# Patient Record
Sex: FEMALE | Race: WHITE | NOT HISPANIC OR LATINO | Employment: FULL TIME | URBAN - METROPOLITAN AREA
[De-identification: names, ages, dates, MRNs, and addresses within clinical notes are randomized per-mention and may not be internally consistent; named-entity substitution may affect disease eponyms.]

---

## 2017-11-16 ENCOUNTER — TELEPHONE (OUTPATIENT)
Dept: ONCOLOGY | Facility: CLINIC | Age: 32
End: 2017-11-16

## 2017-11-16 NOTE — TELEPHONE ENCOUNTER
Patient called wanting to know about getting back on oral liquid iron. She previously has been on it but the medication was discontinued and would like to get back on a different oral liquid form of iron.    I spoke with pharmacy and they confirmed several different liquid options that are OTC but usually have to be ordered and it takes a day.    I called the patient and informed  Her of the changes. Laura Zavala

## 2017-11-20 ENCOUNTER — TELEPHONE (OUTPATIENT)
Dept: ONCOLOGY | Facility: CLINIC | Age: 32
End: 2017-11-20

## 2017-12-07 ENCOUNTER — HOSPITAL ENCOUNTER (OUTPATIENT)
Facility: CLINIC | Age: 32
Setting detail: SPECIMEN
Discharge: HOME | End: 2017-12-07
Attending: INTERNAL MEDICINE | Admitting: INTERNAL MEDICINE
Payer: COMMERCIAL

## 2017-12-07 ENCOUNTER — HOSPITAL ENCOUNTER (OUTPATIENT)
Facility: CLINIC | Age: 32
Setting detail: SPECIMEN
End: 2017-12-07
Attending: INTERNAL MEDICINE
Payer: COMMERCIAL

## 2017-12-07 ENCOUNTER — ONCOLOGY VISIT (OUTPATIENT)
Dept: ONCOLOGY | Facility: CLINIC | Age: 32
End: 2017-12-07
Attending: INTERNAL MEDICINE
Payer: COMMERCIAL

## 2017-12-07 VITALS
DIASTOLIC BLOOD PRESSURE: 78 MMHG | RESPIRATION RATE: 16 BRPM | HEART RATE: 89 BPM | WEIGHT: 150 LBS | TEMPERATURE: 98.3 F | SYSTOLIC BLOOD PRESSURE: 123 MMHG | OXYGEN SATURATION: 100 % | BODY MASS INDEX: 23.15 KG/M2

## 2017-12-07 DIAGNOSIS — D64.9 ANEMIA, UNSPECIFIED TYPE: Primary | ICD-10-CM

## 2017-12-07 DIAGNOSIS — R23.3 EASY BRUISING: ICD-10-CM

## 2017-12-07 DIAGNOSIS — R53.83 OTHER FATIGUE: ICD-10-CM

## 2017-12-07 DIAGNOSIS — N92.4 EXCESSIVE BLEEDING IN PREMENOPAUSAL PERIOD: ICD-10-CM

## 2017-12-07 DIAGNOSIS — M25.552 HIP PAIN, LEFT: ICD-10-CM

## 2017-12-07 LAB
ERYTHROCYTE [DISTWIDTH] IN BLOOD BY AUTOMATED COUNT: 16.8 % (ref 10–15)
FERRITIN SERPL-MCNC: 4 NG/ML (ref 12–150)
HCT VFR BLD AUTO: 34 % (ref 35–47)
HGB BLD-MCNC: 11 G/DL (ref 11.7–15.7)
IRON SATN MFR SERPL: 7 % (ref 15–46)
IRON SERPL-MCNC: 26 UG/DL (ref 35–180)
LDH SERPL L TO P-CCNC: 130 U/L (ref 81–234)
MCH RBC QN AUTO: 25.8 PG (ref 26.5–33)
MCHC RBC AUTO-ENTMCNC: 32.4 G/DL (ref 31.5–36.5)
MCV RBC AUTO: 80 FL (ref 78–100)
PLATELET # BLD AUTO: 277 10E9/L (ref 150–450)
RBC # BLD AUTO: 4.27 10E12/L (ref 3.8–5.2)
RETICS # AUTO: 31.6 10E9/L (ref 25–95)
RETICS/RBC NFR AUTO: 0.7 % (ref 0.5–2)
TIBC SERPL-MCNC: 389 UG/DL (ref 240–430)
TSH SERPL DL<=0.005 MIU/L-ACNC: 1.4 MU/L (ref 0.4–4)
WBC # BLD AUTO: 6.3 10E9/L (ref 4–11)

## 2017-12-07 PROCEDURE — 99211 OFF/OP EST MAY X REQ PHY/QHP: CPT

## 2017-12-07 PROCEDURE — 85245 CLOT FACTOR VIII VW RISTOCTN: CPT | Performed by: INTERNAL MEDICINE

## 2017-12-07 PROCEDURE — 83615 LACTATE (LD) (LDH) ENZYME: CPT | Performed by: INTERNAL MEDICINE

## 2017-12-07 PROCEDURE — 85246 CLOT FACTOR VIII VW ANTIGEN: CPT | Performed by: INTERNAL MEDICINE

## 2017-12-07 PROCEDURE — 85247 CLOT FACTOR VIII MULTIMETRIC: CPT | Performed by: INTERNAL MEDICINE

## 2017-12-07 PROCEDURE — 82728 ASSAY OF FERRITIN: CPT | Performed by: INTERNAL MEDICINE

## 2017-12-07 PROCEDURE — 99215 OFFICE O/P EST HI 40 MIN: CPT | Performed by: INTERNAL MEDICINE

## 2017-12-07 PROCEDURE — 00000401 ZZHCL STATISTIC THROMBIN TIME NC: Performed by: INTERNAL MEDICINE

## 2017-12-07 PROCEDURE — 00000167 ZZHCL STATISTIC INR NC: Performed by: INTERNAL MEDICINE

## 2017-12-07 PROCEDURE — 84443 ASSAY THYROID STIM HORMONE: CPT | Performed by: INTERNAL MEDICINE

## 2017-12-07 PROCEDURE — 36415 COLL VENOUS BLD VENIPUNCTURE: CPT

## 2017-12-07 PROCEDURE — 85045 AUTOMATED RETICULOCYTE COUNT: CPT | Performed by: INTERNAL MEDICINE

## 2017-12-07 PROCEDURE — 85240 CLOT FACTOR VIII AHG 1 STAGE: CPT | Performed by: INTERNAL MEDICINE

## 2017-12-07 PROCEDURE — 83550 IRON BINDING TEST: CPT | Performed by: INTERNAL MEDICINE

## 2017-12-07 PROCEDURE — 00000328 ZZHCL STATISTIC PTT NC: Performed by: INTERNAL MEDICINE

## 2017-12-07 PROCEDURE — 85027 COMPLETE CBC AUTOMATED: CPT | Performed by: INTERNAL MEDICINE

## 2017-12-07 PROCEDURE — 00000447 ZZHCL STATISTIC VON WILLEBRAND MULTIMERS: Performed by: INTERNAL MEDICINE

## 2017-12-07 PROCEDURE — 83540 ASSAY OF IRON: CPT | Performed by: INTERNAL MEDICINE

## 2017-12-07 RX ORDER — OMEGA-3-ACID ETHYL ESTERS 1 G/1
2 CAPSULE, LIQUID FILLED ORAL 2 TIMES DAILY
Status: ON HOLD | COMMUNITY
End: 2022-06-01

## 2017-12-07 ASSESSMENT — PAIN SCALES - GENERAL: PAINLEVEL: SEVERE PAIN (6)

## 2017-12-07 NOTE — LETTER
"    12/7/2017         RE: Judy Argueta  [address]  [facility] [id number]        Dear Colleague,    Thank you for referring your patient, Judy Argueta, to the [facility]. Please see a copy of my visit note below.    Oncology Rooming Note    December 7, 2017 2:49 PM   Judy Argueta is a 32 year old female who presents for:    Chief Complaint   Patient presents with     Oncology Clinic Visit     Enlarged lymph nodes     Initial Vitals: /78 (BP Location: Left arm, Patient Position: Sitting, Cuff Size: Adult Regular)  Pulse 89  Temp 98.3  F (36.8  C) (Oral)  Resp 16  Wt 68 kg (150 lb)  SpO2 100%  BMI 23.15 kg/m2 Estimated body mass index is 23.15 kg/(m^2) as calculated from the following:    Height as of 8/4/16: 1.715 m (5' 7.5\").    Weight as of this encounter: 68 kg (150 lb). Body surface area is 1.8 meters squared.  Severe Pain (6) Comment: left hip pain   No LMP recorded.  Allergies reviewed: Yes  Medications reviewed: Yes    Medications: Medication refills not needed today.  Pharmacy name entered into Pick a Student:    [facility] [place], [place] - [address]  [facility] [address] [place] [address]    Clinical concerns: None                 4 minutes for nursing intake (face to face time)     Ava Palmer MA    Medical Assistant Note:  Judy Argueta presents today for lab visit.    Patient seen by provider today: Yes: Dr. Joshi.   present during visit today: Not Applicable.    Concerns: No Concerns.    Procedure:  Lab draw site: RAC, Needle type: BF, Gauge: 21 g gauze and coban applied.    Post Assessment:  Labs drawn without difficulty: Yes.    Discharge Plan:  Departure Mode: Ambulatory.    Face to Face Time: 5.    Ava Palmer MA              HEMATOLOGY/ONCOLOGY HISTORY:  Judy Argueta is a female with lymphadenopathy.     1.   Patient has a history of lymphadenopathy for many years.  At age 16, she had a lump in " the right neck which was biopsied and was benign.  At the age of 18, she had a lump in the right axilla, which was biopsied and was found to be benign.  At the age of 19, she again had a lump in the right neck which was biopsied and was benign.  None of those records are available.   2.   Patient had a CT of the neck done at [facility] in [place], [place] on 11/28/2013.  It revealed cervical lymphadenopathy which had mildly progressed from 2005 baseline.  It revealed mildly prominent cervical lymph nodes  bilaterally.  One at the right level 3 measured 1.6 x 0.6 x 4.1 cm.  The left level 3 lymph node measures 0.9 x 0.6 cm.   3.  CT of the neck, chest, abdomen and pelvis was done on 07/02/2014.  It revealed right sided level 3-4 lymph node measuring 1.5 x 0.8 x 4.2 cm.   Left sided level 2 lymph node measured up to 2 cm.  There was no lymphadenopathy or mass in the chest, abdomen and pelvis.   4.  CT of the neck was done on last 10/06/2014.   Lymph nodes had decreased slightly in size.      SUBJECTIVE:    Ms. Judy Argueta is a 32-year-old female who was previously seen in the clinic for neck lymphadenopathy.  Last CT neck on 10/06/2014 revealed decrease in size of mildly prominent lymph nodes in both sides of the neck.       Patient presents to the clinic because she of few concerns.  For last few months, she has been feeling very weak and tired.  In the last 1 year, her appetite has been decreased and she has lost 18 pounds of weight.  She also has noticed increased bruising.  It is mainly on the lateral side of both thighs.  Patient also has been having more heavy menstrual bleeding.  She has bleeding for about 7-10 days.  They are heavy but regular.      She denies any headache.  She gets lightheaded.  No ear pain or sore throat.  No neck pain.  No chest pain or shortness of breath.  No abdominal pain, nausea or vomiting.  No urinary complaints.  No diarrhea.      Patient was recently seen in [facility] because of these symptoms.  She had multiple labs done on 10/31/2017.  CBC reveals low hemoglobin of 10.7 with low MCV of 79.  Normal WBC and platelets.  Sodium, potassium, calcium, creatinine, AST, ALT, and protein are all normal.  INR, PT and PTT are all normal.      Patient also has been having pain in the left hip for last few weeks.  It feels like that it is in the joint.  Pain goes down her thigh.  No pain in the right hip.  She denies any trauma or fall.  Pain gets worse whenever she has menstrual bleeding.      Patient has been taking oral iron.  Initially she was taking liquid oral iron which she was tolerating well.  She is not able to find them.  She has tried oral iron pills.  With that she gets abdominal discomfort and constipation.  Because of these symptoms, she has stopped them and does not want to take oral iron pills.      For menorrhagia, she has seen a gynecologist.  They recommended oral contraceptive pill.  She does not want to take them.      PHYSICAL EXAMINATION:   GENERAL:  She is alert and oriented x 3.   VITAL SIGNS:  Reviewed.   EYES:  No icterus.   THROAT:  No ulcer or thrush.   NECK:  Supple. No lymphadenopathy or thyromegaly.   AXILLAE:  No lymphadenopathy.   LUNGS:  Good air entry bilaterally.  No crackles or wheezing.   HEART:  Regular.  No murmur.   ABDOMEN:  Soft and nontender.  No mass.   EXTREMITIES:  No edema.  No calf swelling or tenderness.   SKIN:  No petechiae.  No rash.  Patient had pictures of her bruising on the phone.  There is ecchymosis on the thighs.      ASSESSMENT:   1.  A 32-year-old female with microcytic anemia.   2.  Menorrhagia.   3.  Fatigue.   4.  Left hip pain with radiation to left thigh.   5.  Easy bruising.   6.  Unintentional weight loss.      PLAN:   1.  I discussed with her regarding her symptoms.  She has fatigue.  I told her fatigue could be due to multiple factors including her anemia. We will also rule out hypothyroidism.  TSH will  be done.   2.  Discussed regarding anemia.  She has microcytic anemia.  Most likely this is due to her menstrual blood loss.  For workup for anemia, we will get labs including CBC, iron, ferritin and LDH.   3.  Discussed regarding left hip pain.  I am not sure what is causing her pain.  For further evaluation, we will get an MRI of the left hip and lumbar spine.   4.  Patient has fatigue and menorrhagia.  We will rule out von Willebrand's disease. von Willebrand panel will be ordered.   5. Patient was taking oral iron for microcytic anemia.  She could not tolerate it.  I told the patient that we will wait for labs from today.  We will plan on treating her with IV iron if she still has significant iron deficiency.   6.  She had multiple questions, which were all answered.  I will see her after the above investigations.      Total face-to-face time spent 40 minutes, more than 50% of the time spent in counseling and coordination of care.         PAULA BLUNT MD             D: 2017 15:42   T: 2017 05:07   MT: bibi      Name:     JUDY ARGUETA   MRN:      [id number]        Account:      [id number]   :      1985           Visit Date:   2017      Document: [id number]        HEMATOLOGY/ONCOLOGY HISTORY:  Judy Argueta is a female with lymphadenopathy.     1.   Patient has a history of lymphadenopathy for many years.  At age 16, she had a lump in the right neck which was biopsied and was benign.  At the age of 18, she had a lump in the right axilla, which was biopsied and was found to be benign.  At the age of 19, she had a lump in the right neck which was biopsied and was benign.  None of those records are available.   2.   Patient had a CT of the neck done at [facility] in [place], [place] on 2013.  It revealed cervical lymphadenopathy which had mildly progressed from 2005 baseline.  It revealed mildly prominent cervical lymph nodes  bilaterally.  One  lymph node at right level 3 measured  1.6 x 0.6 x 4.1 cm. Left level 3 lymph node measures 0.9 x 0.6 cm.   3.  CT neck, chest, abdomen and pelvis on 07/02/2014 revealed right sided level 3-4 lymph node measuring 1.5 x 0.8 x 4.2 cm.  The left sided level 2 lymph node measured up to 2 cm.  There was no lymphadenopathy or mass in the chest, abdomen and pelvis.   4.  CT neck on 10/06/2014 revealed decrease in size of lymph nodes.      SUBJECTIVE:  Ms. Judy Argueta is a 32-year-old female who was previously seen in the clinic for neck lymphadenopathy.  Last CT neck on 10/06/2014 revealed decrease in size of mildly prominent lymph nodes in both sides of the neck.  The patient says that she has infiltrating enlarged lymph node.      The patient presents to the clinic because she has a few concerns.  For the last few months she has been very weak and tired.  In the last 1 year her appetite has been decreased and she has lost 18 pounds of weight.  She also has noticed increased bruising.  It is mainly on the lateral side of both thighs.  The patient also has been having more heavy menstrual bleeding.  She has bleeding for about 7-10 days.  They are heavy, they are regular.      She denies any headache.  She does get lightheaded.  No ear pain, sore throat.  No neck pain.  No chest pain, shortness of breath.  No abdominal pain, nausea or vomiting.  No urinary complaints.  No diarrhea.      The patient was recently seen in [facility] because of these symptoms.  She had multiple labs done on 10/31/2017.  CBC reveals low hemoglobin of 10.7 with low MCV of 79.  Normal WBC and platelets.  Sodium, potassium, calcium, creatinine, AST, ALT, and protein are all normal.  INR, PT and PTT are all normal.      The patient also has been having pain in the left hip for the last few weeks.  The patient said that it is in the hip.  It feels like it is in the joint.  The pain goes down her thigh.  No pain in the right hip.  She denies any trauma or fall.  The  pain gets worse whenever she has menstrual bleeding.      The patient has been taking oral iron.  Initially she was taking liquid oral iron which she was tolerating well.  She is not able to find them.  She has tried oral iron pills.  With that she gets abdominal discomfort.  Also, patient says that she gets constipation.  Because of these symptoms, she has stopped them and does not want to take oral iron pills.      For menorrhagia, she has seen a gynecologist.  They recommended oral contraceptive pill.  She does not want to take them.      PHYSICAL EXAMINATION:   GENERAL:  She is alert and oriented x3.   VITAL SIGNS:  Reviewed.   EYES:  No icterus.   THROAT:  No ulcer or thrush.   NECK:  Supple, no lymphadenopathy or thyromegaly.   AXILLAE:  No lymphadenopathy.   LUNGS:  Good air entry bilaterally.  No crackles or wheezing.   HEART:  Regular.  No murmur.   ABDOMEN:  Soft and nontender.  No mass.   EXTREMITIES:  No edema.  No calf swelling or tenderness.   SKIN:  No petechiae.  No rash.  The patient had pictures of her bruising on the phone.  She showed it to me.  There is ecchymosis on the thighs.      ASSESSMENT:   1.  A 32-year-old female with microcytic anemia.   2.  Menorrhagia.   3.  Fatigue.   4.  Left hip pain with radiation to left thigh.   5.  Easy bruising.   6.  Unintentional weight loss.      PLAN:   1.  I discussed with her regarding her symptoms.  She has fatigue.  I told her fatigue could be due to multiple factors including her anemia.   2.  Discussed regarding anemia.  She has microcytic anemia.  Most likely this is due to her menstrual blood loss.  For workup for anemia, we will get labs including CBC, iron, ferritin and LDH.   3.  Discussed regarding fatigue.  I tole her fatigue could be due to multiple factors including anemia.  We will rule out hypothyroidism.  TSH will be done.   4.  Discussed regarding left hip pain.  I am not sure what is causing her pain.  For further evaluation, we will  get an MRI of the left hip and lumbar spine.   5.  The patient has fatigue and menorrhagia.  We will rule out von Willebrand's disease.  A von Willebrand panel will be ordered.   6.  The patient was taking oral iron for microcytic anemia.  She could not tolerate it.  I told the patient that we will wait for labs from today.  We will plan on treating her with IV iron if she still has significant iron deficiency.   7.  She had multiple questions, which were all answered.  I will see her after the above investigations.      Total face-to-face time spent 40 minutes, more than 50% of the time spent in counseling and coordination of care.         PAULA BLUNT MD             D: 2017 15:42   T: 2017 05:07   MT: bibi      Name:     DAYTON MULLEN   MRN:      [id number]        Account:      [id number]   :      1985           Visit Date:   2017      Document: [id number]          Again, thank you for allowing me to participate in the care of your patient.        Sincerely,        Paula Blunt MD

## 2017-12-07 NOTE — MR AVS SNAPSHOT
After Visit Summary   12/7/2017    Judy Argueta    MRN: [id number]           Patient Information     Date Of Birth          1985        Visit Information        Provider Department      12/7/2017 3:00 PM Layo Joshi MD [facility]        Today's Diagnoses     Anemia, unspecified type    -  1    Other fatigue        Easy bruising        Excessive bleeding in premenopausal period        Hip pain, left          Care Instructions    Lab.- Done LW  MRI left hip and lumbar spine.  Pelvic ultrasound.  Follow up after above.          Follow-ups after your visit        Your next 10 appointments already scheduled     Dec 13, 2017  8:30 AM CST   US PELVIC COMPLETE W TRANSVAGINAL with SHUS5   [facility] ([facility])    [address] [address] [phone]           Please bring a list of your medicines (including vitamins, minerals and over-the-counter drugs). Also, tell your doctor about any allergies you may have. Wear comfortable clothes and leave your valuables at home.  Adults: Drink six 8-ounce glasses of fluid one hour before your exam. Do NOT empty your bladder.  If you need to empty your bladder before your exam, try to release only a little bit of urine. Then, drink another 8oz glass of fluid.  Children: Children who are potty trained should drink at least 4 cups (32 oz) of liquid 45 minutes to one hour prior to the exam. The child s bladder must be full in order to achieve a diagnostic exam. If your child is very uncomfortable or has an urgent need to pee, please notify a technologist; they will try to find out how much longer the wait may be and provide instructions to help relieve the pressure. Occasionally it is medically necessary to insert a urinary catheter to fill the bladder.  Please call the Imaging Department at your exam site with any questions.            Dec 13, 2017 10:00 AM CST   (Arrive by 9:45 AM)     LUMBAR SPINE W/O & W CONTRAST with SHMRP1   [facility] ([facility])    [address] [address] [phone]           Take your medicines as usual, unless your doctor tells you not to. Bring a list of your current medicines to your exam (including vitamins, minerals and over-the-counter drugs).  You will be given intravenous contrast for this exam. To prepare:   The day before your exam, drink extra fluids at least six 8-ounce glasses (unless your doctor tells you to restrict your fluids).   Have a blood test (creatinine test) within 30 days of your exam. Go to your clinic or Diagnostic Imaging Department for this test.  The MRI machine uses a strong magnet. Please wear clothes without metal (snaps, zippers). A sweatsuit works well, or we may give you a hospital gown.  Please remove any body piercings and hair extensions before you arrive. You will also remove watches, jewelry, hairpins, wallets, dentures, partial dental plates and hearing aids. You may wear contact lenses, and you may be able to wear your rings. We have a safe place to keep your personal items, but it is safer to leave them at home.   **IMPORTANT** THE INSTRUCTIONS BELOW ARE ONLY FOR THOSE PATIENTS WHO HAVE BEEN TOLD THEY WILL RECEIVE SEDATION OR GENERAL ANESTHESIA DURING THEIR MRI PROCEDURE:  IF YOU WILL RECEIVE SEDATION (take medicine to help you relax during your exam):   You must get the medicine from your doctor before you arrive. Bring the medicine to the exam. Do not take it at home.   Arrive one hour early. Bring someone who can take you home after the test. Your medicine will make you sleepy. After the exam, you may not drive, take a bus or take a taxi by yourself.   No eating 8 hours before your exam. You may have clear liquids up until 4 hours before your exam. (Clear liquids include water, clear tea, black coffee and fruit juice without pulp.)  IF YOU WILL RECEIVE ANESTHESIA (be  asleep for your exam):   Arrive 1 1/2 hours early. Bring someone who can take you home after the test. You may not drive, take a bus or take a taxi by yourself.   No eating 8 hours before your exam. You may have clear liquids up until 4 hours before your exam. (Clear liquids include water, clear tea, black coffee and fruit juice without pulp.)  Please call the Imaging Department at your exam site with any questions.            Dec 13, 2017 10:45 AM CST   (Arrive by 10:30 AM)   MR HIP LEFT W CONTRAST with SHMRP1   [facility] ([facility])    [address] [place] [address] [phone]           Take your medicines as usual, unless your doctor tells you not to. Bring a list of your current medicines to your exam (including vitamins, minerals and over-the-counter drugs).  You will be given intravenous contrast for this exam. To prepare:   The day before your exam, drink extra fluids at least six 8-ounce glasses (unless your doctor tells you to restrict your fluids).   Have a blood test (creatinine test) within 30 days of your exam. Go to your clinic or Diagnostic Imaging Department for this test.  The MRI machine uses a strong magnet. Please wear clothes without metal (snaps, zippers). A sweatsuit works well, or we may give you a hospital gown.  Please remove any body piercings and hair extensions before you arrive. You will also remove watches, jewelry, hairpins, wallets, dentures, partial dental plates and hearing aids. You may wear contact lenses, and you may be able to wear your rings. We have a safe place to keep your personal items, but it is safer to leave them at home.   **IMPORTANT** THE INSTRUCTIONS BELOW ARE ONLY FOR THOSE PATIENTS WHO HAVE BEEN TOLD THEY WILL RECEIVE SEDATION OR GENERAL ANESTHESIA DURING THEIR MRI PROCEDURE:  IF YOU WILL RECEIVE SEDATION (take medicine to help you relax during your exam):   You must get the medicine from your doctor before you  arrive. Bring the medicine to the exam. Do not take it at home.   Arrive one hour early. Bring someone who can take you home after the test. Your medicine will make you sleepy. After the exam, you may not drive, take a bus or take a taxi by yourself.   No eating 8 hours before your exam. You may have clear liquids up until 4 hours before your exam. (Clear liquids include water, clear tea, black coffee and fruit juice without pulp.)  IF YOU WILL RECEIVE ANESTHESIA (be asleep for your exam):   Arrive 1 1/2 hours early. Bring someone who can take you home after the test. You may not drive, take a bus or take a taxi by yourself.   No eating 8 hours before your exam. You may have clear liquids up until 4 hours before your exam. (Clear liquids include water, clear tea, black coffee and fruit juice without pulp.)  Please call the Imaging Department at your exam site with any questions.            Dec 19, 2017  3:30 PM CST   Return Visit with Layo Joshi MD   [facility] ([facility])    [facility]  [address]  [facility] [address]   [phone]              Future tests that were ordered for you today     Open Future Orders        Priority Expected Expires Ordered    MR Lumbar Spine w/o & w Contrast Routine  12/7/2018 12/7/2017    MR Hip Left w Contrast Routine  12/7/2018 12/7/2017    US Pelvic Complete w Transvaginal Routine  12/7/2018 12/7/2017            Who to contact     If you have questions or need follow up information about today's clinic visit or your schedule please contact [facility] directly at [phone].  Normal or non-critical lab and imaging results will be communicated to you by MyChart, letter or phone within 4 business days after the clinic has received the results. If you do not hear from us within 7 days, please contact the clinic through MyChart or phone. If you have a critical or abnormal lab result, we will notify you  by phone as soon as possible.  Submit refill requests through [employer] or call your pharmacy and they will forward the refill request to us. Please allow 3 business days for your refill to be completed.          Additional Information About Your Visit        [employer] Information     [employer] gives you secure access to your electronic health record. If you see a primary care provider, you can also send messages to your care team and make appointments. If you have questions, please call your primary care clinic.  If you do not have a primary care provider, please call [phone] and they will assist you.        Care EveryWhere ID     This is your Care EveryWhere ID. This could be used by other organizations to access your [facility] medical records  [id number]        Your Vitals Were     Pulse Temperature Respirations Pulse Oximetry BMI (Body Mass Index)       89 98.3  F (36.8  C) (Oral) 16 100% 23.15 kg/m2        Blood Pressure from Last 3 Encounters:   12/07/17 123/78   08/04/16 122/85   07/01/16 120/80    Weight from Last 3 Encounters:   12/07/17 68 kg (150 lb)   08/04/16 65.5 kg (144 lb 4.8 oz)   07/01/16 65.8 kg (145 lb)              We Performed the Following     CBC with platelets     Factor 8 assay     Ferritin     Iron and iron binding capacity     Lactate Dehydrogenase     Reticulocyte count     TSH with free T4 reflex     Von Willebrand antigen     von Willebrand Interpretation     Von Willebrand Multimers     VWF Activity with reflex to Ristocetin Cofactor Activity          Today's Medication Changes          These changes are accurate as of: 12/7/17  3:43 PM.  If you have any questions, ask your nurse or doctor.               Stop taking these medicines if you haven't already. Please contact your care team if you have questions.     calcium-magnesium 500-250 MG Tabs per tablet   Commonly known as:  CALMAG           Ferrous Sulfate 5 MG/20ML Liqd   Commonly known as:  SPATONE PUR-ABSORB IRON                     Primary Care Provider Office Phone # Fax #    Willam Arevalo [phone]       XXX RESIGNED XXX  [facility] [address]        Equal Access to Services     TIM MARTINES : Tra gurdeep lopez aleksandra Sheldon, wabraydenda luqadaha, qanubiata kaalmada sherwin, nabila ribeiro laFantacrispin nicole. So [facility] [phone].    ATENCIÓN: Si habla español, tiene a casarez disposición servicios gratuitos de asistencia lingüística. Llame al [phone].    We comply with applicable federal civil rights laws and [place] laws. We do not discriminate on the basis of race, color, national origin, age, disability, sex, sexual orientation, or gender identity.            Thank you!     Thank you for choosing [facility]  for your care. Our goal is always to provide you with excellent care. Hearing back from our patients is one way we can continue to improve our services. Please take a few minutes to complete the written survey that you may receive in the mail after your visit with us. Thank you!             Your Updated Medication List - Protect others around you: Learn how to safely use, store and throw away your medicines at www.disposemymeds.org.          This list is accurate as of: 12/7/17  3:43 PM.  Always use your most recent med list.                   Brand Name Dispense Instructions for use Diagnosis    cholecalciferol 55767 UNITS capsule    VITAMIN D3    40 capsule    Take 1 capsule (50,000 Units) by mouth every 14 days SIG: TAKE ONE CAP EVERY OTHER WEEK, INDICATION: TO TREAT VITAMIN D DEFICIENCY (1ST AND 15TH OF EACH MONTH)    Vitamin D deficiency       Cyanocobalamin 5000 MCG/ML Liqd    B-12 SUPER STRENGTH    2 Bottle    Place 1 mL under the tongue daily FOR VITAMIN B12 SUPPLEMENTATION, PLEASE PLACE UNDER THE TONGUE    Fatigue, unspecified type       omega-3 acid ethyl esters 1 G capsule    Lovaza     Take 2 g by mouth 2 times daily        ZOLOFT PO      Take 100 mg by mouth daily

## 2017-12-07 NOTE — PROGRESS NOTES
"Oncology Rooming Note    December 7, 2017 2:49 PM   Judy Argueta is a 32 year old female who presents for:    Chief Complaint   Patient presents with     Oncology Clinic Visit     Enlarged lymph nodes     Initial Vitals: /78 (BP Location: Left arm, Patient Position: Sitting, Cuff Size: Adult Regular)  Pulse 89  Temp 98.3  F (36.8  C) (Oral)  Resp 16  Wt 68 kg (150 lb)  SpO2 100%  BMI 23.15 kg/m2 Estimated body mass index is 23.15 kg/(m^2) as calculated from the following:    Height as of 8/4/16: 1.715 m (5' 7.5\").    Weight as of this encounter: 68 kg (150 lb). Body surface area is 1.8 meters squared.  Severe Pain (6) Comment: left hip pain   No LMP recorded.  Allergies reviewed: Yes  Medications reviewed: Yes    Medications: Medication refills not needed today.  Pharmacy name entered into [employer]:    [facility] [address] [facility] [address] [place] [address]    Clinical concerns: None                 4 minutes for nursing intake (face to face time)     Ava Palmer MA    Medical Assistant Note:  Judy Argueta presents today for lab visit.    Patient seen by provider today: Yes: Dr. Joshi.   present during visit today: Not Applicable.    Concerns: No Concerns.    Procedure:  Lab draw site: RAC, Needle type: BF, Gauge: 21 g gauze and coban applied.    Post Assessment:  Labs drawn without difficulty: Yes.    Discharge Plan:  Departure Mode: Ambulatory.    Face to Face Time: 5.    Ava Palmer MA            "

## 2017-12-07 NOTE — PATIENT INSTRUCTIONS
Lab.- Done LW  MRI left hip and lumbar spine.  Scheduled/Isa  Pelvic ultrasound.  Scheduled/isa  Follow up after above.  Scheduled/isa    AVS printed & given to patient/isa

## 2017-12-08 LAB
FACT VIII ACT/NOR PPP: 62 % (ref 55–200)
VWF CBA/VWF AG PPP IA-RTO: 52 % (ref 50–200)
VWF:AC ACT/NOR PPP IA: 49 % (ref 50–180)

## 2017-12-08 NOTE — PROGRESS NOTES
HEMATOLOGY/ONCOLOGY HISTORY:  Judy Argueta is a female with lymphadenopathy.     1.  Patient has a history of lymphadenopathy for many years.  At age 16, she had a lump in the right neck which was biopsied and was benign.  At the age of 18, she had a lump in the right axilla, which was biopsied and was found to be benign.  At the age of 19, she again had a lump in the right neck which was biopsied and was benign.  None of those records are available.   2.  Patient had a CT of the neck done at [facility] in [place], [place] on 11/28/2013.  It revealed cervical lymphadenopathy which had mildly progressed from 2005 baseline.  It revealed mildly prominent cervical lymph nodes  bilaterally.  One at the right level 3 measured 1.6 x 0.6 x 4.1 cm.  The left level 3 lymph node measures 0.9 x 0.6 cm.   3.  CT of the neck, chest, abdomen and pelvis was done on 07/02/2014.  It revealed right sided level 3-4 lymph node measuring 1.5 x 0.8 x 4.2 cm.  Left sided level 2 lymph node measured up to 2 cm.  There was no lymphadenopathy or mass in the chest, abdomen and pelvis.   4.  CT of the neck was done on last 10/06/2014.  Lymph nodes had decreased slightly in size.      SUBJECTIVE:    Ms. Judy Argueta is a 32-year-old female who was previously seen in the clinic for neck lymphadenopathy.  Last CT neck on 10/06/2014 revealed decrease in size of mildly prominent lymph nodes in both sides of the neck.       Patient presents to the clinic because she of few concerns.  For last few months, she has been feeling very weak and tired.  In the last 1 year, her appetite has been decreased and she has lost 18 pounds of weight.  She also has noticed increased bruising.  It is mainly on the lateral side of both thighs.  Patient also has been having more heavy menstrual bleeding.  She has bleeding for about 7-10 days.  They are heavy but regular.      She denies any headache.  She gets lightheaded.  No ear pain or sore throat.  No neck pain.  No  chest pain or shortness of breath.  No abdominal pain, nausea or vomiting.  No urinary complaints.  No diarrhea.      Patient was recently seen in [facility] because of these symptoms.  She had multiple labs done on 10/31/2017.  CBC reveals low hemoglobin of 10.7 with low MCV of 79.  Normal WBC and platelets.  Sodium, potassium, calcium, creatinine, AST, ALT, and protein are all normal.  INR, PT and PTT are all normal.      Patient also has been having pain in the left hip for last few weeks.  It feels like that it is in the joint.  Pain goes down her thigh.  No pain in the right hip.  She denies any trauma or fall.  Pain gets worse whenever she has menstrual bleeding.      Patient has been taking oral iron.  Initially she was taking liquid oral iron which she was tolerating well.  She is not able to find them.  She has tried oral iron pills.  With that she gets abdominal discomfort and constipation.  Because of these symptoms, she has stopped them and does not want to take oral iron pills.      For menorrhagia, she has seen a gynecologist.  They recommended oral contraceptive pill.  She does not want to take them.      PHYSICAL EXAMINATION:   GENERAL:  She is alert and oriented x 3.   VITAL SIGNS:  Reviewed.   EYES:  No icterus.   THROAT:  No ulcer or thrush.   NECK:  Supple. No lymphadenopathy or thyromegaly.   AXILLAE:  No lymphadenopathy.   LUNGS:  Good air entry bilaterally.  No crackles or wheezing.   HEART:  Regular.  No murmur.   ABDOMEN:  Soft and nontender.  No mass.   EXTREMITIES:  No edema.  No calf swelling or tenderness.   SKIN:  No petechiae.  No rash.  Patient had pictures of her bruising on the phone.  There is ecchymosis on the thighs.      ASSESSMENT:   1.  A 32-year-old female with microcytic anemia.   2.  Menorrhagia.   3.  Fatigue.   4.  Left hip pain with radiation to left thigh.   5.  Easy bruising.   6.  Unintentional weight loss.      PLAN:   1.  I discussed with her  regarding her symptoms.  She has fatigue.  I told her fatigue could be due to multiple factors including her anemia. We will also rule out hypothyroidism.  TSH will be done.   2.  Discussed regarding anemia.  She has microcytic anemia.  Most likely this is due to her menstrual blood loss.  For workup for anemia, we will get labs including CBC, iron, ferritin and LDH.   3.  Discussed regarding left hip pain.  I am not sure what is causing her pain.  For further evaluation, we will get an MRI of the left hip and lumbar spine.   4.  Patient has fatigue and menorrhagia.  We will rule out von Willebrand's disease. von Willebrand panel will be ordered.   5. Patient was taking oral iron for microcytic anemia.  She could not tolerate it.  I told the patient that we will wait for labs from today.  We will plan on treating her with IV iron if she still has significant iron deficiency.   6.  She had multiple questions, which were all answered.  I will see her after the above investigations.      Total face-to-face time spent 40 minutes, more than 50% of the time spent in counseling and coordination of care.         PAULA BLUNT MD             D: 2017 15:42   T: 2017 05:07   MT: bibi      Name:     DAYTON MULLEN   MRN:      [id number]        Account:      [id number]   :      1985           Visit Date:   2017      Document: [id number]

## 2017-12-10 NOTE — PROGRESS NOTES
HEMATOLOGY/ONCOLOGY HISTORY:  Judy Argueta is a female with lymphadenopathy.     1.  Patient has a history of lymphadenopathy for many years.  At age 16, she had a lump in the right neck which was biopsied and was benign.  At the age of 18, she had a lump in the right axilla, which was biopsied and was found to be benign.  At the age of 19, she had a lump in the right neck which was biopsied and was benign.  None of those records are available.   2.  Patient had a CT of the neck done at [facility] in [place], [place] on 11/28/2013.  It revealed cervical lymphadenopathy which had mildly progressed from 2005 baseline.  It revealed mildly prominent cervical lymph nodes  bilaterally.  One lymph node at right level 3 measured 1.6 x 0.6 x 4.1 cm. Left level 3 lymph node measures 0.9 x 0.6 cm.   3.  CT neck, chest, abdomen and pelvis on 07/02/2014 revealed right sided level 3-4 lymph node measuring 1.5 x 0.8 x 4.2 cm.  The left sided level 2 lymph node measured up to 2 cm.  There was no lymphadenopathy or mass in the chest, abdomen and pelvis.   4.  CT neck on 10/06/2014 revealed decrease in size of lymph nodes.      SUBJECTIVE:  Ms. Judy Argueta is a 32-year-old female who was previously seen in the clinic for neck lymphadenopathy.  Last CT neck on 10/06/2014 revealed decrease in size of mildly prominent lymph nodes in both sides of the neck.  The patient says that she has infiltrating enlarged lymph node.      The patient presents to the clinic because she has a few concerns.  For the last few months she has been very weak and tired.  In the last 1 year her appetite has been decreased and she has lost 18 pounds of weight.  She also has noticed increased bruising.  It is mainly on the lateral side of both thighs.  The patient also has been having more heavy menstrual bleeding.  She has bleeding for about 7-10 days.  They are heavy, they are regular.      She denies any headache.  She does get lightheaded.  No ear pain,  sore throat.  No neck pain.  No chest pain, shortness of breath.  No abdominal pain, nausea or vomiting.  No urinary complaints.  No diarrhea.      The patient was recently seen in [facility] because of these symptoms.  She had multiple labs done on 10/31/2017.  CBC reveals low hemoglobin of 10.7 with low MCV of 79.  Normal WBC and platelets.  Sodium, potassium, calcium, creatinine, AST, ALT, and protein are all normal.  INR, PT and PTT are all normal.      The patient also has been having pain in the left hip for the last few weeks.  The patient said that it is in the hip.  It feels like it is in the joint.  The pain goes down her thigh.  No pain in the right hip.  She denies any trauma or fall.  The pain gets worse whenever she has menstrual bleeding.      The patient has been taking oral iron.  Initially she was taking liquid oral iron which she was tolerating well.  She is not able to find them.  She has tried oral iron pills.  With that she gets abdominal discomfort.  Also, patient says that she gets constipation.  Because of these symptoms, she has stopped them and does not want to take oral iron pills.      For menorrhagia, she has seen a gynecologist.  They recommended oral contraceptive pill.  She does not want to take them.      PHYSICAL EXAMINATION:   GENERAL:  She is alert and oriented x3.   VITAL SIGNS:  Reviewed.   EYES:  No icterus.   THROAT:  No ulcer or thrush.   NECK:  Supple, no lymphadenopathy or thyromegaly.   AXILLAE:  No lymphadenopathy.   LUNGS:  Good air entry bilaterally.  No crackles or wheezing.   HEART:  Regular.  No murmur.   ABDOMEN:  Soft and nontender.  No mass.   EXTREMITIES:  No edema.  No calf swelling or tenderness.   SKIN:  No petechiae.  No rash.  The patient had pictures of her bruising on the phone.  She showed it to me.  There is ecchymosis on the thighs.      ASSESSMENT:   1.  A 32-year-old female with microcytic anemia.   2.  Menorrhagia.   3.  Fatigue.    4.  Left hip pain with radiation to left thigh.   5.  Easy bruising.   6.  Unintentional weight loss.      PLAN:   1.  I discussed with her regarding her symptoms.  She has fatigue.  I told her fatigue could be due to multiple factors including her anemia.   2.  Discussed regarding anemia.  She has microcytic anemia.  Most likely this is due to her menstrual blood loss.  For workup for anemia, we will get labs including CBC, iron, ferritin and LDH.   3.  Discussed regarding fatigue.  I tole her fatigue could be due to multiple factors including anemia.  We will rule out hypothyroidism.  TSH will be done.   4.  Discussed regarding left hip pain.  I am not sure what is causing her pain.  For further evaluation, we will get an MRI of the left hip and lumbar spine.   5.  The patient has fatigue and menorrhagia.  We will rule out von Willebrand's disease.  A von Willebrand panel will be ordered.   6.  The patient was taking oral iron for microcytic anemia.  She could not tolerate it.  I told the patient that we will wait for labs from today.  We will plan on treating her with IV iron if she still has significant iron deficiency.   7.  She had multiple questions, which were all answered.  I will see her after the above investigations.      Total face-to-face time spent 40 minutes, more than 50% of the time spent in counseling and coordination of care.         PAULA BLUNT MD             D: 2017 15:42   T: 2017 05:07   MT: bibi      Name:     DAYTON MULLEN   MRN:      [id number]        Account:      [id number]   :      1985           Visit Date:   2017      Document: [id number]

## 2017-12-11 LAB — VWF MULTIMERS PPP QL: NORMAL

## 2017-12-13 ENCOUNTER — HOSPITAL ENCOUNTER (OUTPATIENT)
Dept: MRI IMAGING | Facility: CLINIC | Age: 32
End: 2017-12-13
Attending: INTERNAL MEDICINE
Payer: COMMERCIAL

## 2017-12-13 ENCOUNTER — HOSPITAL ENCOUNTER (OUTPATIENT)
Dept: ULTRASOUND IMAGING | Facility: CLINIC | Age: 32
Discharge: HOME | End: 2017-12-13
Attending: INTERNAL MEDICINE | Admitting: INTERNAL MEDICINE
Payer: COMMERCIAL

## 2017-12-13 DIAGNOSIS — N92.4 EXCESSIVE BLEEDING IN PREMENOPAUSAL PERIOD: ICD-10-CM

## 2017-12-13 DIAGNOSIS — M25.552 HIP PAIN, LEFT: ICD-10-CM

## 2017-12-13 LAB — VWF:RCO ACT/NOR PPP PL AGG: 62 % (ref 51–215)

## 2017-12-13 PROCEDURE — 72148 MRI LUMBAR SPINE W/O DYE: CPT

## 2017-12-13 PROCEDURE — 73721 MRI JNT OF LWR EXTRE W/O DYE: CPT | Mod: LT

## 2017-12-13 PROCEDURE — 76830 TRANSVAGINAL US NON-OB: CPT

## 2017-12-14 NOTE — PROGRESS NOTES
Debbie Mrs. Harp,    MRI of left hip does not reveal any bone fracture.  There is mild tendinitis.  That could be causing the pain.  Please follow-up in the clinic as scheduled.    Layo Joshi

## 2017-12-15 LAB — VWF MULTIMERS PPP QL: NORMAL

## 2017-12-17 ENCOUNTER — HEALTH MAINTENANCE LETTER (OUTPATIENT)
Age: 32
End: 2017-12-17

## 2017-12-18 LAB — VON WILLEBRAND INTERPRETATION: NORMAL

## 2017-12-19 ENCOUNTER — ONCOLOGY VISIT (OUTPATIENT)
Dept: ONCOLOGY | Facility: CLINIC | Age: 32
End: 2017-12-19
Attending: INTERNAL MEDICINE
Payer: COMMERCIAL

## 2017-12-19 VITALS
HEART RATE: 70 BPM | OXYGEN SATURATION: 99 % | BODY MASS INDEX: 23.83 KG/M2 | RESPIRATION RATE: 18 BRPM | SYSTOLIC BLOOD PRESSURE: 114 MMHG | TEMPERATURE: 98.6 F | WEIGHT: 154.4 LBS | DIASTOLIC BLOOD PRESSURE: 71 MMHG

## 2017-12-19 DIAGNOSIS — D50.9 IRON DEFICIENCY ANEMIA, UNSPECIFIED IRON DEFICIENCY ANEMIA TYPE: Primary | ICD-10-CM

## 2017-12-19 DIAGNOSIS — K90.9 MALABSORPTION OF IRON: ICD-10-CM

## 2017-12-19 DIAGNOSIS — R53.83 FATIGUE, UNSPECIFIED TYPE: ICD-10-CM

## 2017-12-19 PROCEDURE — 99213 OFFICE O/P EST LOW 20 MIN: CPT | Performed by: INTERNAL MEDICINE

## 2017-12-19 PROCEDURE — 99211 OFF/OP EST MAY X REQ PHY/QHP: CPT

## 2017-12-19 ASSESSMENT — PAIN SCALES - GENERAL: PAINLEVEL: SEVERE PAIN (6)

## 2017-12-19 NOTE — PROGRESS NOTES
"Oncology Rooming Note    December 19, 2017 3:26 PM   Judy Argueta is a 32 year old female who presents for:    Chief Complaint   Patient presents with     Oncology Clinic Visit     High risk HPV infection     Initial Vitals: /71 (BP Location: Left arm, Patient Position: Sitting, Cuff Size: Adult Regular)  Pulse 70  Temp 98.6  F (37  C) (Oral)  Resp 18  Wt 70 kg (154 lb 6.4 oz)  SpO2 99%  BMI 23.83 kg/m2 Estimated body mass index is 23.83 kg/(m^2) as calculated from the following:    Height as of 8/4/16: 1.715 m (5' 7.5\").    Weight as of this encounter: 70 kg (154 lb 6.4 oz). Body surface area is 1.83 meters squared.  Severe Pain (6) Comment: Data Unavailable   No LMP recorded.  Allergies reviewed: Yes  Medications reviewed: Yes    Medications: Medication refills not needed today.  Pharmacy name entered into [facility]:    [facility] [place], [place] - [address] [facility] [address] [place] [address]    Clinical concerns: no     5 minutes for nursing intake (face to face time)         Adilia Dougherty MA              "

## 2017-12-19 NOTE — PATIENT INSTRUCTIONS
IV injectafer x 2 doses. Scheduled/Aliza  See gynecologist. Patient to make an appointment  Follow up 3-4 weeks after IV injectafer with labs. Scheduled/Aliza    AVS printed and given to patient/aliza

## 2017-12-19 NOTE — MR AVS SNAPSHOT
After Visit Summary   12/19/2017    Judy Argueta    MRN: [id number]           Patient Information     Date Of Birth          1985        Visit Information        Provider Department      12/19/2017 3:30 PM Layo Joshi MD [facility]        Today's Diagnoses     Iron deficiency anemia, unspecified iron deficiency anemia type    -  1    Fatigue, unspecified type        Malabsorption of iron          Care Instructions    IV injectafer x 2 doses.  See gynecologist.  Follow up 3-4 weeks after IV injectafer with labs.          Follow-ups after your visit        Your next 10 appointments already scheduled     Dec 27, 2017  2:00 PM CST   Level 1 with  INFUSION CHAIR 13   [facility] ([facility])    [facility]  [address]  [place] [address]   [phone]            Jan 03, 2018  2:00 PM CST   Level 1 with  INFUSION CHAIR 18   [facility] ([facility])    [facility]  [address]  [place] [address]   [phone]            Feb 05, 2018  3:30 PM CST   Return Visit with  Oncology Nurse   [facility] ([facility])    [facility]  [address]  [place] [address]   [phone]            Feb 07, 2018  3:30 PM CST   Return Visit with Layo Joshi MD   [facility] ([facility])    [facility]  [address]  [place] [address]   [phone]              Future tests that were ordered for you today     Open Future Orders        Priority Expected Expires Ordered    Iron and iron binding capacity Routine 2/5/2018 4/19/2018 12/19/2017    Ferritin Routine 2/5/2018 4/19/2018 12/19/2017    Reticulocyte count Routine 2/5/2018 4/19/2018 12/19/2017    CBC with platelets differential Routine 2/5/2018  4/19/2018 12/19/2017            Who to contact     If you have questions or need follow up information about today's clinic visit or your schedule please contact [facility] directly at [phone].  Normal or non-critical lab and imaging results will be communicated to you by MyChart, letter or phone within 4 business days after the clinic has received the results. If you do not hear from us within 7 days, please contact the clinic through MyChart or phone. If you have a critical or abnormal lab result, we will notify you by phone as soon as possible.  Submit refill requests through [employer] [facility] or call your pharmacy and they will forward the refill request to us. Please allow 3 business days for your refill to be completed.          Additional Information About Your Visit        [employer] Information     [employer] [facility] [employer] [facility] [employer] gives you secure access to your electronic health record. If you see a primary care provider, you can also send messages to your care team and make appointments. If you have questions, please call your primary care clinic.  If you do not have a primary care provider, please call [phone] and they will assist you.        Care EveryWhere ID     This is your Care EveryWhere ID. This could be used by other organizations to access your [facility] medical records  [id number]        Your Vitals Were     Pulse Temperature Respirations Pulse Oximetry BMI (Body Mass Index)       70 98.6  F (37  C) (Oral) 18 99% 23.83 kg/m2        Blood Pressure from Last 3 Encounters:   12/19/17 114/71   12/07/17 123/78   08/04/16 122/85    Weight from Last 3 Encounters:   12/19/17 70 kg (154 lb 6.4 oz)   12/07/17 68 kg (150 lb)   08/04/16 65.5 kg (144 lb 4.8 oz)               Primary Care Provider Office Phone # Fax #    Layo Joshi [phone]       [facility] [address] [address]        Equal Access to Services     TIM MARTINES AH: Tra Sheldon,  ernstluke avalosmaggi, bruno kagrant courtney, nabila marieaachai ah. So [facility] [phone].    ATENCIÓN: Si catalino alonso, tiene a casarez disposición servicios gratuitos de asistencia lingüística. Pam al [phone].    We comply with applicable federal civil rights laws and [place] laws. We do not discriminate on the basis of race, color, national origin, age, disability, sex, sexual orientation, or gender identity.            Thank you!     Thank you for choosing [facility]  for your care. Our goal is always to provide you with excellent care. Hearing back from our patients is one way we can continue to improve our services. Please take a few minutes to complete the written survey that you may receive in the mail after your visit with us. Thank you!             Your Updated Medication List - Protect others around you: Learn how to safely use, store and throw away your medicines at www.disposemymeds.org.          This list is accurate as of: 12/19/17  4:15 PM.  Always use your most recent med list.                   Brand Name Dispense Instructions for use Diagnosis    cholecalciferol 71749 UNITS capsule    VITAMIN D3    40 capsule    Take 1 capsule (50,000 Units) by mouth every 14 days SIG: TAKE ONE CAP EVERY OTHER WEEK, INDICATION: TO TREAT VITAMIN D DEFICIENCY (1ST AND 15TH OF EACH MONTH)    Vitamin D deficiency       Cyanocobalamin 5000 MCG/ML Liqd    B-12 SUPER STRENGTH    2 Bottle    Place 1 mL under the tongue daily FOR VITAMIN B12 SUPPLEMENTATION, PLEASE PLACE UNDER THE TONGUE    Fatigue, unspecified type       omega-3 acid ethyl esters 1 G capsule    Lovaza     Take 2 g by mouth 2 times daily        ZOLOFT PO      Take 150 mg by mouth daily

## 2017-12-19 NOTE — LETTER
"    12/19/2017         RE: Judy Argueta  [address]  [place] [address]        Dear Colleague,    Thank you for referring your patient, Judy Argueta, to the [facility]. Please see a copy of my visit note below.    Oncology Rooming Note    December 19, 2017 3:26 PM   Judy Argueta is a 32 year old female who presents for:    Chief Complaint   Patient presents with     Oncology Clinic Visit     High risk HPV infection     Initial Vitals: /71 (BP Location: Left arm, Patient Position: Sitting, Cuff Size: Adult Regular)  Pulse 70  Temp 98.6  F (37  C) (Oral)  Resp 18  Wt 70 kg (154 lb 6.4 oz)  SpO2 99%  BMI 23.83 kg/m2 Estimated body mass index is 23.83 kg/(m^2) as calculated from the following:    Height as of 8/4/16: 1.715 m (5' 7.5\").    Weight as of this encounter: 70 kg (154 lb 6.4 oz). Body surface area is 1.83 meters squared.  Severe Pain (6) Comment: Data Unavailable   No LMP recorded.  Allergies reviewed: Yes  Medications reviewed: Yes    Medications: Medication refills not needed today.  Pharmacy name entered into [facility]:    [facility] [address]  [facility] [address] [place] [address]    Clinical concerns: no     5 minutes for nursing intake (face to face time)         Adilia Dougherty MA                SUBJECTIVE:  Ms. Judy Argueta is a 32-year-old female who was recently evaluated for multiple problems including microcytic anemia, fatigue, easy bruising and left hip pain.  Multiple investigations were ordered.  She is here for followup on the result.      Multiple labs were done on 12/07/2017:   -- Hemoglobin of 11 with an MCV of 80.  Retic of 0.7%.  Normal WBC and platelets.   -- Iron of 26 with saturation of 7% and ferritin of 4.   -- LDH of 130.   -- TSH of 1.40.   -- Normal factor VIII, von Willebrand antigen, Ristocetin Cofactor and multimer analysis.  The von Willebrand factor activity slightly " low at 49.      Pelvic ultrasound was done for menorrhagia.  It reveals 3 indeterminate right ovarian lesions measuring up to 2.4 cm.  The endometrial stripe is mildly heterogeneous.  There could be an underlying polyp.      For pain, she had MRI of the lumbar spine and hip done.  Lumbar spine MRI reveals a small central annular fissure with disk bulge at L4-L5 without stenosis.  MRI of the left hip reveals very mild edema surrounding the insertion of the left gluteus minimus tendon on anterior greater trochanter, could be due to mild tendinitis.  No fracture.  No avascular necrosis.      The patient continues to feel very weak.  She is currently having her period.  The flow is heavy.  She continues to have some easy bruising.      PHYSICAL EXAMINATION:   GENERAL:  She is alert and oriented x3.   VITAL SIGNS:  Reviewed.   Rest of the systems not examined.      ASSESSMENT:   1.  Iron deficiency anemia.   2.  Iron deficiency secondary to menorrhagia.   3.  Menorrhagia.   4.  Fatigue secondary to iron deficiency anemia.   5.  Left hip pain secondary to tendinitis.   6.  Easy bruising.   7.  Unintentional weight loss.   8.  Right ovarian indeterminate lesions and abnormal endometrial stripe.      PLAN:   1.  The labs and scans were all reviewed with the patient.      Discussed regarding her anemia and iron deficiency.  Iron deficiency anemia is secondary to her menorrhagia.      The patient has been on oral iron.  She has not been able to tolerate it.  Also, oral iron has not helped.  She continues to iron deficiency anemia.      Discussed with her regarding intravenous iron as she has mild absorption of iron.  Side effects including anaphylactic reactions were discussed.  She is agreeable for intravenous iron.  We will give her 2 doses of intravenous Injectafer.  This would help with iron deficiency anemia and fatigue.      We will recheck her labs including CBC and iron studies in 3-4 weeks after the intravenous iron.       2.  Discussed regarding left hip pain.  It is secondary to tendinitis.  She will take over-the-counter non-steroidal anti-inflammatory drugs.      3.  Discussed regarding her menorrhagia.  I told her she should see a gynecologist as ultrasound reveals few abnormalities including ovarian lesions and abnormal endometrial stripe.  She is going to make an appointment.      4.  Discussed regarding easy bruising.  She does not have definitive von Willebrand disease.  A von Willebrand factor activity is just mildly low.  This will be monitored as clinically indicated.      5.  She had a few questions, which were all answered.  I will see her after the IV iron.  She will call with any questions or concerns.         PAULA BLUNT MD             D: 2017 15:57   T: 2017 05:52   MT: bibi      Name:     DAYTON MULLEN   MRN:      [id number]        Account:      [id number]   :      1985           Visit Date:   2017      Document: [id number]       Again, thank you for allowing me to participate in the care of your patient.        Sincerely,        Paula Blunt MD

## 2017-12-20 NOTE — PROGRESS NOTES
HEMATOLOGY/ONCOLOGY HISTORY:  Judy Argueta is a female with lymphadenopathy.     1.  Patient has a history of lymphadenopathy for many years.  At age 16, she had a lump in the right neck which was biopsied and was benign.  At the age of 18, she had a lump in the right axilla, which was biopsied and was found to be benign.  At the age of 19, she again had a lump in the right neck which was biopsied and was benign.  None of those records are available.   2.  Patient had a CT of the neck done at [facility] in [place], [place] on 11/28/2013.  It revealed cervical lymphadenopathy which had mildly progressed from 2005 baseline.  It revealed mildly prominent cervical lymph nodes  bilaterally.  One at the right level 3 measured 1.6 x 0.6 x 4.1 cm.  The left level 3 lymph node measures 0.9 x 0.6 cm.   3.  CT of the neck, chest, abdomen and pelvis was done on 07/02/2014.  It revealed right sided level 3-4 lymph node measuring 1.5 x 0.8 x 4.2 cm.  Left sided level 2 lymph node measured up to 2 cm.  There was no lymphadenopathy or mass in the chest, abdomen and pelvis.   4.  CT of the neck was done on last 10/06/2014.  Lymph nodes had decreased slightly in size.  5. Multiple labs were done on 12/07/2017:   -Hemoglobin of 11 with an MCV of 80.  Retic of 0.7%.  Normal WBC and platelets.   -Iron of 26 with saturation of 7% and ferritin of 4.   -LDH of 130.   -TSH of 1.40.     SUBJECTIVE:  Ms. Judy Argueta is a 32-year-old female who was recently evaluated for multiple problems including microcytic anemia, fatigue, easy bruising and left hip pain.  Multiple investigations were ordered.  She is here for followup on the result.      Multiple labs were done on 12/07/2017:   -- Hemoglobin of 11 with an MCV of 80.  Retic of 0.7%.  Normal WBC and platelets.   -- Iron of 26 with saturation of 7% and ferritin of 4.   -- LDH of 130.   -- TSH of 1.40.   -- Normal factor VIII, von Willebrand antigen, Ristocetin Cofactor and multimer analysis.   von Willebrand factor activity slightly low at 49.      Pelvic ultrasound was done for menorrhagia.  It reveals 3 indeterminate right ovarian lesions measuring up to 2.4 cm.  The endometrial stripe is mildly heterogeneous.  There could be an underlying polyp.      For pain, she had MRI of the lumbar spine and hip done.  Lumbar spine MRI reveals a small central annular fissure with disk bulge at L4-L5 without stenosis.  MRI of the left hip reveals very mild edema surrounding the insertion of the left gluteus minimus tendon on anterior greater trochanter, could be due to mild tendinitis.  No fracture.  No avascular necrosis.      The patient continues to feel very weak.  She is currently having her period.  The flow is heavy.  She continues to have some easy bruising.      PHYSICAL EXAMINATION:   GENERAL:  She is alert and oriented x3.   VITAL SIGNS:  Reviewed.   Rest of the systems not examined.      ASSESSMENT:   1.  Iron deficiency anemia.   2.  Iron deficiency secondary to menorrhagia.   3.  Menorrhagia.   4.  Fatigue secondary to iron deficiency anemia.   5.  Left hip pain secondary to tendinitis.   6.  Easy bruising.   7.  Unintentional weight loss.   8.  Right ovarian indeterminate lesions and abnormal endometrial stripe.      PLAN:   1.  Labs and scans were all reviewed with the patient.      Discussed regarding her anemia and iron deficiency.  Iron deficiency anemia is secondary to her menorrhagia.      Patient has been on oral iron.  She has not been able to tolerate it.  Also, oral iron has not helped.  She continues to iron deficiency anemia.      Discussed with her regarding intravenous iron as she has malabsorption of iron.  Side effects including anaphylactic reactions were discussed.  She is agreeable for intravenous iron.  We will give her 2 doses of intravenous Injectafer.  This would help with iron deficiency anemia and fatigue.      We will recheck her labs including CBC and iron studies in 3-4  weeks after the intravenous iron.      2.  Discussed regarding left hip pain.  It is secondary to tendinitis.  She will take over-the-counter non-steroidal anti-inflammatory drugs.      3.  Discussed regarding her menorrhagia.  I told her she should see a gynecologist as ultrasound reveals few abnormalities including ovarian lesions and abnormal endometrial stripe.  She is going to make an appointment.      4.  Discussed regarding easy bruising.  She does not have definitive von Willebrand disease.  A von Willebrand factor activity is just mildly low.  This will be monitored as clinically indicated.      5.  She had a few questions, which were all answered.  I will see her after the IV iron.  She will call with any questions or concerns.         PAULA BLUNT MD             D: 2017 15:57   T: 2017 05:52   MT: bibi      Name:     DAYTON MULLEN   MRN:      [id number]        Account:      [id number]   :      1985           Visit Date:   2017      Document: [id number]

## 2017-12-27 ENCOUNTER — INFUSION THERAPY VISIT (OUTPATIENT)
Dept: INFUSION THERAPY | Facility: CLINIC | Age: 32
End: 2017-12-27
Attending: INTERNAL MEDICINE
Payer: COMMERCIAL

## 2017-12-27 VITALS
RESPIRATION RATE: 16 BRPM | DIASTOLIC BLOOD PRESSURE: 66 MMHG | SYSTOLIC BLOOD PRESSURE: 117 MMHG | TEMPERATURE: 98.5 F | HEART RATE: 67 BPM

## 2017-12-27 DIAGNOSIS — K90.9 MALABSORPTION OF IRON: ICD-10-CM

## 2017-12-27 DIAGNOSIS — D50.9 IRON DEFICIENCY ANEMIA, UNSPECIFIED IRON DEFICIENCY ANEMIA TYPE: Primary | ICD-10-CM

## 2017-12-27 PROCEDURE — 25000128 H RX IP 250 OP 636: Performed by: INTERNAL MEDICINE

## 2017-12-27 PROCEDURE — 96374 THER/PROPH/DIAG INJ IV PUSH: CPT

## 2017-12-27 RX ADMIN — FERRIC CARBOXYMALTOSE INJECTION 750 MG: 50 INJECTION, SOLUTION INTRAVENOUS at 14:36

## 2017-12-27 ASSESSMENT — PAIN SCALES - GENERAL: PAINLEVEL: NO PAIN (0)

## 2017-12-27 NOTE — MR AVS SNAPSHOT
After Visit Summary   12/27/2017    Judy Argueta    MRN: [id number]           Patient Information     Date Of Birth          1985        Visit Information        Provider Department      12/27/2017 2:00 PM  INFUSION CHAIR 13 [facility]        Today's Diagnoses     Iron deficiency anemia, unspecified iron deficiency anemia type    -  1    Malabsorption of iron           Follow-ups after your visit        Your next 10 appointments already scheduled     Jan 03, 2018  2:00 PM CST   Level 1 with  INFUSION CHAIR 18   [facility] ([facility] [place])    [facility]  [address]  [place] [address]   [phone]            Feb 05, 2018  3:30 PM CST   Return Visit with  Oncology Nurse   [facility] ([facility] [place])    [facility]  [address]  [place] [address]   [phone]            Feb 07, 2018  3:30 PM CST   Return Visit with Layo Joshi MD   [facility] ([facility] [place])    [facility]  [address]  [place] [address]   [phone]              Who to contact     If you have questions or need follow up information about today's clinic visit or your schedule please contact [facility] directly at [phone].  Normal or non-critical lab and imaging results will be communicated to you by MyChart, letter or phone within 4 business days after the clinic has received the results. If you do not hear from us within 7 days, please contact the clinic through MyChart or phone. If you have a critical or abnormal lab result, we will notify you by phone as soon as possible.  Submit refill requests through [employer] or call your pharmacy and they will forward the refill request to us. Please allow 3 business days for your refill to be completed.           Additional Information About Your Visit        MyChart Information     [employer] gives you secure access to your electronic health record. If you see a primary care provider, you can also send messages to your care team and make appointments. If you have questions, please call your primary care clinic.  If you do not have a primary care provider, please call [phone] and they will assist you.        Care EveryWhere ID     This is your Care EveryWhere ID. This could be used by other organizations to access your [place] medical records  [id number]        Your Vitals Were     Pulse Temperature Respirations             68 98.9  F (37.2  C) (Oral) 16          Blood Pressure from Last 3 Encounters:   12/27/17 121/70   12/19/17 114/71   12/07/17 123/78    Weight from Last 3 Encounters:   12/19/17 70 kg (154 lb 6.4 oz)   12/07/17 68 kg (150 lb)   08/04/16 65.5 kg (144 lb 4.8 oz)              Today, you had the following     No orders found for display       Primary Care Provider Office Phone # Fax #    Layo Joshi [phone]       [facility] [address] [address]        Equal Access to Services     TIM MARTINES : Hadii gurdeep paezo Sodada, waaxda baileyadaha, qaybta kaalmada adegersonyada, nabila nicole. So [facility] [phone].    ATENCIÓN: Si habla español, tiene a casarez disposición servicios gratuitos de asistencia lingüística. Llame al [phone].    We comply with applicable federal civil rights laws and [place] laws. We do not discriminate on the basis of race, color, national origin, age, disability, sex, sexual orientation, or gender identity.            Thank you!     Thank you for choosing [facility] AND [facility]  for your care. Our goal is always to provide you with excellent care. Hearing back from our patients is one way we can continue to improve our services. Please take a few minutes to complete  the written survey that you may receive in the mail after your visit with us. Thank you!             Your Updated Medication List - Protect others around you: Learn how to safely use, store and throw away your medicines at www.disposemymeds.org.          This list is accurate as of: 12/27/17  3:21 PM.  Always use your most recent med list.                   Brand Name Dispense Instructions for use Diagnosis    cholecalciferol 29228 UNITS capsule    VITAMIN D3    40 capsule    Take 1 capsule (50,000 Units) by mouth every 14 days SIG: TAKE ONE CAP EVERY OTHER WEEK, INDICATION: TO TREAT VITAMIN D DEFICIENCY (1ST AND 15TH OF EACH MONTH)    Vitamin D deficiency       Cyanocobalamin 5000 MCG/ML Liqd    B-12 SUPER STRENGTH    2 Bottle    Place 1 mL under the tongue daily FOR VITAMIN B12 SUPPLEMENTATION, PLEASE PLACE UNDER THE TONGUE    Fatigue, unspecified type       omega-3 acid ethyl esters 1 G capsule    Lovaza     Take 2 g by mouth 2 times daily        ZOLOFT PO      Take 150 mg by mouth daily

## 2018-01-03 ENCOUNTER — INFUSION THERAPY VISIT (OUTPATIENT)
Dept: INFUSION THERAPY | Facility: CLINIC | Age: 33
End: 2018-01-03
Attending: INTERNAL MEDICINE
Payer: COMMERCIAL

## 2018-01-03 VITALS
TEMPERATURE: 98.3 F | HEART RATE: 67 BPM | RESPIRATION RATE: 16 BRPM | SYSTOLIC BLOOD PRESSURE: 110 MMHG | DIASTOLIC BLOOD PRESSURE: 73 MMHG

## 2018-01-03 DIAGNOSIS — D50.9 IRON DEFICIENCY ANEMIA, UNSPECIFIED IRON DEFICIENCY ANEMIA TYPE: Primary | ICD-10-CM

## 2018-01-03 DIAGNOSIS — K90.9 MALABSORPTION OF IRON: ICD-10-CM

## 2018-01-03 PROCEDURE — 25000128 H RX IP 250 OP 636: Performed by: INTERNAL MEDICINE

## 2018-01-03 PROCEDURE — 96374 THER/PROPH/DIAG INJ IV PUSH: CPT

## 2018-01-03 RX ADMIN — FERRIC CARBOXYMALTOSE INJECTION 750 MG: 50 INJECTION, SOLUTION INTRAVENOUS at 14:19

## 2018-01-03 NOTE — PROGRESS NOTES
Infusion Nursing Note:  Judy Argueta presents today for injectafer.    Patient seen by provider today: No   present during visit today: Not Applicable.    Note: N/A.    Intravenous Access:  Peripheral IV placed.    Treatment Conditions:  Not Applicable.      Post Infusion Assessment:  Patient tolerated infusion without incident.  Patient observed for 30 minutes post injectafer per protocol.  Site patent and intact, free from redness, edema or discomfort.  No evidence of extravasations.  Access discontinued per protocol.    Discharge Plan:   AVS to patient via MYCHART.  Patient will return as pre henrik for next appointment.   Patient discharged in stable condition accompanied by: self.  Departure Mode: Ambulatory.    Karl Corea RN

## 2018-01-03 NOTE — MR AVS SNAPSHOT
After Visit Summary   1/3/2018    Judy Argueta    MRN: [id number]           Patient Information     Date Of Birth          1985        Visit Information        Provider Department      1/3/2018 2:00 PM  INFUSION CHAIR 18 [facility]        Today's Diagnoses     Iron deficiency anemia, unspecified iron deficiency anemia type    -  1    Malabsorption of iron           Follow-ups after your visit        Your next 10 appointments already scheduled     Feb 05, 2018  3:30 PM CST   Return Visit with  Oncology Nurse   [facility] ([facility])    [facility]  [address]  [place] [address]   [phone]            Feb 07, 2018  3:30 PM CST   Return Visit with Layo Joshi MD   [facility] ([facility])    [facility]  [address]  [place] [address] [phone]              Who to contact     If you have questions or need follow up information about today's clinic visit or your schedule please contact [facility] directly at [phone].  Normal or non-critical lab and imaging results will be communicated to you by Cuehart, letter or phone within 4 business days after the clinic has received the results. If you do not hear from us within 7 days, please contact the clinic through Cuehart or phone. If you have a critical or abnormal lab result, we will notify you by phone as soon as possible.  Submit refill requests through [employer] or call your pharmacy and they will forward the refill request to us. Please allow 3 business days for your refill to be completed.          Additional Information About Your Visit        MyChart Information     [employer] gives you secure access to your electronic health record. If you see a primary care provider, you can also send messages to your care team and make appointments.  If you have questions, please call your primary care clinic.  If you do not have a primary care provider, please call [phone] and they will assist you.        Care EveryWhere ID     This is your Care EveryWhere ID. This could be used by other organizations to access your [facility] medical records  [id number]        Your Vitals Were     Pulse Temperature Respirations             67 98.3  F (36.8  C) (Oral) 16          Blood Pressure from Last 3 Encounters:   01/03/18 110/73   12/27/17 117/66   12/19/17 114/71    Weight from Last 3 Encounters:   12/19/17 70 kg (154 lb 6.4 oz)   12/07/17 68 kg (150 lb)   08/04/16 65.5 kg (144 lb 4.8 oz)              Today, you had the following     No orders found for display       Primary Care Provider Office Phone # Fax #    Layo Joshi [phone]       [facility] [address] [address]        Equal Access to Services     WENDY MARTINES : Hadii aad ku hadasho Soomaali, waaxda luqadaha, qaybta kaalmada adeegyada, nabila valencia . So [facility] [phone].    ATENCIÓN: Si habla español, tiene a casarez disposición servicios gratuitos de asistencia lingüística. Llame al [phone].    We comply with applicable federal civil rights laws and [place] laws. We do not discriminate on the basis of race, color, national origin, age, disability, sex, sexual orientation, or gender identity.            Thank you!     Thank you for choosing [facility]  for your care. Our goal is always to provide you with excellent care. Hearing back from our patients is one way we can continue to improve our services. Please take a few minutes to complete the written survey that you may receive in the mail after your visit with us. Thank you!             Your Updated Medication List - Protect others around you: Learn how to safely use, store and throw away your medicines at www.disposemymeds.org.           This list is accurate as of: 1/3/18  3:00 PM.  Always use your most recent med list.                   Brand Name Dispense Instructions for use Diagnosis    cholecalciferol 68997 UNITS capsule    VITAMIN D3    40 capsule    Take 1 capsule (50,000 Units) by mouth every 14 days SIG: TAKE ONE CAP EVERY OTHER WEEK, INDICATION: TO TREAT VITAMIN D DEFICIENCY (1ST AND 15TH OF EACH MONTH)    Vitamin D deficiency       Cyanocobalamin 5000 MCG/ML Liqd    B-12 SUPER STRENGTH    2 Bottle    Place 1 mL under the tongue daily FOR VITAMIN B12 SUPPLEMENTATION, PLEASE PLACE UNDER THE TONGUE    Fatigue, unspecified type       omega-3 acid ethyl esters 1 G capsule    Lovaza     Take 2 g by mouth 2 times daily        ZOLOFT PO      Take 150 mg by mouth daily

## 2018-01-30 ENCOUNTER — OFFICE VISIT (OUTPATIENT)
Dept: OBGYN | Facility: CLINIC | Age: 33
End: 2018-01-30
Payer: COMMERCIAL

## 2018-01-30 VITALS
HEIGHT: 68 IN | HEART RATE: 76 BPM | BODY MASS INDEX: 22.4 KG/M2 | WEIGHT: 147.8 LBS | DIASTOLIC BLOOD PRESSURE: 87 MMHG | TEMPERATURE: 98.3 F | SYSTOLIC BLOOD PRESSURE: 131 MMHG

## 2018-01-30 DIAGNOSIS — N92.0 MENORRHAGIA WITH REGULAR CYCLE: Primary | ICD-10-CM

## 2018-01-30 DIAGNOSIS — Z12.4 SCREENING FOR MALIGNANT NEOPLASM OF CERVIX: ICD-10-CM

## 2018-01-30 DIAGNOSIS — M89.9 DISORDER OF PELVIC GIRDLE: ICD-10-CM

## 2018-01-30 DIAGNOSIS — N83.201 CYST OF RIGHT OVARY: ICD-10-CM

## 2018-01-30 PROCEDURE — 87624 HPV HI-RISK TYP POOLED RSLT: CPT | Performed by: OBSTETRICS & GYNECOLOGY

## 2018-01-30 PROCEDURE — G0145 SCR C/V CYTO,THINLAYER,RESCR: HCPCS | Performed by: OBSTETRICS & GYNECOLOGY

## 2018-01-30 PROCEDURE — 99214 OFFICE O/P EST MOD 30 MIN: CPT | Performed by: OBSTETRICS & GYNECOLOGY

## 2018-01-30 NOTE — NURSING NOTE
"Chief Complaint   Patient presents with     Consult     consult ovarian cysts, uterine polyps, anemia.      Health Maintenance     pap smear        Initial /87  Pulse 76  Temp 98.3  F (36.8  C) (Oral)  Ht 5' 7.5\" (1.715 m)  Wt 147 lb 12.8 oz (67 kg)  LMP 2018  BMI 22.81 kg/m2 Estimated body mass index is 22.81 kg/(m^2) as calculated from the following:    Height as of this encounter: 5' 7.5\" (1.715 m).    Weight as of this encounter: 147 lb 12.8 oz (67 kg).  BP completed using cuff size: regular        The following HM Due: pap smear      The following patient reported/Care Every where data was sent to:  P ABSTRACT QUALITY INITIATIVES [96408]        patient has appointment for today    Ursula Larson CMA                "

## 2018-01-30 NOTE — Clinical Note
Surgeon: Maria Dolores Kincaid Assistant: no  Procedure: hysteroscopic myomectomy Diagnosis: intrauterine mass Length of surgery: 1 hour Morning admit: No Day/Time Preference: block time, prefer not NB day, ok for day off or call Anesthesia: IV sedation Pre-op: Here Latex Allergy: No Want pre op labs done: No

## 2018-01-30 NOTE — MR AVS SNAPSHOT
After Visit Summary   1/30/2018    Judy Argueta    MRN: [id number]           Patient Information     Date Of Birth          1985        Visit Information        Provider Department      1/30/2018 9:00 AM Maria Dolores Kincaid MD [facility]        Today's Diagnoses     Menorrhagia with regular cycle    -  1    Screening for malignant neoplasm of cervix        Disorder of pelvic girdle        Cyst of right ovary           Follow-ups after your visit        Additional Services     [facility] PT, HAND, AND CHIROPRACTIC REFERRAL       **This order will print in the [facility] Scheduling Office**    Physical Therapy, Hand Therapy and Chiropractic Care are available through:    *[facility]  *[facility]  *[facility]    Call one number to schedule at any of the above locations: [phone].    Your provider has referred you to: Physical Therapy at [facility] or [facility]    Indication/Reason for Referral: Women's Health (Please Complete Special Programs SmartList)  Onset of Illness: 3 years  Therapy Orders: Evaluate and Treat  Special Programs: None and Women's Health: Pelvic Dysfunction: Dyspareunia: levator ani and obturator internus, left greater than right and  Biofeedback  Special Request: None    Michael Rodriguez      Additional Comments for the Therapist or Chiropractor: same sex partner    Please be aware that coverage of these services is subject to the terms and limitations of your health insurance plan.  Call member services at your health plan with any benefit or coverage questions.      Please bring the following to your appointment:    *Your personal calendar for scheduling future appointments  *Comfortable clothing                  Your next 10 appointments already scheduled     Feb 05, 2018  3:30 PM CST   Return Visit with  Oncology Nurse   [facility] ([facility])    [facility] [place]  [address]  [place] [address]   [phone]            Feb 07, 2018  3:30 PM CST   Return Visit with Layo Joshi MD   [facility])    [facility]  [address]  [place] [address]   [phone]              Future tests that were ordered for you today     Open Future Orders        Priority Expected Expires Ordered    US Pelvic Complete with Transvaginal Routine 4/30/2018 7/29/2018 1/30/2018            Who to contact     If you have questions or need follow up information about today's clinic visit or your schedule please contact [facility] directly at [phone].  Normal or non-critical lab and imaging results will be communicated to you by MyChart, letter or phone within 4 business days after the clinic has received the results. If you do not hear from us within 7 days, please contact the clinic through MyChart or phone. If you have a critical or abnormal lab result, we will notify you by phone as soon as possible.  Submit refill requests through [employer] or call your pharmacy and they will forward the refill request to us. Please allow 3 business days for your refill to be completed.          Additional Information About Your Visit        [employer]ar[employer] Flow Information     [employer] gives you secure access to your electronic health record. If you see a primary care provider, you can also send messages to your care team and make appointments. If you have questions, please call your primary care clinic.  If you do not have a primary care provider, please call [phone] and they will assist you.        Care EveryWhere ID     This is your Care EveryWhere ID. This could be used by other organizations to access your [place] medical records  [id number]        Your Vitals Were     Pulse Temperature Height Last Period BMI (Body Mass Index)       76 98.3  F (36.8  C) (Oral) 5' 7.5\" (1.715 m) 01/13/2018 22.81 kg/m2        " Blood Pressure from Last 3 Encounters:   01/30/18 131/87   01/03/18 110/73   12/27/17 117/66    Weight from Last 3 Encounters:   01/30/18 147 lb 12.8 oz (67 kg)   12/19/17 154 lb 6.4 oz (70 kg)   12/07/17 150 lb (68 kg)              We Performed the Following     HPV High Risk Types DNA Cervical     [facility] PT, HAND, AND CHIROPRACTIC REFERRAL     Pap imaged thin layer screen with HPV - recommended age 30 - 65 years (select HPV order below)        Primary Care Provider Office Phone # Fax #    Layo Joshi [phone]       [facility] [address] [address]        Equal Access to Services     TIM MARTINES : Hadii gurdeep paezo Sodada, waaxda luqadaha, qaybta kaalmada adegersonyaluke, nabial nicole. So [facility] [phone].    ATENCIÓN: Si habla español, tiene a casarez disposición servicios gratuitos de asistencia lingüística. Llame al [phone].    We comply with applicable federal civil rights laws and [place] laws. We do not discriminate on the basis of race, color, national origin, age, disability, sex, sexual orientation, or gender identity.            Thank you!     Thank you for choosing [facility]  for your care. Our goal is always to provide you with excellent care. Hearing back from our patients is one way we can continue to improve our services. Please take a few minutes to complete the written survey that you may receive in the mail after your visit with us. Thank you!             Your Updated Medication List - Protect others around you: Learn how to safely use, store and throw away your medicines at www.disposemymeds.org.          This list is accurate as of 1/30/18 10:01 PM.  Always use your most recent med list.                   Brand Name Dispense Instructions for use Diagnosis    cholecalciferol 54673 UNITS capsule    VITAMIN D3    40 capsule    Take 1 capsule (50,000 Units) by mouth every 14 days SIG: TAKE ONE CAP  EVERY OTHER WEEK, INDICATION: TO TREAT VITAMIN D DEFICIENCY (1ST AND 15TH OF EACH MONTH)    Vitamin D deficiency       Cyanocobalamin 5000 MCG/ML Liqd    B-12 SUPER STRENGTH    2 Bottle    Place 1 mL under the tongue daily FOR VITAMIN B12 SUPPLEMENTATION, PLEASE PLACE UNDER THE TONGUE    Fatigue, unspecified type       omega-3 acid ethyl esters 1 G capsule    Lovaza     Take 2 g by mouth 2 times daily        ZOLOFT PO      Take 150 mg by mouth daily

## 2018-01-30 NOTE — PROGRESS NOTES
Pre-op H&P  CC/HPI:  32 year old  presents for discussion of heavy periods.   Present x 3-4 years. Onset insidious.  Causing significant pelvic pain, wendy left hip pain.   Not able to have penetrative intercourse with her partner due to pain.   Does HIIT.   Getting fatigued.   Nothing alleviates pain.   Penetrative sex exacerbates.   Doesn't radiate.     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     Social History Main Topics     Smoking status: Never Smoker     Smokeless tobacco: Never Used     Alcohol use Yes      Comment: x1 glass of wine every couple of weeks     Drug use: No     Sexual activity: Yes     Partners: Female     Other Topics Concern     Not on file     Social History Narrative         Current Outpatient Prescriptions:      omega-3 acid ethyl esters (LOVAZA) 1 G capsule, Take 2 g by mouth 2 times daily, Disp: , Rfl:      cholecalciferol (VITAMIN D3) 08887 UNITS capsule, Take 1 capsule (50,000 Units) by mouth every 14 days SIG: TAKE ONE CAP EVERY OTHER WEEK, INDICATION: TO TREAT VITAMIN D DEFICIENCY ( AND 15TH OF EACH MONTH), Disp: 40 capsule, Rfl: 0     Cyanocobalamin (B-12 SUPER STRENGTH) 5000  "MCG/ML LIQD, Place 1 mL under the tongue daily FOR VITAMIN B12 SUPPLEMENTATION, PLEASE PLACE UNDER THE TONGUE, Disp: 2 Bottle, Rfl: PRN     Sertraline HCl (ZOLOFT PO), Take 150 mg by mouth daily , Disp: , Rfl:     Allergies   Allergen Reactions     Codeine      Darvocet [Propoxyphene N-Apap]      Dilaudid [Hydromorphone]      Morphine      Oxycontin [Oxycodone]      Percocet [Oxycodone-Acetaminophen]      Vicodin [Hydrocodone-Acetaminophen]        Exam:  Vitals:    01/30/18 0905   BP: 131/87   Pulse: 76   Temp: 98.3  F (36.8  C)   TempSrc: Oral   Weight: 147 lb 12.8 oz (67 kg)   Height: 5' 7.5\" (1.715 m)     Body mass index is 22.81 kg/(m^2).     Exam:  Constitutional: healthy, alert and no distress  Head: Normocephalic. No masses, lesions, tenderness or abnormalities  Neck: Neck supple. No adenopathy. Thyroid symmetric, normal size,  ENT: ENT exam normal, no neck nodes or sinus tenderness  Cardiovascular: negative. RRR. No murmurs, clicks gallops or rub, No edema or JVD.  Respiratory: negative Good diaphragmatic excursion. Lungs clear  Gastrointestinal: Abdomen soft, non-tender. BS normal. No masses, organomegaly  : Normal external genitalia without lesions and uterus normal in size and contour. Adnexa normal.   Musculoskeletal: extremities normal- no gross deformities noted, gait normal and normal muscle tone. Difficult to do speculum exam due to muscle tension. Levator ani bilaterally tense and tender, left greater than right. Obturator internus bilaterally tense and tender, left greater than right.   Skin: no suspicious lesions or rashes  Neurologic: Gait normal. Sensation grossly WNL.  Psychiatric: mentation appears normal and affect normal/bright  Hematologic/Lymphatic/Immunologic: Normal cervical lymph nodes    ULTRASOUND PELVIS WITH TRANSVAGINAL IMAGING   12/13/2017 9:22 AM      HISTORY: Menorrhagia.     COMPARISON: 7/2/2014 - CT chest, abdomen and pelvis.     TECHNIQUE: Endovaginal imaging was also " performed to better evaluate  the uterus.     FINDINGS: The uterus is anteflexed, measuring 7.4 x 4.5 x 5.9 cm in  long axis, short axis and transverse dimensions respectively. No  myometrial masses. The endometrial stripe is mildly heterogeneous,  measuring 1.7 cm in thickness. Blood flow is visualized within a  portion of the endometrial stripe. The right and left ovaries measure  3.7 x 3.6 x 2.4 cm and 3.2 x 3.0 x 1.5 cm respectively. Three lesions  are present in the right ovary as follows: A 2.4 x 1.7 x 1.5 cm cystic  structure containing internal echogenicities, a 2.0 x 1.8 x 1.5 cm  isoechoic lesion, and a 1.4 x 1.4 x 1.2 cm hypoechoic lesion. No blood  flow is visualized within these lesions The left ovary is  unremarkable. No adnexal masses. No free fluid in the pelvis.         IMPRESSION:   1. The endometrial stripe is mildly heterogeneous, measuring 1.7 cm in  thickness. Additionally, there is a focus of blood flow visualized  within the endometrial stripe. An underlying polyp is possible. If  clinically relevant, a hysterosonogram could be considered for further  evaluation.  2. Three indeterminate right ovarian lesions measuring up to 2.4 cm in  diameter. These are nonspecific, but could be hemorrhagic cysts. A  follow-up ultrasound would be useful in 3 months for reevaluation.     MANISHA BAJWA MD      A/P:  32 year old  with   1. Endometrial thickening  - Appears to be a fibroid or polyp  - Reviewed ultrasound images and report.   - Recommend hysteroscopy polypectomy vs myomectomy. Ultrasound to further characterize prior to surgery  - Discussed procedure, risks, alternatives. Questions were answered. Discussed day of surgery, recovery.   - Discussed that surgery may lessen but not resolve menorrhagia. Consider Mirena or Kyleena.    2. Ovarian cyst  - discussed functional versus benign neoplasm  - repeat ultrasound prior to surgery for further characterization, surgical planning.     3.  Pelvic floor myalgia.   - Discussed exam findings. Difficult to insert regular joshua, used pediatric joshua.  - Discussed dysmenorrhea may be primary source of left hip pain. Discussed pelvic floor muscle reaction to pain.   - Discussed pelvic floor PT, process, outcomes.

## 2018-02-01 LAB
COPATH REPORT: NORMAL
PAP: NORMAL

## 2018-02-05 ENCOUNTER — ONCOLOGY VISIT (OUTPATIENT)
Dept: ONCOLOGY | Facility: CLINIC | Age: 33
End: 2018-02-05
Attending: INTERNAL MEDICINE
Payer: COMMERCIAL

## 2018-02-05 ENCOUNTER — HOSPITAL ENCOUNTER (OUTPATIENT)
Facility: CLINIC | Age: 33
Setting detail: SPECIMEN
Discharge: HOME | End: 2018-02-05
Attending: INTERNAL MEDICINE | Admitting: INTERNAL MEDICINE
Payer: COMMERCIAL

## 2018-02-05 DIAGNOSIS — D50.9 IRON DEFICIENCY ANEMIA, UNSPECIFIED IRON DEFICIENCY ANEMIA TYPE: ICD-10-CM

## 2018-02-05 LAB
BASOPHILS # BLD AUTO: 0 10E9/L (ref 0–0.2)
BASOPHILS NFR BLD AUTO: 0.4 %
DIFFERENTIAL METHOD BLD: ABNORMAL
EOSINOPHIL # BLD AUTO: 0.1 10E9/L (ref 0–0.7)
EOSINOPHIL NFR BLD AUTO: 1.9 %
ERYTHROCYTE [DISTWIDTH] IN BLOOD BY AUTOMATED COUNT: 18.8 % (ref 10–15)
FERRITIN SERPL-MCNC: 207 NG/ML (ref 12–150)
FINAL DIAGNOSIS: NORMAL
HCT VFR BLD AUTO: 40.4 % (ref 35–47)
HGB BLD-MCNC: 13.9 G/DL (ref 11.7–15.7)
HPV HR 12 DNA CVX QL NAA+PROBE: NEGATIVE
HPV16 DNA SPEC QL NAA+PROBE: NEGATIVE
HPV18 DNA SPEC QL NAA+PROBE: NEGATIVE
IMM GRANULOCYTES # BLD: 0 10E9/L (ref 0–0.4)
IMM GRANULOCYTES NFR BLD: 0.1 %
IRON SATN MFR SERPL: 37 % (ref 15–46)
IRON SERPL-MCNC: 83 UG/DL (ref 35–180)
LYMPHOCYTES # BLD AUTO: 2 10E9/L (ref 0.8–5.3)
LYMPHOCYTES NFR BLD AUTO: 27.1 %
MCH RBC QN AUTO: 30.1 PG (ref 26.5–33)
MCHC RBC AUTO-ENTMCNC: 34.4 G/DL (ref 31.5–36.5)
MCV RBC AUTO: 87 FL (ref 78–100)
MONOCYTES # BLD AUTO: 0.3 10E9/L (ref 0–1.3)
MONOCYTES NFR BLD AUTO: 4.7 %
NEUTROPHILS # BLD AUTO: 4.8 10E9/L (ref 1.6–8.3)
NEUTROPHILS NFR BLD AUTO: 65.8 %
NRBC # BLD AUTO: 0 10*3/UL
NRBC BLD AUTO-RTO: 0 /100
PLATELET # BLD AUTO: 219 10E9/L (ref 150–450)
RBC # BLD AUTO: 4.62 10E12/L (ref 3.8–5.2)
RETICS # AUTO: 41.6 10E9/L (ref 25–95)
RETICS/RBC NFR AUTO: 0.9 % (ref 0.5–2)
SPECIMEN DESCRIPTION: NORMAL
SPECIMEN SOURCE CVX/VAG CYTO: NORMAL
TIBC SERPL-MCNC: 223 UG/DL (ref 240–430)
WBC # BLD AUTO: 7.3 10E9/L (ref 4–11)

## 2018-02-05 PROCEDURE — 83540 ASSAY OF IRON: CPT | Performed by: INTERNAL MEDICINE

## 2018-02-05 PROCEDURE — 85025 COMPLETE CBC W/AUTO DIFF WBC: CPT | Performed by: INTERNAL MEDICINE

## 2018-02-05 PROCEDURE — 85045 AUTOMATED RETICULOCYTE COUNT: CPT | Performed by: INTERNAL MEDICINE

## 2018-02-05 PROCEDURE — 36415 COLL VENOUS BLD VENIPUNCTURE: CPT

## 2018-02-05 PROCEDURE — 82728 ASSAY OF FERRITIN: CPT | Performed by: INTERNAL MEDICINE

## 2018-02-05 PROCEDURE — 83550 IRON BINDING TEST: CPT | Performed by: INTERNAL MEDICINE

## 2018-02-05 NOTE — LETTER
2/5/2018         RE: Judy Argueta  [address]  [facility] [address]        Dear Colleague,    Thank you for referring your patient, Judy Argueta, to the [facility]. Please see a copy of my visit note below.    Medical Assistant Note:  Judy Argueta presents today for lab only.    Patient seen by provider today: No.   present during visit today: Not Applicable.    Concerns: No Concerns.    Procedure:  Lab draw site: RAC, Needle type: BF, Gauge: 21.    Post Assessment:  Labs drawn without difficulty: Yes.    Discharge Plan:  Departure Mode: Ambulatory.    Face to Face Time: 5 minutes.    Ondina Melchor MA              Again, thank you for allowing me to participate in the care of your patient.        Sincerely,        Oncology Nurse

## 2018-02-05 NOTE — MR AVS SNAPSHOT
After Visit Summary   2/5/2018    Judy Argueta    MRN: [id number]           Patient Information     Date Of Birth          1985        Visit Information        Provider Department      2/5/2018 3:30 PM Nurse,  Oncology [facility]        Today's Diagnoses     Iron deficiency anemia, unspecified iron deficiency anemia type           Follow-ups after your visit        Your next 10 appointments already scheduled     Feb 05, 2018  3:30 PM CST   Return Visit with  Oncology Nurse   [facility]    [facility]  [address]  [facility] [address]   [phone]            Feb 07, 2018  3:30 PM CST   Return Visit with Layo Joshi MD   [facility]    [facility]  [address]  [facility] [address]   [phone]            Feb 12, 2018  8:40 AM CST   US PELVIC COMPLETE W TRANSVAGINAL with RDUS1   [facility] ([facility]    [address] [place] [id number] [phone]           Please bring a list of your medicines (including vitamins, minerals and over-the-counter drugs). Also, tell your doctor about any allergies you may have. Wear comfortable clothes and leave your valuables at home.  Adults: Drink six 8-ounce glasses of fluid one hour before your exam. Do NOT empty your bladder.  If you need to empty your bladder before your exam, try to release only a little bit of urine. Then, drink another 8oz glass of fluid.  Children: Children who are potty trained should drink at least 4 cups (32 oz) of liquid 45 minutes to one hour prior to the exam. The child s bladder must be full in order to achieve a diagnostic exam. If your child is very uncomfortable or has an urgent need to pee, please notify a technologist; they will try to find out how much longer the wait may be and provide  instructions to help relieve the pressure. Occasionally it is medically necessary to insert a urinary catheter to fill the bladder.  Please call the Imaging Department at your exam site with any questions.            Feb 12, 2018  9:30 AM CST   SHORT with Maria Dolores Kincaid MD   [facility]    [address] [address] [phone]            Feb 21, 2018   Procedure with Maria Dolores Kincaid MD   [facility], Same Day Surgery (--)    [facility]  [facility] [place] [address] [phone]              Who to contact     If you have questions or need follow up information about today's clinic visit or your schedule please contact [facility] directly at [phone].  Normal or non-critical lab and imaging results will be communicated to you by MyChart, letter or phone within 4 business days after the clinic has received the results. If you do not hear from us within 7 days, please contact the clinic through [employer] or phone. If you have a critical or abnormal lab result, we will notify you by phone as soon as possible.  Submit refill requests through myZamana or call your pharmacy and they will forward the refill request to us. Please allow 3 business days for your refill to be completed.          Additional Information About Your Visit        MyChart Information     myZamana gives you secure access to your electronic health record. If you see a primary care provider, you can also send messages to your care team and make appointments. If you have questions, please call your primary care clinic.  If you do not have a primary care provider, please call [phone] and they will assist you.        Care EveryWhere ID     This is your Care EveryWhere ID. This could be used by other organizations to access your [facility] medical records  [id number]        Your Vitals Were     Last Period                   01/13/2018             Blood Pressure from Last 3 Encounters:   01/30/18 131/87   01/03/18 110/73   12/27/17 117/66    Weight from Last 3 Encounters:   01/30/18 67 kg (147 lb 12.8 oz)   12/19/17 70 kg (154 lb 6.4 oz)   12/07/17 68 kg (150 lb)              We Performed the Following     CBC with platelets differential     Ferritin     Iron and iron binding capacity     Reticulocyte count        Primary Care Provider Office Phone # Fax #    Layo Joshi [phone]       [facility] [address]  [facility] [address]        Equal Access to Services     WENDY MARTINES : Hadii gurdeep Sheldon, evan mario, bruno shannonmaluke courtney, nabila nicole. So [facility] [phone].    ATENCIÓN: Si habla español, tiene a casarez disposición servicios gratuitos de asistencia lingüística. [facility] [phone].    We comply with applicable federal civil rights laws and [place] laws. We do not discriminate on the basis of race, color, national origin, age, disability, sex, sexual orientation, or gender identity.            Thank you!     Thank you for choosing [facility]  for your care. Our goal is always to provide you with excellent care. Hearing back from our patients is one way we can continue to improve our services. Please take a few minutes to complete the written survey that you may receive in the mail after your visit with us. Thank you!             Your Updated Medication List - Protect others around you: Learn how to safely use, store and throw away your medicines at www.disposemymeds.org.          This list is accurate as of 2/5/18  3:26 PM.  Always use your most recent med list.                   Brand Name Dispense Instructions for use Diagnosis    cholecalciferol 51004 UNITS capsule    VITAMIN D3    40 capsule    Take 1 capsule (50,000 Units) by mouth every 14 days SIG: TAKE ONE CAP EVERY OTHER WEEK, INDICATION: TO TREAT VITAMIN D DEFICIENCY (1ST AND  15TH OF EACH MONTH)    Vitamin D deficiency       Cyanocobalamin 5000 MCG/ML Liqd    B-12 SUPER STRENGTH    2 Bottle    Place 1 mL under the tongue daily FOR VITAMIN B12 SUPPLEMENTATION, PLEASE PLACE UNDER THE TONGUE    Fatigue, unspecified type       omega-3 acid ethyl esters 1 G capsule    Lovaza     Take 2 g by mouth 2 times daily        ZOLOFT PO      Take 150 mg by mouth daily

## 2018-02-05 NOTE — PROGRESS NOTES
Medical Assistant Note:  Judy Argueta presents today for lab only.    Patient seen by provider today: No.   present during visit today: Not Applicable.    Concerns: No Concerns.    Procedure:  Lab draw site: RAC, Needle type: BF, Gauge: 21.    Post Assessment:  Labs drawn without difficulty: Yes.    Discharge Plan:  Departure Mode: Ambulatory.    Face to Face Time: 5 minutes.    Ondina Melchor MA

## 2018-02-07 ENCOUNTER — ONCOLOGY VISIT (OUTPATIENT)
Dept: ONCOLOGY | Facility: CLINIC | Age: 33
End: 2018-02-07
Attending: INTERNAL MEDICINE
Payer: COMMERCIAL

## 2018-02-07 VITALS
RESPIRATION RATE: 16 BRPM | BODY MASS INDEX: 22.78 KG/M2 | DIASTOLIC BLOOD PRESSURE: 80 MMHG | TEMPERATURE: 98.6 F | HEART RATE: 64 BPM | WEIGHT: 147.6 LBS | OXYGEN SATURATION: 97 % | SYSTOLIC BLOOD PRESSURE: 118 MMHG

## 2018-02-07 DIAGNOSIS — N92.4 EXCESSIVE BLEEDING IN PREMENOPAUSAL PERIOD: ICD-10-CM

## 2018-02-07 DIAGNOSIS — D50.9 IRON DEFICIENCY ANEMIA, UNSPECIFIED IRON DEFICIENCY ANEMIA TYPE: Primary | ICD-10-CM

## 2018-02-07 PROCEDURE — G0463 HOSPITAL OUTPT CLINIC VISIT: HCPCS

## 2018-02-07 PROCEDURE — 99214 OFFICE O/P EST MOD 30 MIN: CPT | Performed by: INTERNAL MEDICINE

## 2018-02-07 ASSESSMENT — PAIN SCALES - GENERAL: PAINLEVEL: NO PAIN (0)

## 2018-02-07 NOTE — PROGRESS NOTES
"Oncology Rooming Note    February 7, 2018 3:27 PM   Judy Argueta is a 32 year old female who presents for:    Chief Complaint   Patient presents with     Oncology Clinic Visit     Initial Vitals: /80 (BP Location: Left arm, Patient Position: Sitting, Cuff Size: Adult Regular)  Pulse 64  Temp 98.6  F (37  C) (Oral)  Resp 16  Wt 67 kg (147 lb 9.6 oz)  LMP 01/13/2018  SpO2 97%  BMI 22.78 kg/m2 Estimated body mass index is 22.78 kg/(m^2) as calculated from the following:    Height as of 1/30/18: 1.715 m (5' 7.5\").    Weight as of this encounter: 67 kg (147 lb 9.6 oz). Body surface area is 1.79 meters squared.  No Pain (0) Comment: Data Unavailable   Patient's last menstrual period was 01/13/2018.  Allergies reviewed: Yes  Medications reviewed: Yes    Medications: Medication refills not needed today.  Pharmacy name entered into [facility]:    [facility] [address]  [facility] [address] [facility] [address] [facility] [address] [facility] [address] [facility] [address] [facility] [address] [facility]    Clinical concerns: no     5 minutes for nursing intake (face to face time)          Adilia Dougherty MA              "

## 2018-02-07 NOTE — PROGRESS NOTES
Visit Date:   02/07/2018     HEMATOLOGY/ONCOLOGY HISTORY:  Judy Argueta is a female with lymphadenopathy.     1.  Patient has a history of lymphadenopathy for many years.  At age 16, she had a lump in the right neck which was biopsied and was benign.  At the age of 18, she had a lump in the right axilla, which was biopsied and was found to be benign.  At the age of 19, she again had a lump in the right neck which was biopsied and was benign.  None of those records are available.   2.  Patient had a CT of the neck done at [facility] in [place], [place] on 11/28/2013.  It revealed cervical lymphadenopathy which had mildly progressed from 2005 baseline.  It revealed mildly prominent cervical lymph nodes  bilaterally.  One at the right level 3 measured 1.6 x 0.6 x 4.1 cm.  The left level 3 lymph node measures 0.9 x 0.6 cm.   3.  CT of the neck, chest, abdomen and pelvis was done on 07/02/2014.  It revealed right sided level 3-4 lymph node measuring 1.5 x 0.8 x 4.2 cm.  Left sided level 2 lymph node measured up to 2 cm.  There was no lymphadenopathy or mass in the chest, abdomen and pelvis.   4.  CT of the neck was done on last 10/06/2014.  Lymph nodes had decreased slightly in size.  5. Multiple labs were done on 12/07/2017:   -Hemoglobin of 11 with an MCV of 80.  Retic of 0.7%.  Normal WBC and platelets.   -Iron of 26 with saturation of 7% and ferritin of 4.   -LDH of 130.   -TSH of 1.40.      SUBJECTIVE:    Ms. Argueta is a 32-year-old female with microcytic anemia secondary to iron deficiency. Her iron deficiency is secondary to menorrhagia.  She has been seen by a gynecologist.  She is going to have a hysteroscopy done on February 21st.       For severe iron deficiency which is not responding to oral iron, the patient was given 2 doses of IV Injectafer.  The patient said that symptomatically she felt better.  Her fatigue has improved.  Dizziness has improved.  She is more energetic.  Muscle cramp all improved.       Patient recently had her menstrual bleed. She has started to feel weak after that.      REVIEW OF SYSTEMS:  No headache.  No dizziness.  No chest pain or difficulty breathing.  No abdominal pain, nausea or vomiting.  No urinary complaints.      PHYSICAL EXAMINATION:   GENERAL:  She is alert, oriented x 3.   VITAL SIGNS:  Reviewed.   Rest of the system not examined.      LABORATORY DATA:  Reviewed.      ASSESSMENT:   1.  A 32-year-old female with iron deficiency anemia secondary to menorrhagia.   2.  Menorrhagia.   3.  Fatigue and lightheadedness, which improved with IV iron.      PLAN:   1.  Labs were reviewed with the patient.  She was happy to know that her hemoglobin, iron and ferritin are all normal.  She is not iron deficient anymore.   2.  The patient has menorrhagia.  Her gynecologist has recommended IUD.  I encouraged her to consider IUD.  If her menstrual bleeding stops, anemia and iron deficiency should not happen.  She is going to consider IUD.   3.  I told the patient that she can become iron deficient again in the future.  I advised her to follow up in 6 months' time with labs including CBC and iron studies.     4.  Patient is having pain in her left hip and lower back. As per the patient, this is pelvic pain.  Her gynecologist examined her and told her this is pelvic pain. So we are getting some physical therapy.     5.  Patient had a few questions, which were all answered.  I will see her in 6 months.  The patient advised to call our office if she has worsening weakness, dizziness, shortness of breath, muscle cramps or any other concerns.         PAULA BLUNT MD             D: 2018   T: 2018   MT: SOLIS      Name:     DAYTON MULLEN   MRN:      [id number]        Account:      [id number]   :      1985           Visit Date:   2018      Document: [id number]

## 2018-02-07 NOTE — MR AVS SNAPSHOT
After Visit Summary   2/7/2018    Judy Argueta    MRN: [id number]           Patient Information     Date Of Birth          1985        Visit Information        Provider Department      2/7/2018 3:30 PM Layo Joshi MD [facility]        Today's Diagnoses     Iron deficiency anemia, unspecified iron deficiency anemia type    -  1    Excessive bleeding in premenopausal period          Care Instructions    Follow up in 6 months with labs.          Follow-ups after your visit        Your next 10 appointments already scheduled     Feb 12, 2018  8:40 AM CST   US PELVIC COMPLETE W TRANSVAGINAL with RDUS1   [facility] ([facility])    [address] [place] [address]  [address] [place] [address] [phone]           Please bring a list of your medicines (including vitamins, minerals and over-the-counter drugs). Also, tell your doctor about any allergies you may have. Wear comfortable clothes and leave your valuables at home.  Adults: Drink six 8-ounce glasses of fluid one hour before your exam. Do NOT empty your bladder.  If you need to empty your bladder before your exam, try to release only a little bit of urine. Then, drink another 8oz glass of fluid.  Children: Children who are potty trained should drink at least 4 cups (32 oz) of liquid 45 minutes to one hour prior to the exam. The child s bladder must be full in order to achieve a diagnostic exam. If your child is very uncomfortable or has an urgent need to pee, please notify a technologist; they will try to find out how much longer the wait may be and provide instructions to help relieve the pressure. Occasionally it is medically necessary to insert a urinary catheter to fill the bladder.  Please call the Imaging Department at your exam site with any questions.            Feb 12, 2018  9:30 AM CST   SHORT with Maria Dolores Kincaid MD   [facility] ([facility])    [address] [address]  [facility] [address]   [phone]            Feb 21, 2018   Procedure with Maria Dolores Kincaid MD   [facility] [place] (--)    [address]  [place] [address]   [phone]            Aug 06, 2018  4:00 PM CDT   Return Visit with  Oncology Nurse   [facility] ([facility])    [facility]  [address]  [place] [address]   [phone]            Aug 08, 2018  3:30 PM CDT   Return Visit with Layo Joshi MD   [facility] ([facility])    [facility]  [address]  [place] [address]   [phone]              Future tests that were ordered for you today     Open Future Orders        Priority Expected Expires Ordered    CBC with platelets Routine 8/7/2018 11/7/2018 2/7/2018    Iron and iron binding capacity Routine 8/7/2018 11/7/2018 2/7/2018    Ferritin Routine 8/7/2018 11/7/2018 2/7/2018            Who to contact     If you have questions or need follow up information about today's clinic visit or your schedule please contact [facility] directly at [phone].  Normal or non-critical lab and imaging results will be communicated to you by [employer], letter or phone within 4 business days after the clinic has received the results. If you do not hear from us within 7 days, please contact the clinic through [employer] or phone. If you have a critical or abnormal lab result, we will notify you by phone as soon as possible.  Submit refill requests through [employer] or call your pharmacy and they will forward the refill request to us. Please allow 3 business days for your refill to be completed.          Additional Information About Your Visit        [employer] Information     [employer] gives you secure access to your electronic health record. If you see a primary care provider, you can also send messages to your care team and make  appointments. If you have questions, please call your primary care clinic.  If you do not have a primary care provider, please call [phone] and they will assist you.        Care EveryWhere ID     This is your Care EveryWhere ID. This could be used by other organizations to access your [facility] medical records  [id number]        Your Vitals Were     Pulse Temperature Respirations Last Period Pulse Oximetry BMI (Body Mass Index)    64 98.6  F (37  C) (Oral) 16 01/13/2018 97% 22.78 kg/m2       Blood Pressure from Last 3 Encounters:   02/07/18 118/80   01/30/18 131/87   01/03/18 110/73    Weight from Last 3 Encounters:   02/07/18 67 kg (147 lb 9.6 oz)   01/30/18 67 kg (147 lb 12.8 oz)   12/19/17 70 kg (154 lb 6.4 oz)               Primary Care Provider Office Phone # Fax #    Layo Joshi [phone]       [facility] [address] [address]        Equal Access to Services     [facility] : Hadii gurdeep lopez hadtorrey Sodada, waaxda lujerman, qaybta kaalmaluke courtney, nabila valencia . So [facility] [phone].    ATENCIÓN: Si habla español, tiene a casarez disposición servicios gratuitos de asistencia lingüística. Pam al [phone].    We comply with applicable federal civil rights laws and [place] laws. We do not discriminate on the basis of race, color, national origin, age, disability, sex, sexual orientation, or gender identity.            Thank you!     Thank you for choosing [facility]  for your care. Our goal is always to provide you with excellent care. Hearing back from our patients is one way we can continue to improve our services. Please take a few minutes to complete the written survey that you may receive in the mail after your visit with us. Thank you!             Your Updated Medication List - Protect others around you: Learn how to safely use, store and throw away your medicines at www.disposemymeds.org.           This list is accurate as of 2/7/18  3:43 PM.  Always use your most recent med list.                   Brand Name Dispense Instructions for use Diagnosis    cholecalciferol 01800 UNITS capsule    VITAMIN D3    40 capsule    Take 1 capsule (50,000 Units) by mouth every 14 days SIG: TAKE ONE CAP EVERY OTHER WEEK, INDICATION: TO TREAT VITAMIN D DEFICIENCY (1ST AND 15TH OF EACH MONTH)    Vitamin D deficiency       Cyanocobalamin 5000 MCG/ML Liqd    B-12 SUPER STRENGTH    2 Bottle    Place 1 mL under the tongue daily FOR VITAMIN B12 SUPPLEMENTATION, PLEASE PLACE UNDER THE TONGUE    Fatigue, unspecified type       omega-3 acid ethyl esters 1 G capsule    Lovaza     Take 2 g by mouth 2 times daily        ZOLOFT PO      Take 150 mg by mouth daily

## 2018-02-07 NOTE — LETTER
"    2/7/2018         RE: Judy Argueta  [address]  [facility] [address]        Dear Colleague,    Thank you for referring your patient, Judy Argueta, to the [facility]. Please see a copy of my visit note below.    Oncology Rooming Note    February 7, 2018 3:27 PM   Judy Argueta is a 32 year old female who presents for:    Chief Complaint   Patient presents with     Oncology Clinic Visit     Initial Vitals: /80 (BP Location: Left arm, Patient Position: Sitting, Cuff Size: Adult Regular)  Pulse 64  Temp 98.6  F (37  C) (Oral)  Resp 16  Wt 67 kg (147 lb 9.6 oz)  LMP 01/13/2018  SpO2 97%  BMI 22.78 kg/m2 Estimated body mass index is 22.78 kg/(m^2) as calculated from the following:    Height as of 1/30/18: 1.715 m (5' 7.5\").    Weight as of this encounter: 67 kg (147 lb 9.6 oz). Body surface area is 1.79 meters squared.  No Pain (0) Comment: Data Unavailable   Patient's last menstrual period was 01/13/2018.  Allergies reviewed: Yes  Medications reviewed: Yes    Medications: Medication refills not needed today.  Pharmacy name entered into [facility]:    [facility] [address]  [facility] [address] [facility] [address] [facility] AT [facility] [id number] [place] [address] [address] AT [facility] [address]    Clinical concerns: no     5 minutes for nursing intake (face to face time)          Adilia Dougherty MA                Visit Date:   02/07/2018      SUBJECTIVE:  Ms. Argueta is a 32-year-old female with microcytic anemia secondary to iron deficiency. Her iron deficiency is secondary to menorrhagia.  She has been seen by a gynecologist.  She is going to have a hysteroscopy done on February 21st.       For severe iron deficiency which is not responding to oral iron, the patient was given 2 doses of IV Injectafer.  The patient said that symptomatically she felt better.  Her fatigue improved.  Dizziness, improved.  " She was more energetic.  Muscle cramp all improved.      The patient recently had her menstrual bleed.  The patient said that again she has started to feel weak after that.      REVIEW OF SYSTEMS:  No headache.  No dizziness.  No chest pain, difficulty breathing.  No abdominal pain, nausea or vomiting.  No urinary complaints.      PHYSICAL EXAMINATION:   GENERAL:  She is alert, oriented x 3.   VITAL SIGNS:  Reviewed.   Rest of the system not examined.      LABORATORY DATA:  Reviewed.      ASSESSMENT:   1.  A 32-year-old female with iron deficiency anemia secondary to menorrhagia.   2.  Menorrhagia.   3.  Fatigue and lightheadedness, which improved with IV iron.      PLAN:   1.  Labs were reviewed with the patient.  She was happy to know that her hemoglobin, iron and ferritin are all normal.  She is not iron deficient anymore.   2.  The patient has menorrhagia.  Her gynecologist has recommended IUD.  I encouraged her to consider IUD.  If her menstrual bleeding stops, anemia and iron deficiency should not happen.  She is going to consider IUD.   3.  I told the patient that she can become iron deficient again in the future.  I advised her to follow up in 6 months' time with labs including CBC and iron studies.     4.  The patient was having pain in her left hip and lower back. As per the patient, this is pelvic pain.  Her gynecologist examined her and told her this is pelvic pain. So we are getting some physical therapy.     5.  The patient had a few questions, which were all answered.  I will see her in 6 months.  The patient advised to call our office if she has worsening weakness, dizziness, shortness of breath, muscle cramps or any other concerns.         PAULA BLUNT MD             D: 2018   T: 2018   MT: SOLIS      Name:     DAYTON MULLEN   MRN:      [id number]        Account:      [id number]   :      1985           Visit Date:   2018      Document: [id number]       Again, thank you  for allowing me to participate in the care of your patient.        Sincerely,        Layo Joshi MD

## 2018-02-23 ENCOUNTER — OFFICE VISIT (OUTPATIENT)
Dept: FAMILY MEDICINE | Facility: CLINIC | Age: 33
End: 2018-02-23
Payer: COMMERCIAL

## 2018-02-23 ENCOUNTER — RADIANT APPOINTMENT (OUTPATIENT)
Dept: ULTRASOUND IMAGING | Facility: CLINIC | Age: 33
End: 2018-02-23
Attending: OBSTETRICS & GYNECOLOGY
Payer: COMMERCIAL

## 2018-02-23 ENCOUNTER — OFFICE VISIT (OUTPATIENT)
Dept: OBGYN | Facility: CLINIC | Age: 33
End: 2018-02-23
Attending: OBSTETRICS & GYNECOLOGY
Payer: COMMERCIAL

## 2018-02-23 VITALS
DIASTOLIC BLOOD PRESSURE: 60 MMHG | WEIGHT: 145.3 LBS | SYSTOLIC BLOOD PRESSURE: 118 MMHG | BODY MASS INDEX: 22.42 KG/M2 | OXYGEN SATURATION: 98 % | HEART RATE: 82 BPM

## 2018-02-23 DIAGNOSIS — N83.201 RIGHT OVARIAN CYST: ICD-10-CM

## 2018-02-23 DIAGNOSIS — N92.0 MENORRHAGIA WITH REGULAR CYCLE: ICD-10-CM

## 2018-02-23 DIAGNOSIS — N84.0 ENDOMETRIAL POLYP: ICD-10-CM

## 2018-02-23 DIAGNOSIS — N60.12 FIBROCYSTIC BREAST CHANGES OF BOTH BREASTS: Primary | ICD-10-CM

## 2018-02-23 DIAGNOSIS — Z01.818 PREOP GENERAL PHYSICAL EXAM: Primary | ICD-10-CM

## 2018-02-23 DIAGNOSIS — N92.4 EXCESSIVE BLEEDING IN PREMENOPAUSAL PERIOD: ICD-10-CM

## 2018-02-23 DIAGNOSIS — N83.201 CYST OF RIGHT OVARY: ICD-10-CM

## 2018-02-23 DIAGNOSIS — N60.11 FIBROCYSTIC BREAST CHANGES OF BOTH BREASTS: Primary | ICD-10-CM

## 2018-02-23 PROCEDURE — 99214 OFFICE O/P EST MOD 30 MIN: CPT | Performed by: NURSE PRACTITIONER

## 2018-02-23 PROCEDURE — 76830 TRANSVAGINAL US NON-OB: CPT | Performed by: OBSTETRICS & GYNECOLOGY

## 2018-02-23 PROCEDURE — 99213 OFFICE O/P EST LOW 20 MIN: CPT | Performed by: OBSTETRICS & GYNECOLOGY

## 2018-02-23 NOTE — MR AVS SNAPSHOT
After Visit Summary   2/23/2018    Judy Argueta    MRN: [id number]           Patient Information     Date Of Birth          1985        Visit Information        Provider Department      2/23/2018 2:30 PM Maria Dolores Kincaid MD [facility]        Today's Diagnoses     Fibrocystic breast changes of both breasts    -  1    Endometrial polyp           Follow-ups after your visit        Your next 10 appointments already scheduled     Aug 06, 2018  4:00 PM CDT   Return Visit with  Oncology Nurse   [facility] ([facility])    [facility]  [address]  [place] [address]   [phone]            Aug 08, 2018  3:30 PM CDT   Return Visit with Layo Joshi MD   [facility] ([facility])    [facility]  [address]  [place] [address]   [phone]              Who to contact     If you have questions or need follow up information about today's clinic visit or your schedule please contact [facility] directly at [phone].  Normal or non-critical lab and imaging results will be communicated to you by MyChart, letter or phone within 4 business days after the clinic has received the results. If you do not hear from us within 7 days, please contact the clinic through [employer] or phone. If you have a critical or abnormal lab result, we will notify you by phone as soon as possible.  Submit refill requests through [employer] or call your pharmacy and they will forward the refill request to us. Please allow 3 business days for your refill to be completed.          Additional Information About Your Visit        MyChart Information     SHAPE gives you secure access to your electronic health record. If you see a primary care provider, you can also send messages to your care team and make appointments. If you have questions, please call your primary  care clinic.  If you do not have a primary care provider, please call [phone] and they will assist you.        Care EveryWhere ID     This is your Care EveryWhere ID. This could be used by other organizations to access your [facility] medical records  [id number]        Your Vitals Were     Last Period                   02/11/2018            Blood Pressure from Last 3 Encounters:   02/23/18 (P) 103/64   02/23/18 118/60   02/07/18 118/80    Weight from Last 3 Encounters:   02/23/18 (P) 144 lb 9.6 oz (65.6 kg)   02/23/18 145 lb 4.8 oz (65.9 kg)   02/07/18 147 lb 9.6 oz (67 kg)              Today, you had the following     No orders found for display       Primary Care Provider Office Phone # Fax #    Layo Joshi [phone]       [facility] [address]  [facility] [address]        Equal Access to Services     [facility] : Hadii aad ku hadasho Soomaali, waaxda luqadaha, qaybta kaalmada adeegyada, waxay idiin hayaan radhaeg quintin valencia . So [facility] [phone].    ATENCIÓN: Si habla español, tiene a casarez disposición servicios gratuitos de asistencia lingüística. Llame al [phone].    We comply with applicable federal civil rights laws and [place] laws. We do not discriminate on the basis of race, color, national origin, age, disability, sex, sexual orientation, or gender identity.            Thank you!     Thank you for choosing [facility]  for your care. Our goal is always to provide you with excellent care. Hearing back from our patients is one way we can continue to improve our services. Please take a few minutes to complete the written survey that you may receive in the mail after your visit with us. Thank you!             Your Updated Medication List - Protect others around you: Learn how to safely use, store and throw away your medicines at www.disposemymeds.org.          This list is accurate as of 2/23/18 11:59 PM.  Always use your most recent  med list.                   Brand Name Dispense Instructions for use Diagnosis    cholecalciferol 57963 UNITS capsule    VITAMIN D3    40 capsule    Take 1 capsule (50,000 Units) by mouth every 14 days SIG: TAKE ONE CAP EVERY OTHER WEEK, INDICATION: TO TREAT VITAMIN D DEFICIENCY (1ST AND 15TH OF EACH MONTH)    Vitamin D deficiency       Cyanocobalamin 5000 MCG/ML Liqd    B-12 SUPER STRENGTH    2 Bottle    Place 1 mL under the tongue daily FOR VITAMIN B12 SUPPLEMENTATION, PLEASE PLACE UNDER THE TONGUE    Fatigue, unspecified type       omega-3 acid ethyl esters 1 G capsule    Lovaza     Take 2 g by mouth 2 times daily        ZOLOFT PO      Take 150 mg by mouth daily

## 2018-02-23 NOTE — PROGRESS NOTES
[address] [address]  [phone]  Dept: [phone]    PRE-OP EVALUATION:  Today's date: 2018    Judy Argueta (: 1985) presents for pre-operative evaluation assessment as requested by Dr. Maria Dolores Kincaid.  She requires evaluation and anesthesia risk assessment prior to undergoing OPERATIVE HYSTEROSCOPY WITH MORCELLATOR with polypectomy vs myomectomy surgery/procedure for treatment of endometrial thickening and ovarian cyst and heavy periods  .    Proposed Surgery/ Procedure: Hysteroscopic Myomectomy  Date of Surgery/ Procedure: 3/9/18  Time of Surgery/ Procedure: 12pm  Hospital/Surgical Facility: [facility]  Fax number for surgical facility:   Primary Physician: Layo Joshi  Type of Anesthesia Anticipated: Monitor    Patient has a Health Care Directive or Living Will:  NO    1. NO - Do you have a history of heart attack, stroke, stent, bypass or surgery on an artery in the head, neck, heart or legs?  2. NO - Do you ever have any pain or discomfort in your chest?  3. NO - Do you have a history of  Heart Failure?  4. NO - Are you troubled by shortness of breath when: walking on the level, up a slight hill or at night?  5. NO - Do you currently have a cold, bronchitis or other respiratory infection?  6. NO - Do you have a cough, shortness of breath or wheezing?  7. NO - Do you sometimes get pains in the calves of your legs when you walk?  8. YES - DO YOU OR ANYONE IN YOUR FAMILY HAVE PREVIOUS HISTORY OF BLOOD CLOTS? SELF from a surgery when 19  9. NO - Do you or does anyone in your family have a serious bleeding problem such as prolonged bleeding following surgeries or cuts?  10. YES - HAVE YOU EVER HAD PROBLEMS WITH ANEMIA OR BEEN TOLD TO TAKE IRON PILLS? HAD IRON INFUSIONS IN , IRON IS STABLE RIGHT NOW  11. NO - Have you had any abnormal blood loss such as black, tarry or bloody stools, or abnormal vaginal bleeding?  12. NO -  Have you ever had a blood transfusion?  13. NO - Have you or any of your relatives ever had problems with anesthesia?  14. NO - Do you have sleep apnea, excessive snoring or daytime drowsiness?  15. NO - Do you have any prosthetic heart valves?  16. NO - Do you have prosthetic joints?  17. NO - Is there any chance that you may be pregnant?    anemai she reports as controlled, tired all the time but this is nothing new for her  Checked labs after her treatments and no checking for 6 months       HPI:     HPI related to upcoming procedure:  endometrial thickening and ovarian cyst and heavy periods  .      ANEMIA - Patient has a recent history of moderate severity anemia, which has not been symptomatic. Work up to date has revealed stable recently. Treatment has been last Iron infusion in January.                                                                                                                   .    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  1/30/18 NIL pap, neg HR HPV. Plan routine screening         Vitamin B12 deficiency without anemia 10/02/2014     Priority: Medium     Diagnosis updated by automated process. Provider to review and confirm.       Enlarged lymph nodes 08/07/2014     Priority: Medium     CERVICAL MOSTLY, SINCE AGE 16 YEARS  Problem list name updated by automated process. Provider to review       Fatigue 08/07/2014     Priority: Medium     Vitamin D deficiency 08/07/2014     Priority: Medium      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    HPV #16      Menorrhagia      Past Surgical History:   Procedure Laterality Date     DISSECT LYMPH NODE AXILLA       Current Outpatient Prescriptions   Medication Sig Dispense Refill     omega-3 acid ethyl esters (LOVAZA) 1 G capsule Take 2 g by mouth 2 times daily       cholecalciferol (VITAMIN D3) 59005 UNITS capsule Take 1 capsule (50,000 Units) by mouth every 14 days SIG: TAKE ONE CAP EVERY OTHER WEEK, INDICATION: TO TREAT VITAMIN D DEFICIENCY (1ST AND 15TH OF EACH MONTH) 40 capsule 0     Cyanocobalamin (B-12 SUPER STRENGTH) 5000 MCG/ML LIQD Place 1 mL under the tongue daily FOR VITAMIN B12 SUPPLEMENTATION, PLEASE PLACE UNDER THE TONGUE 2 Bottle PRN     Sertraline HCl (ZOLOFT PO) Take 150 mg by mouth daily        OTC products: Blood builder and womens vitamin, omega 3, b12    Allergies   Allergen Reactions     Codeine      Darvocet [Propoxyphene N-Apap]      Dilaudid [Hydromorphone]      Morphine      Oxycontin [Oxycodone]      Percocet [Oxycodone-Acetaminophen]      Vicodin [Hydrocodone-Acetaminophen]       Latex Allergy: NO    Social History   Substance Use Topics     Smoking status: Never Smoker     Smokeless tobacco: Never Used     Alcohol use Yes      Comment: x1 glass of wine every couple of weeks     History   Drug Use No       REVIEW OF SYSTEMS:   Constitutional, neuro, ENT, endocrine, pulmonary, cardiac, gastrointestinal, genitourinary, musculoskeletal, integument and psychiatric systems are negative, except as otherwise noted.    EXAM:   /60  Pulse 82  Wt 145 lb 4.8 oz (65.9 kg)  SpO2 98%  BMI 22.42 kg/m2    GENERAL APPEARANCE: healthy, alert and no distress     EYES: EOMI, PERRL     HENT: ear canals and TM's normal and nose and mouth without ulcers or lesions     NECK: no adenopathy, no asymmetry, masses, or scars and thyroid normal to palpation     RESP: lungs clear to auscultation - no rales, rhonchi or wheezes     CV: regular rates and rhythm, normal S1 S2, no S3 or S4 and no murmur, click or rub      ABDOMEN:  soft, nontender, no HSM or masses and bowel sounds normal     MS: extremities normal- no gross deformities noted, no evidence of inflammation in joints, FROM in all extremities.     SKIN: no suspicious lesions or rashes     NEURO: Normal strength and tone, sensory exam grossly normal, mentation intact and speech normal     PSYCH: mentation appears normal. and affect normal/bright     LYMPHATICS: No cervical adenopathy    DIAGNOSTICS:   Deferred to ObGyn as today is too early for lab work    Recent Labs   Lab Test  02/05/18   1520  12/07/17   1524   08/07/14   1054  07/02/14   0910   HGB  13.9  11.0*   < >  13.0  12.9   PLT  219  277   < >  282  313   NA   --    --    --   138  145*   POTASSIUM   --    --    --   3.9  3.7   CR   --    --    --   0.74  0.75    < > = values in this interval not displayed.        IMPRESSION:   Reason for surgery/procedure: OPERATIVE HYSTEROSCOPY WITH MORCELLATOR with polypectomy vs myomectomy surgery/procedure for treatment of endometrial thickening and ovarian cyst and heavy periods  .  Diagnosis/reason for consult: pre-op exam     The proposed surgical procedure is considered INTERMEDIATE risk.    REVISED CARDIAC RISK INDEX  The patient has the following serious cardiovascular risks for perioperative complications such as (MI, PE, VFib and 3  AV Block):  No serious cardiac risks  INTERPRETATION: 0 risks: Class I (very low risk - 0.4% complication rate)    The patient has the following additional risks for perioperative complications:  Significant bleeding history and anemia       ICD-10-CM    1. Preop general physical exam Z01.818    2. Excessive bleeding in premenopausal period N92.4    3. Right ovarian cyst N83.201        RECOMMENDATIONS:       Anemia  Anemia and requires lab work prior to surgery       --Patient is to take all scheduled medications on the day of surgery EXCEPT for modifications listed below.    APPROVAL GIVEN to proceed with proposed procedure, will  contact ObGyn to find out what lab work will be needed and timing of prior to surgery       Signed Electronically by: LLOYD Padilla CNP    Copy of this evaluation report is provided to requesting physician.    [facility] Preop Guidelines

## 2018-02-23 NOTE — MR AVS SNAPSHOT
After Visit Summary   2/23/2018    Judy Argueta    MRN: [id number]           Patient Information     Date Of Birth          1985        Visit Information        Provider Department      2/23/2018 1:00 PM Pepper Sullivan APRN [facility]        Today's Diagnoses     Preop general physical exam    -  1    Excessive bleeding in premenopausal period        Right ovarian cyst          Care Instructions      Before Your Surgery      Call your surgeon if there is any change in your health. This includes signs of a cold or flu (such as a sore throat, runny nose, cough, rash or fever).    Do not smoke, drink alcohol or take over the counter medicine (unless your surgeon or primary care doctor tells you to) for the 24 hours before and after surgery.    If you take prescribed drugs: Follow your doctor s orders about which medicines to take and which to stop until after surgery.    Eating and drinking prior to surgery: follow the instructions from your surgeon    Take a shower or bath the night before surgery. Use the soap your surgeon gave you to gently clean your skin. If you do not have soap from your surgeon, use your regular soap. Do not shave or scrub the surgery site.  Wear clean pajamas and have clean sheets on your bed.           Follow-ups after your visit        Your next 10 appointments already scheduled     Mar 09, 2018   Procedure with Maria Dolores Kincaid MD   [facility] [place], Same Day Surgery (--)    [address]  [facility] [address]   [phone]            Aug 06, 2018  4:00 PM CDT   Return Visit with  Oncology Nurse   [facility]    [facility]  [address]  [facility] [address]   [phone]            Aug 08, 2018  3:30 PM CDT   Return Visit with Layo Joshi MD   [facility]    [facility]  [address]  [place] [address] [phone]              Who to contact     If you have questions or need follow up information about today's clinic visit or your schedule please contact [facility] directly at [phone].  Normal or non-critical lab and imaging results will be communicated to you by MyChart, letter or phone within 4 business days after the clinic has received the results. If you do not hear from us within 7 days, please contact the clinic through [facility] or phone. If you have a critical or abnormal lab result, we will notify you by phone as soon as possible.  Submit refill requests through [facility] or call your pharmacy and they will forward the refill request to us. Please allow 3 business days for your refill to be completed.          Additional Information About Your Visit        [facility] Information     [facility] gives you secure access to your electronic health record. If you see a primary care provider, you can also send messages to your care team and make appointments. If you have questions, please call your primary care clinic.  If you do not have a primary care provider, please call [phone] and they will assist you.        Care EveryWhere ID     This is your Care EveryWhere ID. This could be used by other organizations to access your [facility] medical records  [id number]        Your Vitals Were     Pulse Last Period Pulse Oximetry BMI (Body Mass Index)          82 02/11/2018 98% 22.42 kg/m2         Blood Pressure from Last 3 Encounters:   02/23/18 (P) 103/64   02/23/18 118/60   02/07/18 118/80    Weight from Last 3 Encounters:   02/23/18 (P) 144 lb 9.6 oz (65.6 kg)   02/23/18 145 lb 4.8 oz (65.9 kg)   02/07/18 147 lb 9.6 oz (67 kg)              Today, you had the following     No orders found for display       Primary Care Provider Office Phone # Fax #    Layo Joshi [phone]       [facility] [address]   [place] [address]         Equal Access to Services     [facility] : Hadii aad ku hadalexysuzanne Sheldon, wabraydenda baileyadaha, qanubiadiane birdjoannanabila aguirre. So [facility] [phone].    ATENCIÓN: Si habla español, tiene a casarez disposición servicios gratuitos de asistencia lingüística. Natanaelame al [phone].    We comply with applicable federal civil rights laws and [place] laws. We do not discriminate on the basis of race, color, national origin, age, disability, sex, sexual orientation, or gender identity.            Thank you!     Thank you for choosing [facility]  for your care. Our goal is always to provide you with excellent care. Hearing back from our patients is one way we can continue to improve our services. Please take a few minutes to complete the written survey that you may receive in the mail after your visit with us. Thank you!             Your Updated Medication List - Protect others around you: Learn how to safely use, store and throw away your medicines at www.disposemymeds.org.          This list is accurate as of 2/23/18 11:59 PM.  Always use your most recent med list.                   Brand Name Dispense Instructions for use Diagnosis    cholecalciferol 95132 UNITS capsule    VITAMIN D3    40 capsule    Take 1 capsule (50,000 Units) by mouth every 14 days SIG: TAKE ONE CAP EVERY OTHER WEEK, INDICATION: TO TREAT VITAMIN D DEFICIENCY (1ST AND 15TH OF EACH MONTH)    Vitamin D deficiency       Cyanocobalamin 5000 MCG/ML Liqd    B-12 SUPER STRENGTH    2 Bottle    Place 1 mL under the tongue daily FOR VITAMIN B12 SUPPLEMENTATION, PLEASE PLACE UNDER THE TONGUE    Fatigue, unspecified type       omega-3 acid ethyl esters 1 G capsule    Lovaza     Take 2 g by mouth 2 times daily        ZOLOFT PO      Take 150 mg by mouth daily

## 2018-02-23 NOTE — NURSING NOTE
"Chief Complaint   Patient presents with     Ultrasound     review.      Breast Problem     felt a couple small lumps in breast the other day. feel like cysts or lymph nodes possibly. not painful.        Initial There were no vitals taken for this visit. Estimated body mass index is 22.42 kg/(m^2) as calculated from the following:    Height as of 18: 5' 7.5\" (1.715 m).    Weight as of an earlier encounter on 18: 145 lb 4.8 oz (65.9 kg).  BP completed using cuff size: regular        The following HM Due: NONE      The following patient reported/Care Every where data was sent to:  P ABSTRACT QUALITY INITIATIVES [[id number]]        patient has appointment for today    Ursula Larson CMA                "

## 2018-03-22 NOTE — PROGRESS NOTES
"S:  Judy presents for  1. Ultrasound review.  - Prior ultrasound showed possible polyp and ovarian cyst    2. Breast masses.   - Feel like she noticed a couple of small lumps in her breasts a few days ago. Hard to find them now. Not painful.   - Patient's last menstrual period was 02/11/2018.   - Does drink caffine    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    HPV #16     Menorrhagia      Past Surgical History:   Procedure Laterality Date     DISSECT LYMPH NODE AXILLA       O:  Vitals:    02/23/18 1419   BP: (P) 103/64   Pulse: (P) 72   Temp: (P) 98.2  F (36.8  C)   TempSrc: (P) Oral   Weight: (P) 144 lb 9.6 oz (65.6 kg)   Height: (P) 5' 7.5\" (1.715 m)     Gen: well appearing  Breasts: normal without suspicious masses, skin changes or axillary nodes, symmetric fibrous changes in both upper outer quadrants.  Lymph: no cervical or supraclavicular lymphadnopathy.      Nancy Vance on 2/23/2018  2:14 PM      Gynecological Ultrasound Report  Pelvic U/S - Transvaginal  [facility]  Referring Provider: Maria Dolores Kincaid MD  Sonographer:  Nancy Vance RDMS  Indication: polyp in cavity and right ovarian cysts on previous ultrasound elsewhere  LMP: 02/13/2018  History: irregular menses  Gynecological Ultrasonography:   Uterus: anteverted  Size: 9.0 x 4.5 x 3.9 cm  Findings: thick endometrium with appearance of polyp: 2.0 x 1.0 x 1.5 cm with blood flow  Endometrium: Thickness total 15.1 mm      Right Ovary: 4.0 x 2.8 x 2.6 cm with simple cyst:1.8 x 1.3 x 1.8 cm  Left Ovary: 1.9 x 2.3 x 1.8 cm  Cul de Sac/Pouch of Hermann: no free fluid      Impression:  The uterus is anteflexed, normal size.  Within the endometrial cavity (fundal)  is a solid appearing polyp like mass ~1.5 cm in diameter.  (could be endometrial or possibly a fibroid polyp)   The ovaries are normal in size and appearance.  A dominant follicle is present in the right ovary.     Trish Mauricio MD "                 A/P:  32 year old  with   1. Fibrous breasts  - Discussed fibrous breasts on exam  - Discussed reducing caffeine, changes throughout the cycle, benign nature    2. Endometrial polyp  - Plan hysteroscopic polypectomy  - Does not want IUD at this time, considering pregnancy in the near future.

## 2018-03-27 RX ORDER — PHENAZOPYRIDINE HYDROCHLORIDE 200 MG/1
200 TABLET, FILM COATED ORAL ONCE
Status: CANCELLED | OUTPATIENT
Start: 2018-04-25

## 2018-04-16 ENCOUNTER — OFFICE VISIT (OUTPATIENT)
Dept: OBGYN | Facility: CLINIC | Age: 33
End: 2018-04-16
Payer: COMMERCIAL

## 2018-04-16 VITALS
BODY MASS INDEX: 23.15 KG/M2 | DIASTOLIC BLOOD PRESSURE: 73 MMHG | SYSTOLIC BLOOD PRESSURE: 110 MMHG | WEIGHT: 150 LBS | HEART RATE: 70 BPM

## 2018-04-16 DIAGNOSIS — N84.0 ENDOMETRIAL POLYP: ICD-10-CM

## 2018-04-16 DIAGNOSIS — Z01.818 PREOP GENERAL PHYSICAL EXAM: Primary | ICD-10-CM

## 2018-04-16 PROCEDURE — 99213 OFFICE O/P EST LOW 20 MIN: CPT | Performed by: OBSTETRICS & GYNECOLOGY

## 2018-04-16 NOTE — MR AVS SNAPSHOT
After Visit Summary   4/16/2018    Jduy Argueta    MRN: [id number]           Patient Information     Date Of Birth          1985        Visit Information        Provider Department      4/16/2018 3:45 PM Maria Dolores Kincaid MD [facility]        Today's Diagnoses     Preop general physical exam    -  1      Care Instructions      Before Your Surgery      Call your surgeon if there is any change in your health. This includes signs of a cold or flu (such as a sore throat, runny nose, cough, rash or fever).    Do not smoke, drink alcohol or take over the counter medicine (unless your surgeon or primary care doctor tells you to) for the 24 hours before and after surgery.    If you take prescribed drugs: Follow your doctor s orders about which medicines to take and which to stop until after surgery.    Eating and drinking prior to surgery: follow the instructions from your surgeon    Take a shower or bath the night before surgery. Use the soap your surgeon gave you to gently clean your skin. If you do not have soap from your surgeon, use your regular soap. Do not shave or scrub the surgery site.  Wear clean pajamas and have clean sheets on your bed.           Follow-ups after your visit        Your next 10 appointments already scheduled     Apr 25, 2018   Procedure with Maria Dolores Kincaid MD   [facility] [place], Same Day Surgery (--)    [address]  [facility] [address]   [phone]            Aug 06, 2018  4:00 PM CDT   Return Visit with  Oncology Nurse   [facility])    [facility]  [address]  [facility] [address] [phone]            Aug 08, 2018  3:20 PM CDT   Return Visit with Layo Joshi MD   [facility])    [facility]  [address]  [facility] [address]   [phone]              Who to contact     If you  have questions or need follow up information about today's clinic visit or your schedule please contact [facility] directly at [phone].  Normal or non-critical lab and imaging results will be communicated to you by MyChart, letter or phone within 4 business days after the clinic has received the results. If you do not hear from us within 7 days, please contact the clinic through [facility] or phone. If you have a critical or abnormal lab result, we will notify you by phone as soon as possible.  Submit refill requests through [facility] or call your pharmacy and they will forward the refill request to us. Please allow 3 business days for your refill to be completed.          Additional Information About Your Visit        [facility] Information     [facility] gives you secure access to your electronic health record. If you see a primary care provider, you can also send messages to your care team and make appointments. If you have questions, please call your primary care clinic.  If you do not have a primary care provider, please call [phone] and they will assist you.        Care EveryWhere ID     This is your Care EveryWhere ID. This could be used by other organizations to access your [facility] medical records  [id number]        Your Vitals Were     Pulse Last Period BMI (Body Mass Index)             70 04/04/2018 23.15 kg/m2          Blood Pressure from Last 3 Encounters:   04/16/18 110/73   02/23/18 (P) 103/64   02/23/18 118/60    Weight from Last 3 Encounters:   04/16/18 150 lb (68 kg)   02/23/18 (P) 144 lb 9.6 oz (65.6 kg)   02/23/18 145 lb 4.8 oz (65.9 kg)              Today, you had the following     No orders found for display       Primary Care Provider Office Phone # Fax #    Layo Joshi [phone]       [facility] [address] [address]        Equal Access to Services     TIM MARTINES AH: evan Philip, bruno  nabila العراقيgerson valencia ah. Jenna [facility] [phone].    ATENCIÓN: Si catalino alonso, tiene a casarez disposición servicios gratuitos de asistencia lingüística. Pam al [phone].    We comply with applicable federal civil rights laws and [place] laws. We do not discriminate on the basis of race, color, national origin, age, disability, sex, sexual orientation, or gender identity.            Thank you!     Thank you for choosing [facility]  for your care. Our goal is always to provide you with excellent care. Hearing back from our patients is one way we can continue to improve our services. Please take a few minutes to complete the written survey that you may receive in the mail after your visit with us. Thank you!             Your Updated Medication List - Protect others around you: Learn how to safely use, store and throw away your medicines at www.disposemymeds.org.          This list is accurate as of 4/16/18  3:50 PM.  Always use your most recent med list.                   Brand Name Dispense Instructions for use Diagnosis    cholecalciferol 52311 units capsule    VITAMIN D3    40 capsule    Take 1 capsule (50,000 Units) by mouth every 14 days SIG: TAKE ONE CAP EVERY OTHER WEEK, INDICATION: TO TREAT VITAMIN D DEFICIENCY (1ST AND 15TH OF EACH MONTH)    Vitamin D deficiency       Cyanocobalamin 5000 MCG/ML Liqd    B-12 SUPER STRENGTH    2 Bottle    Place 1 mL under the tongue daily FOR VITAMIN B12 SUPPLEMENTATION, PLEASE PLACE UNDER THE TONGUE    Fatigue, unspecified type       omega-3 acid ethyl esters 1 g capsule    Lovaza     Take 2 g by mouth 2 times daily        ZOLOFT PO      Take 150 mg by mouth daily

## 2018-04-16 NOTE — NURSING NOTE
"Chief Complaint   Patient presents with     Pre-Op Exam       Initial /73  Pulse 70  Wt 150 lb (68 kg)  LMP 2018  BMI (P) 23.15 kg/m2 Estimated body mass index is 23.15 kg/(m^2) (pended) as calculated from the following:    Height as of 18: (P) 5' 7.5\" (1.715 m).    Weight as of this encounter: 150 lb (68 kg).  BP completed using cuff size: regular        The following HM Due: NONE      The following patient reported/Care Every where data was sent to:  P ABSTRACT QUALITY INITIATIVES [[id number]]          N/a      Nasima Clarke MA              "

## 2018-04-16 NOTE — PROGRESS NOTES
[address] [address]  [phone]  Dept: [phone]    PRE-OP EVALUATION:  Today's date: 2018    Judy Argueta (: 1985) presents for pre-operative evaluation assessment as requested by myself.  She requires evaluation and anesthesia risk assessment prior to undergoing surgery/procedure for treatment of endometrial polyp .    Proposed Surgery/ Procedure: hysteroscopic polypectomy  Date of Surgery/ Procedure: 18  Time of Surgery/ Procedure: 730  Hospital/Surgical Facility: [facility]  Primary Physician: Layo Joshi  Type of Anesthesia Anticipated: Local with MAC    Patient has a Health Care Directive or Living Will:  NO    1. NO - Do you have a history of heart attack, stroke, stent, bypass or surgery on an artery in the head, neck, heart or legs?  2. NO - Do you ever have any pain or discomfort in your chest?  3. NO - Do you have a history of  Heart Failure?  4. NO - Are you troubled by shortness of breath when: walking on the level, up a slight hill or at night?  5. NO - Do you currently have a cold, bronchitis or other respiratory infection?  6. NO - Do you have a cough, shortness of breath or wheezing?  7. NO - Do you sometimes get pains in the calves of your legs when you walk?  8. NO - Do you or anyone in your family have previous history of blood clots?  9. YES - DO YOU OR DOES ANYONE IN YOUR FAMILY HAVE A SERIOUS BLEEDING PROBLEM SUCH AS PROLONGED BLEEDING FOLLOWING SURGERIES OR CUTS? Sister had bleeding after wisdom  10. YES - HAVE YOU EVER HAD PROBLEMS WITH ANEMIA OR BEEN TOLD TO TAKE IRON PILLS? Yes, has heavy periods  11. YES - HAVE YOU HAD ANY ABNORMAL BLOOD LOSS SUCH AS BLACK, TARRY OR BLOODY STOOLS, OR ABNORMAL VAGINAL BLEEDING? Abnormal vaginal bleeding from polyp, reason for surgery  12. NO - Have you ever had a blood transfusion?  13. NO - Have you or any of your relatives ever had problems with  anesthesia?  14. NO - Do you have sleep apnea, excessive snoring or daytime drowsiness?  15. NO - Do you have any prosthetic heart valves?  16. NO - Do you have prosthetic joints?  17. NO - Is there any chance that you may be pregnant?      HPI:     HPI related to upcoming procedure: heavy periods      ANEMIA - Patient has a recent history of moderate-severe anemia, which has not been symptomatic. Work up to date has revealed iron deficiency. Treatment has been IV iron.                                                                                                                                            .    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  1/30/18 NIL pap, neg HR HPV. Plan routine screening         Vitamin B12 deficiency without anemia 10/02/2014     Priority: Medium     Diagnosis updated by automated process. Provider to review and confirm.       Enlarged lymph nodes 08/07/2014     Priority: Medium     CERVICAL MOSTLY, SINCE AGE 16 YEARS  Problem list name updated by automated process. Provider to review       Fatigue 08/07/2014     Priority: Medium     Vitamin D deficiency 08/07/2014     Priority: Medium      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    HPV #16     Menorrhagia      Past Surgical History:   Procedure Laterality Date     DISSECT LYMPH NODE AXILLA       Current Outpatient Prescriptions   Medication Sig Dispense Refill     omega-3  acid ethyl esters (LOVAZA) 1 G capsule Take 2 g by mouth 2 times daily       cholecalciferol (VITAMIN D3) 24225 UNITS capsule Take 1 capsule (50,000 Units) by mouth every 14 days SIG: TAKE ONE CAP EVERY OTHER WEEK, INDICATION: TO TREAT VITAMIN D DEFICIENCY (1ST AND 15TH OF EACH MONTH) 40 capsule 0     Cyanocobalamin (B-12 SUPER STRENGTH) 5000 MCG/ML LIQD Place 1 mL under the tongue daily FOR VITAMIN B12 SUPPLEMENTATION, PLEASE PLACE UNDER THE TONGUE 2 Bottle PRN     Sertraline HCl (ZOLOFT PO) Take 150 mg by mouth daily        OTC products: None, except as noted above    Allergies   Allergen Reactions     Codeine      Darvocet [Propoxyphene N-Apap]      Dilaudid [Hydromorphone]      Morphine      Oxycontin [Oxycodone]      Percocet [Oxycodone-Acetaminophen]      Vicodin [Hydrocodone-Acetaminophen]       Latex Allergy: NO    Social History   Substance Use Topics     Smoking status: Never Smoker     Smokeless tobacco: Never Used     Alcohol use Yes      Comment: x1 glass of wine every couple of weeks     History   Drug Use No       REVIEW OF SYSTEMS:   Constitutional, neuro, ENT, endocrine, pulmonary, cardiac, gastrointestinal, genitourinary, musculoskeletal, integument and psychiatric systems are negative, except as otherwise noted.    EXAM:   /73  Pulse 70  Wt 150 lb (68 kg)  LMP 04/04/2018  BMI (P) 23.15 kg/m2    GENERAL APPEARANCE: healthy, alert and no distress     EYES: EOMI, PERRL     HENT: ear canals and TM's normal and nose and mouth without ulcers or lesions     NECK: no adenopathy, no asymmetry, masses, or scars and thyroid normal to palpation     RESP: lungs clear to auscultation - no rales, rhonchi or wheezes     CV: regular rates and rhythm, normal S1 S2, no S3 or S4 and no murmur, click or rub     ABDOMEN:  soft, nontender, no HSM or masses and bowel sounds normal     MS: extremities normal- no gross deformities noted, no evidence of inflammation in joints, FROM in all extremities.     SKIN: no  suspicious lesions or rashes     NEURO: Normal strength and tone, sensory exam grossly normal, mentation intact and speech normal     PSYCH: mentation appears normal. and affect normal/bright     LYMPHATICS: No cervical adenopathy    DIAGNOSTICS:   EKG: Not indicated due to non-vascular surgery and low risk of event (age <65 and without cardiac risk factors)    Recent Labs   Lab Test  02/05/18   1520  12/07/17   1524   08/07/14   1054  07/02/14   0910   HGB  13.9  11.0*   < >  13.0  12.9   PLT  219  277   < >  282  313   NA   --    --    --   138  145*   POTASSIUM   --    --    --   3.9  3.7   CR   --    --    --   0.74  0.75    < > = values in this interval not displayed.        IMPRESSION:   Reason for surgery/procedure: endometrial polyp  Diagnosis/reason for consult: abnormal uterine bleeding.    The proposed surgical procedure is considered LOW risk.    REVISED CARDIAC RISK INDEX  The patient has the following serious cardiovascular risks for perioperative complications such as (MI, PE, VFib and 3  AV Block):  No serious cardiac risks  INTERPRETATION: 0 risks: Class I (very low risk - 0.4% complication rate)    The patient has the following additional risks for perioperative complications:  No identified additional risks      ICD-10-CM    1. Preop general physical exam Z01.818        RECOMMENDATIONS:       Anemia  History of anemia and does not require treatment prior to surgery.  Monitor Hemoglobin postoperatively.      --Patient is to take all scheduled medications on the day of surgery EXCEPT for modifications listed below.    APPROVAL GIVEN to proceed with proposed procedure, without further diagnostic evaluation       Signed Electronically by: Maria Dolores Kincaid MD    Copy of this evaluation report is provided to requesting physician.    [facility] Preop Guidelines    Revised Cardiac Risk Index

## 2018-04-23 ENCOUNTER — TELEPHONE (OUTPATIENT)
Dept: OBGYN | Facility: CLINIC | Age: 33
End: 2018-04-23

## 2018-04-23 NOTE — TELEPHONE ENCOUNTER
TC to patient. Pt has results and will scan in to [facility]. Pt states that [facility] is sending urine off for a culture. Right now has a little bit of a cold and sore throat - also wanted you to know this. Once the [facility] message comes through, will send your way.   Yashira Aviles RN-BSN

## 2018-04-23 NOTE — TELEPHONE ENCOUNTER
Yes, still ok for surgery.  Does she have results from [facility]? I can look at them and see if she really needs abx and if she needs a urine culture.

## 2018-04-23 NOTE — TELEPHONE ENCOUNTER
Patient was diagnosed with UTI and was given Cipro at [facility] at [facility]. Pt is scheduled on Wed 4/25 for surgery. Wants to make sure she will still be able to have the surgery. Has not taken the first dose of medication yet. Please advise. Thanks.   Yashira Aviles, RN-BSN

## 2018-04-25 ENCOUNTER — HOSPITAL ENCOUNTER (OUTPATIENT)
Facility: CLINIC | Age: 33
Discharge: HOME | End: 2018-04-25
Attending: OBSTETRICS & GYNECOLOGY | Admitting: OBSTETRICS & GYNECOLOGY
Payer: COMMERCIAL

## 2018-04-25 ENCOUNTER — SURGERY (OUTPATIENT)
Age: 33
End: 2018-04-25

## 2018-04-25 ENCOUNTER — ANESTHESIA (OUTPATIENT)
Dept: SURGERY | Facility: CLINIC | Age: 33
End: 2018-04-25
Payer: COMMERCIAL

## 2018-04-25 ENCOUNTER — ANESTHESIA EVENT (OUTPATIENT)
Dept: SURGERY | Facility: CLINIC | Age: 33
End: 2018-04-25
Payer: COMMERCIAL

## 2018-04-25 VITALS
HEART RATE: 74 BPM | WEIGHT: 151.9 LBS | DIASTOLIC BLOOD PRESSURE: 73 MMHG | SYSTOLIC BLOOD PRESSURE: 110 MMHG | TEMPERATURE: 97.9 F | HEIGHT: 68 IN | RESPIRATION RATE: 15 BRPM | OXYGEN SATURATION: 100 % | BODY MASS INDEX: 23.02 KG/M2

## 2018-04-25 LAB
ABO + RH BLD: NORMAL
ABO + RH BLD: NORMAL
BLD GP AB SCN SERPL QL: NORMAL
BLOOD BANK CMNT PATIENT-IMP: NORMAL
GLUCOSE SERPL-MCNC: 88 MG/DL (ref 70–99)
HCG UR QL: NEGATIVE
SPECIMEN EXP DATE BLD: NORMAL

## 2018-04-25 PROCEDURE — 25000125 ZZHC RX 250: Performed by: OBSTETRICS & GYNECOLOGY

## 2018-04-25 PROCEDURE — 36000059 ZZH SURGERY LEVEL 3 EA 15 ADDTL MIN UMMC: Performed by: OBSTETRICS & GYNECOLOGY

## 2018-04-25 PROCEDURE — 58558 HYSTEROSCOPY BIOPSY: CPT | Mod: GC | Performed by: OBSTETRICS & GYNECOLOGY

## 2018-04-25 PROCEDURE — 71000027 ZZH RECOVERY PHASE 2 EACH 15 MINS: Performed by: OBSTETRICS & GYNECOLOGY

## 2018-04-25 PROCEDURE — 81025 URINE PREGNANCY TEST: CPT | Performed by: OBSTETRICS & GYNECOLOGY

## 2018-04-25 PROCEDURE — 86901 BLOOD TYPING SEROLOGIC RH(D): CPT | Performed by: OBSTETRICS & GYNECOLOGY

## 2018-04-25 PROCEDURE — 25000128 H RX IP 250 OP 636: Performed by: ANESTHESIOLOGY

## 2018-04-25 PROCEDURE — 37000009 ZZH ANESTHESIA TECHNICAL FEE, EACH ADDTL 15 MIN: Performed by: OBSTETRICS & GYNECOLOGY

## 2018-04-25 PROCEDURE — 36000057 ZZH SURGERY LEVEL 3 1ST 30 MIN - UMMC: Performed by: OBSTETRICS & GYNECOLOGY

## 2018-04-25 PROCEDURE — 40000170 ZZH STATISTIC PRE-PROCEDURE ASSESSMENT II: Performed by: OBSTETRICS & GYNECOLOGY

## 2018-04-25 PROCEDURE — 27210794 ZZH OR GENERAL SUPPLY STERILE: Performed by: OBSTETRICS & GYNECOLOGY

## 2018-04-25 PROCEDURE — 25000128 H RX IP 250 OP 636: Performed by: NURSE ANESTHETIST, CERTIFIED REGISTERED

## 2018-04-25 PROCEDURE — 86900 BLOOD TYPING SEROLOGIC ABO: CPT | Performed by: OBSTETRICS & GYNECOLOGY

## 2018-04-25 PROCEDURE — 86850 RBC ANTIBODY SCREEN: CPT | Performed by: OBSTETRICS & GYNECOLOGY

## 2018-04-25 PROCEDURE — 88305 TISSUE EXAM BY PATHOLOGIST: CPT | Performed by: OBSTETRICS & GYNECOLOGY

## 2018-04-25 PROCEDURE — 82947 ASSAY GLUCOSE BLOOD QUANT: CPT | Performed by: ANESTHESIOLOGY

## 2018-04-25 PROCEDURE — 37000008 ZZH ANESTHESIA TECHNICAL FEE, 1ST 30 MIN: Performed by: OBSTETRICS & GYNECOLOGY

## 2018-04-25 PROCEDURE — 88305 TISSUE EXAM BY PATHOLOGIST: CPT | Mod: 26 | Performed by: OBSTETRICS & GYNECOLOGY

## 2018-04-25 PROCEDURE — 25000125 ZZHC RX 250: Performed by: NURSE ANESTHETIST, CERTIFIED REGISTERED

## 2018-04-25 RX ORDER — SODIUM CHLORIDE, SODIUM LACTATE, POTASSIUM CHLORIDE, CALCIUM CHLORIDE 600; 310; 30; 20 MG/100ML; MG/100ML; MG/100ML; MG/100ML
INJECTION, SOLUTION INTRAVENOUS CONTINUOUS
Status: DISCONTINUED | OUTPATIENT
Start: 2018-04-25 | End: 2018-04-25 | Stop reason: HOSPADM

## 2018-04-25 RX ORDER — FENTANYL CITRATE 50 UG/ML
25-50 INJECTION, SOLUTION INTRAMUSCULAR; INTRAVENOUS EVERY 5 MIN PRN
Status: DISCONTINUED | OUTPATIENT
Start: 2018-04-25 | End: 2018-04-25 | Stop reason: HOSPADM

## 2018-04-25 RX ORDER — LIDOCAINE 40 MG/G
CREAM TOPICAL
Status: DISCONTINUED | OUTPATIENT
Start: 2018-04-25 | End: 2018-04-25 | Stop reason: HOSPADM

## 2018-04-25 RX ORDER — LIDOCAINE HYDROCHLORIDE 20 MG/ML
INJECTION, SOLUTION INFILTRATION; PERINEURAL PRN
Status: DISCONTINUED | OUTPATIENT
Start: 2018-04-25 | End: 2018-04-25

## 2018-04-25 RX ORDER — HYDRALAZINE HYDROCHLORIDE 20 MG/ML
2.5-5 INJECTION INTRAMUSCULAR; INTRAVENOUS EVERY 10 MIN PRN
Status: DISCONTINUED | OUTPATIENT
Start: 2018-04-25 | End: 2018-04-25 | Stop reason: HOSPADM

## 2018-04-25 RX ORDER — NALOXONE HYDROCHLORIDE 0.4 MG/ML
.1-.4 INJECTION, SOLUTION INTRAMUSCULAR; INTRAVENOUS; SUBCUTANEOUS
Status: DISCONTINUED | OUTPATIENT
Start: 2018-04-25 | End: 2018-04-25 | Stop reason: HOSPADM

## 2018-04-25 RX ORDER — FENTANYL CITRATE 50 UG/ML
INJECTION, SOLUTION INTRAMUSCULAR; INTRAVENOUS PRN
Status: DISCONTINUED | OUTPATIENT
Start: 2018-04-25 | End: 2018-04-25

## 2018-04-25 RX ORDER — KETOROLAC TROMETHAMINE 30 MG/ML
INJECTION, SOLUTION INTRAMUSCULAR; INTRAVENOUS PRN
Status: DISCONTINUED | OUTPATIENT
Start: 2018-04-25 | End: 2018-04-25

## 2018-04-25 RX ORDER — ONDANSETRON 2 MG/ML
4 INJECTION INTRAMUSCULAR; INTRAVENOUS EVERY 30 MIN PRN
Status: DISCONTINUED | OUTPATIENT
Start: 2018-04-25 | End: 2018-04-25 | Stop reason: HOSPADM

## 2018-04-25 RX ORDER — DIMENHYDRINATE 50 MG/ML
12.5 INJECTION, SOLUTION INTRAMUSCULAR; INTRAVENOUS EVERY 10 MIN PRN
Status: DISCONTINUED | OUTPATIENT
Start: 2018-04-25 | End: 2018-04-25 | Stop reason: CLARIF

## 2018-04-25 RX ORDER — SODIUM CHLORIDE, SODIUM LACTATE, POTASSIUM CHLORIDE, CALCIUM CHLORIDE 600; 310; 30; 20 MG/100ML; MG/100ML; MG/100ML; MG/100ML
INJECTION, SOLUTION INTRAVENOUS CONTINUOUS PRN
Status: DISCONTINUED | OUTPATIENT
Start: 2018-04-25 | End: 2018-04-25

## 2018-04-25 RX ORDER — PROPOFOL 10 MG/ML
INJECTION, EMULSION INTRAVENOUS CONTINUOUS PRN
Status: DISCONTINUED | OUTPATIENT
Start: 2018-04-25 | End: 2018-04-25

## 2018-04-25 RX ORDER — FENTANYL CITRATE 50 UG/ML
25-50 INJECTION, SOLUTION INTRAMUSCULAR; INTRAVENOUS
Status: DISCONTINUED | OUTPATIENT
Start: 2018-04-25 | End: 2018-04-25 | Stop reason: HOSPADM

## 2018-04-25 RX ORDER — ONDANSETRON 4 MG/1
4 TABLET, ORALLY DISINTEGRATING ORAL EVERY 30 MIN PRN
Status: DISCONTINUED | OUTPATIENT
Start: 2018-04-25 | End: 2018-04-25 | Stop reason: HOSPADM

## 2018-04-25 RX ORDER — ONDANSETRON 2 MG/ML
INJECTION INTRAMUSCULAR; INTRAVENOUS PRN
Status: DISCONTINUED | OUTPATIENT
Start: 2018-04-25 | End: 2018-04-25

## 2018-04-25 RX ORDER — ONDANSETRON 4 MG/1
4 TABLET, ORALLY DISINTEGRATING ORAL
Status: DISCONTINUED | OUTPATIENT
Start: 2018-04-25 | End: 2018-04-25 | Stop reason: HOSPADM

## 2018-04-25 RX ORDER — IBUPROFEN 600 MG/1
600 TABLET, FILM COATED ORAL
Status: DISCONTINUED | OUTPATIENT
Start: 2018-04-25 | End: 2018-04-25 | Stop reason: HOSPADM

## 2018-04-25 RX ORDER — PROPOFOL 10 MG/ML
INJECTION, EMULSION INTRAVENOUS PRN
Status: DISCONTINUED | OUTPATIENT
Start: 2018-04-25 | End: 2018-04-25

## 2018-04-25 RX ORDER — MEPERIDINE HYDROCHLORIDE 25 MG/ML
12.5 INJECTION INTRAMUSCULAR; INTRAVENOUS; SUBCUTANEOUS
Status: DISCONTINUED | OUTPATIENT
Start: 2018-04-25 | End: 2018-04-25 | Stop reason: HOSPADM

## 2018-04-25 RX ORDER — DIPHENHYDRAMINE HYDROCHLORIDE 50 MG/ML
25 INJECTION INTRAMUSCULAR; INTRAVENOUS EVERY 6 HOURS PRN
Status: DISCONTINUED | OUTPATIENT
Start: 2018-04-25 | End: 2018-04-25 | Stop reason: HOSPADM

## 2018-04-25 RX ORDER — DIPHENHYDRAMINE HYDROCHLORIDE 50 MG/ML
12.5 INJECTION INTRAMUSCULAR; INTRAVENOUS EVERY 10 MIN PRN
Status: DISCONTINUED | OUTPATIENT
Start: 2018-04-25 | End: 2018-04-25 | Stop reason: HOSPADM

## 2018-04-25 RX ORDER — HYDROXYZINE HYDROCHLORIDE 25 MG/1
25 TABLET, FILM COATED ORAL
Status: DISCONTINUED | OUTPATIENT
Start: 2018-04-25 | End: 2018-04-25 | Stop reason: HOSPADM

## 2018-04-25 RX ADMIN — ONDANSETRON 4 MG: 2 INJECTION INTRAMUSCULAR; INTRAVENOUS at 08:04

## 2018-04-25 RX ADMIN — PROPOFOL 20 MG: 10 INJECTION, EMULSION INTRAVENOUS at 07:42

## 2018-04-25 RX ADMIN — LIDOCAINE HYDROCHLORIDE 80 MG: 20 INJECTION, SOLUTION INFILTRATION; PERINEURAL at 07:27

## 2018-04-25 RX ADMIN — MIDAZOLAM 2 MG: 1 INJECTION INTRAMUSCULAR; INTRAVENOUS at 07:24

## 2018-04-25 RX ADMIN — PROPOFOL 30 MG: 10 INJECTION, EMULSION INTRAVENOUS at 07:27

## 2018-04-25 RX ADMIN — FENTANYL CITRATE 50 MCG: 50 INJECTION, SOLUTION INTRAMUSCULAR; INTRAVENOUS at 08:22

## 2018-04-25 RX ADMIN — PROPOFOL 30 MG: 10 INJECTION, EMULSION INTRAVENOUS at 07:29

## 2018-04-25 RX ADMIN — FENTANYL CITRATE 50 MCG: 50 INJECTION, SOLUTION INTRAMUSCULAR; INTRAVENOUS at 07:24

## 2018-04-25 RX ADMIN — LIDOCAINE HYDROCHLORIDE 10 ML: 10 INJECTION, SOLUTION EPIDURAL; INFILTRATION; INTRACAUDAL; PERINEURAL at 07:55

## 2018-04-25 RX ADMIN — SILVER NITRATE APPLICATORS 2 APPLICATOR: 25; 75 STICK TOPICAL at 08:10

## 2018-04-25 RX ADMIN — FENTANYL CITRATE 50 MCG: 50 INJECTION, SOLUTION INTRAMUSCULAR; INTRAVENOUS at 07:30

## 2018-04-25 RX ADMIN — KETOROLAC TROMETHAMINE 30 MG: 30 INJECTION, SOLUTION INTRAMUSCULAR at 08:04

## 2018-04-25 RX ADMIN — SODIUM CHLORIDE, POTASSIUM CHLORIDE, SODIUM LACTATE AND CALCIUM CHLORIDE: 600; 310; 30; 20 INJECTION, SOLUTION INTRAVENOUS at 07:21

## 2018-04-25 RX ADMIN — PROPOFOL 20 MG: 10 INJECTION, EMULSION INTRAVENOUS at 07:38

## 2018-04-25 RX ADMIN — DIPHENHYDRAMINE HYDROCHLORIDE 25 MG: 50 INJECTION, SOLUTION INTRAMUSCULAR; INTRAVENOUS at 09:11

## 2018-04-25 RX ADMIN — DIPHENHYDRAMINE HYDROCHLORIDE 12.5 MG: 50 INJECTION, SOLUTION INTRAMUSCULAR; INTRAVENOUS at 08:40

## 2018-04-25 RX ADMIN — PROPOFOL 150 MCG/KG/MIN: 10 INJECTION, EMULSION INTRAVENOUS at 07:27

## 2018-04-25 RX ADMIN — FENTANYL CITRATE 50 MCG: 50 INJECTION, SOLUTION INTRAMUSCULAR; INTRAVENOUS at 08:18

## 2018-04-25 RX ADMIN — DIPHENHYDRAMINE HYDROCHLORIDE 12.5 MG: 50 INJECTION, SOLUTION INTRAMUSCULAR; INTRAVENOUS at 08:50

## 2018-04-25 NOTE — ANESTHESIA PREPROCEDURE EVALUATION
Anesthesia Evaluation     .             ROS/MED HX    ENT/Pulmonary:  - neg pulmonary ROS     Neurologic:  - neg neurologic ROS     Cardiovascular:  - neg cardiovascular ROS       METS/Exercise Tolerance:     Hematologic:     (+) Anemia, Other Hematologic Disorder-iron defienciency anemia, Hgb corrected with iron inj, last Hgb 13 on 2/5/17      Musculoskeletal:  - neg musculoskeletal ROS       GI/Hepatic:  - neg GI/hepatic ROS       Renal/Genitourinary:  - ROS Renal section negative       Endo:  - neg endo ROS       Psychiatric:  - neg psychiatric ROS       Infectious Disease:  - neg infectious disease ROS       Malignancy:         Other: Comment: H/O enlarged lymph nodes, s/p multiple biopsies - all benign    (+) No chance of pregnancy                    Physical Exam  Normal systems: dental    Airway   Mallampati: II  TM distance: >3 FB  Neck ROM: full    Dental     Cardiovascular   Rhythm and rate: regular and normal      Pulmonary    breath sounds clear to auscultation                    Anesthesia Plan      History & Physical Review  History and physical reviewed and following examination; no interval change.    ASA Status:  2 .    NPO Status:  > 8 hours    Plan for MAC with Intravenous induction. Maintenance will be TIVA.  Reason for MAC:  Deep or markedly invasive procedure (G8)  PONV prophylaxis:  Ondansetron (or other 5HT-3)       Postoperative Care  Postoperative pain management:  IV analgesics and Oral pain medications.      Consents  Anesthetic plan, risks, benefits and alternatives discussed with:  Patient..                          .

## 2018-04-25 NOTE — ANESTHESIA CARE TRANSFER NOTE
Patient: Judy Argueta    Procedure(s):  Hysteroscopic Myomectomy  - Wound Class: II-Clean Contaminated    Diagnosis: Intrauterine Mass  Diagnosis Additional Information: No value filed.    Anesthesia Type:   MAC     Note:  Airway :Face Mask  Patient transferred to:Phase II  Handoff Report: Identifed the Patient, Identified the Reponsible Provider, Reviewed the pertinent medical history, Discussed the surgical course, Reviewed Intra-OP anesthesia mangement and issues during anesthesia, Set expectations for post-procedure period and Allowed opportunity for questions and acknowledgement of understanding      Vitals: (Last set prior to Anesthesia Care Transfer)    CRNA VITALS  4/25/2018 0748 - 4/25/2018 0825      4/25/2018             Pulse: 90    SpO2: 100 %                Electronically Signed By: LLOYD Banuelos CRNA  April 25, 2018  8:25 AM

## 2018-04-25 NOTE — ANESTHESIA POSTPROCEDURE EVALUATION
Patient: Judy Argueta    Procedure(s):  Hysteroscopic Myomectomy  - Wound Class: II-Clean Contaminated    Diagnosis:Intrauterine Mass  Diagnosis Additional Information: No value filed.    Anesthesia Type:  MAC    Note:  Anesthesia Post Evaluation    Patient location during evaluation: PACU  Patient participation: Able to fully participate in evaluation  Level of consciousness: sleepy but conscious and responsive to verbal stimuli  Pain management: adequate  Airway patency: patent  Cardiovascular status: acceptable  Respiratory status: acceptable  Hydration status: acceptable     Anesthetic complications: None          Last vitals:  Vitals:    04/25/18 0845 04/25/18 0900 04/25/18 0915   BP:      Pulse:      Resp: 14 14 15   Temp:      SpO2:            Electronically Signed By: Uyen Hearn MD  April 25, 2018  9:38 AM

## 2018-04-25 NOTE — IP AVS SNAPSHOT
MAIN OR    [address]    [place] [address]    Phone:  [phone]                                       After Visit Summary   4/25/2018    Judy Argueta    MRN: [id number]           After Visit Summary Signature Page     I have received my discharge instructions, and my questions have been answered. I have discussed any challenges I see with this plan with the nurse or doctor.    ..........................................................................................................................................  Patient/Patient Representative Signature      ..........................................................................................................................................  Patient Representative Print Name and Relationship to Patient    ..................................................               ................................................  Date                                            Time    ..........................................................................................................................................  Reviewed by Signature/Title    ...................................................              ..............................................  Date                                                            Time

## 2018-04-25 NOTE — IP AVS SNAPSHOT
MRN:[id number]                      After Visit Summary   4/25/2018    Judy Argueta    MRN: [id number]           Thank you!     Thank you for choosing [facility] for your care. Our goal is always to provide you with excellent care. Hearing back from our patients is one way we can continue to improve our services. Please take a few minutes to complete the written survey that you may receive in the mail after you visit with us. Thank you!        Patient Information     Date Of Birth          1985        About your hospital stay     You were admitted on:  April 25, 2018 You last received care in the:  [facility] OR    You were discharged on:  April 25, 2018       Who to Call     For medical emergencies, please call 911.  For non-urgent questions about your medical care, please call your primary care provider or clinic, [phone]  For questions related to your surgery, please call your surgery clinic        Attending Provider     Provider Specialty    Maria Dolores Kincaid MD OB/Gyn       Primary Care Provider Office Phone # Fax #    Layo Joshi [phone]      After Care Instructions     Discharge Instructions       Resume pre procedure diet            Discharge Instructions       Pelvic Rest. No tampons, douching or intercourse for  1  weeks.            Discharge Instructions       You may take 400-600mg ibuprofen every 6 hours for pain control. You may have some dark red or brown vaginal bleeding or discharge in the next few days. If you have vaginal bleeding that soaks 2 large pads in 1 hour for 2 hours you should return to Emergency Room for evaluation. If you have any concerns set up clinic appointment with Dr. Kincaid, she will inform you of the pathology results from surgery today.            Ice to affected area       PRN as tolerated            No alcohol       NO ALCOHOL for 24 hours post procedure            No driving or operating machinery       No driving or  operating machinery until day after procedure            Shower        Shower on Post-op day  0.   DO NOT take a bath                  Your next 10 appointments already scheduled     Aug 06, 2018  4:00 PM CDT   Return Visit with  Oncology Nurse   [facility]    [facility] [place]  [place] [address]   [phone]            Aug 08, 2018  3:20 PM CDT   Return Visit with Layo Joshi MD   [facility])    [facility] [place]  [place] [address]   [phone]              Further instructions from your care team       What happens after hysteroscopy?  You may have cramps and bleeding for 24 hours after the procedure. This is normal. Use pads instead of tampons.  Do not douche or use tampons until your health care provider says it s OK.  Do not use any vaginal medicines until you are told it s OK.  Ask your health care provider when it s OK to have sex again.  When should I call my health care provider?  Call your health care provider if you have:  Heavy bleeding (more than 1 pad an hour for 2 or more hours)  A fever over 100.4 F (38.0 C)  Increasing abdominal pain or tenderness  Foul-smelling discharge  Follow-up care  Schedule a follow-up visit with your health care provider. Based on the results of your test, you may need more treatment. Be sure to follow instructions and keep your appointments.      [id number] [employer]. [address] [place] [address], [place] [address]. All rights reserved. This information is not intended as a substitute for professional medical care. Always follow your healthcare professional's instructions.   Same-Day Surgery   Adult Discharge Orders & Instructions     For 24 hours after surgery:  1. Get plenty of rest.  A responsible adult must stay with you for at least 24 hours after you leave the hospital.   2. Pain  medication can slow your reflexes. Do not drive or use heavy equipment.  If you have weakness or tingling, don't drive or use heavy equipment until this feeling goes away.  3. Mixing alcohol and pain medication can cause dizziness and slow your breathing. It can even be fatal. Do not drink alcohol while taking pain medication.  4. Avoid strenuous or risky activities.  Ask for help when climbing stairs.   5. You may feel lightheaded.  If so, sit for a few minutes before standing.  Have someone help you get up.   6. If you have nausea (feel sick to your stomach), drink only clear liquids such as apple juice, ginger ale, broth or 7-Up.  Rest may also help.  Be sure to drink enough fluids.  Move to a regular diet as you feel able. Take pain medications with a small amount of solid food, such as toast or crackers, to avoid nausea.   7. A slight fever is normal. Call the doctor if your fever is over 100 F (37.7 C) (taken under the tongue) or lasts longer than 24 hours.  8. You may have a dry mouth, muscle aches, trouble sleeping or a sore throat.  These symptoms should go away after 24 hours.  9. Do not make important or legal decisions.   Pain Management:      1. Take pain medication (if prescribed) for pain as directed by your physician.        2. WARNING: If the pain medication you have been prescribed contains Tylenol  (acetaminophen), DO NOT take additional doses of Tylenol (acetaminophen).     Call your doctor for any of the followin.  Signs of infection (fever, growing tenderness at the surgery site, severe pain, a large amount of drainage or bleeding, foul-smelling drainage, redness, swelling).    2.  It has been over 8 to 10 hours since surgery and you are still not able to urinate (pee).    3.  Headache for over 24 hours.    4.  Numbness, tingling or weakness the day after surgery (if you had spinal anesthesia).  To contact a doctor, call _____________________________________ or:      [phone] and ask  "for the Resident On Call for:          __________________________________________ (answered 24 hours a day)      Emergency Department:  [facility]: [phone]  [place] [facility]: [phone]               Rev. 10/2014       Pending Results     Date and Time Order Name Status Description    4/25/2018 0805 Surgical pathology exam In process             Admission Information     Date & Time Provider Department Dept. Phone    4/25/2018 Maria Dolores Kincaid MD [facility] [phone]      Your Vitals Were     Blood Pressure Pulse Temperature Respirations Height Weight    119/86 74 97.9  F (36.6  C) (Axillary) 15 1.727 m (5' 8\") 68.9 kg (151 lb 14.4 oz)    Last Period Pulse Oximetry BMI (Body Mass Index)             04/04/2018 100% 23.1 kg/m2         [employer] Information     [employer] gives you secure access to your electronic health record. If you see a primary care provider, you can also send messages to your care team and make appointments. If you have questions, please call your primary care clinic.  If you do not have a primary care provider, please call [phone] and they will assist you.        Care EveryWhere ID     This is your Care EveryWhere ID. This could be used by other organizations to access your [facility] medical records  [id number]        Equal Access to Services     TIM MARTINES : Hadii gurdeep [place], waaxda luqadaha, qaybta kaalmada sherwin, nabila nicole. So [facility] [phone].    ATENCIÓN: Si habla español, tiene a casarez disposición servicios gratuitos de asistencia lingüística. Llame al [phone].    We comply with applicable federal civil rights laws and [place] laws. We do not discriminate on the basis of race, color, national origin, age, disability, sex, sexual orientation, or gender identity.               Review of your medicines      CONTINUE these medicines which have NOT CHANGED        Dose / Directions    " cholecalciferol 10709 units capsule   Commonly known as:  VITAMIN D3   Used for:  Vitamin D deficiency        Dose:  1 capsule   Take 1 capsule (50,000 Units) by mouth every 14 days SIG: TAKE ONE CAP EVERY OTHER WEEK, INDICATION: TO TREAT VITAMIN D DEFICIENCY (1ST AND 15TH OF EACH MONTH)   Quantity:  40 capsule   Refills:  0       Cyanocobalamin 5000 MCG/ML Liqd   Commonly known as:  B-12 SUPER STRENGTH   Used for:  Fatigue, unspecified type        Dose:  1 mL   Place 1 mL under the tongue daily FOR VITAMIN B12 SUPPLEMENTATION, PLEASE PLACE UNDER THE TONGUE   Quantity:  2 Bottle   Refills:  PRN       omega-3 acid ethyl esters 1 g capsule   Commonly known as:  Lovaza        Dose:  2 g   Take 2 g by mouth 2 times daily   Refills:  0       ZOLOFT PO        Dose:  150 mg   Take 150 mg by mouth daily   Refills:  0                Protect others around you: Learn how to safely use, store and throw away your medicines at www.disposemymeds.org.             Medication List: This is a list of all your medications and when to take them. Check marks below indicate your daily home schedule. Keep this list as a reference.      Medications           Morning Afternoon Evening Bedtime As Needed    cholecalciferol 36692 units capsule   Commonly known as:  VITAMIN D3   Take 1 capsule (50,000 Units) by mouth every 14 days SIG: TAKE ONE CAP EVERY OTHER WEEK, INDICATION: TO TREAT VITAMIN D DEFICIENCY (1ST AND 15TH OF EACH MONTH)                                Cyanocobalamin 5000 MCG/ML Liqd   Commonly known as:  B-12 SUPER STRENGTH   Place 1 mL under the tongue daily FOR VITAMIN B12 SUPPLEMENTATION, PLEASE PLACE UNDER THE TONGUE                                omega-3 acid ethyl esters 1 g capsule   Commonly known as:  Lovaza   Take 2 g by mouth 2 times daily                                ZOLOFT PO   Take 150 mg by mouth daily

## 2018-04-25 NOTE — OP NOTE
[facility] Operative Note    Patient: Judy Argueta  : 1985  MRN: [id number]    Date of Service: 2018    Pre-operative diagnosis:  Menorrhagia  Uterine mass    Post-operative diagnosis:  Same s/p procedure    Procedure: Exam under anesthesia, Hysteroscopy, Polypectomy    Surgeon: Surgeon(s) and Role:     * Maria Dolores Kincaid MD - Primary  Assistant: Quita Mohr, PGY1    Anesthesia: MAC and paracervical block    EBL: 10 mL  Urine: not measured  IV Fluids: 800 mL crystalloid  Fluid deficit: 115 mL normal saline    Specimens: endometrial curettings    Complications: none apparent    Findings: EUA revealed anteverted uterus, mobile with no adnexal masses. External genitalia normal, well rugated vagina. Cervix closed.  Hysteroscopic exam revealed three masses, likely polyps.  One polyp approximately 2cm located on the left uterine wall (4 o'clock position) and two smaller polyps on the right anterior wall and right posterior wall. All tissue was removed with the Myosure . Otherwise normal endometrial and cervical canal. Fluid deficit 115 mL normal saline. Tenaculum sites hemostatic at end of case.     Indications: Judy Argueta is a 32 year old female who presented with several years of menorrhagia.  A pelvic ultrasound revealed a 1.5cm mass, consistent with a fibroid or polyp.  Surgical management was recommended.  Risks, benefits, and alternatives to the procedure were discussed. The patient's questions were answered, understanding confirmed, and the patient signed written informed consent.    Technique: The patient was taken to the operating room where SCDs were placed prior to anesthesia. No antibiotics given. She was placed under MAC anesthesia.  After the patient was comfortable, she was placed in the dorsal lithotomy position with feet in stirrups.  An exam under anesthesia was performed with findings as noted above. The patient was prepped and draped in the usual sterile fashion. A  speculum was placed in the vagina and the cervix visualized. A single-toothed tenaculum was placed on the anterior lip of the cervix at 12 o'clock after infiltrating with 1mL 1% lidocaine plain.  A paracervical block was placed at 4 and 8-o'clock with the same local anesthetic; for total of 10mL used. The cervical os was carefully dilated to 21F with sequential Godinez dilators without difficulty. The operating hysteroscope was placed through the cervix with outflow turned on to achieve hydrodilation of the cervix while entering into the uterine cavity. Examination of the uterine cavity demonstrated three masses, likely polyps as described above. The remainder of the cavity was unremarkable. The hysteroscopic morcellator was then introduced and used to remove the tissue with good success. The hysteroscope apparatus was removed and total of 115 mL fluid deficit of normal saline noted.  The endometrial tissue was sent to pathology. The tenaculum was removed from the cervix and sites were noted to be oozing. Good hemostasis was noted after pressure and application of silver nitrate. The speculum was removed from the vagina.     Instrument, needle and sponge counts were correct x2. Dr. Kincaid was present and scrubbed for the entire procedure. The patient tolerated the procedure well. The patient was awoken in the OR and transferred to the PACU in stable condition.     Quita Mohr MD   Obstetrics & Gynecology PGY1  4/25/2018 8:34 AM    Physician Attestation   I was present for the entire procedure between opening and closing.    Maria Dolores Kincaid  Date of Service (when I saw the patient): 04/25/18

## 2018-04-25 NOTE — DISCHARGE INSTRUCTIONS
What happens after hysteroscopy?  You may have cramps and bleeding for 24 hours after the procedure. This is normal. Use pads instead of tampons.  Do not douche or use tampons until your health care provider says it s OK.  Do not use any vaginal medicines until you are told it s OK.  Ask your health care provider when it s OK to have sex again.  When should I call my health care provider?  Call your health care provider if you have:  Heavy bleeding (more than 1 pad an hour for 2 or more hours)  A fever over 100.4 F (38.0 C)  Increasing abdominal pain or tenderness  Foul-smelling discharge  Follow-up care  Schedule a follow-up visit with your health care provider. Based on the results of your test, you may need more treatment. Be sure to follow instructions and keep your appointments.      0309-4785 [employer]. [address] [place], [place] [address]. All rights reserved. This information is not intended as a substitute for professional medical care. Always follow your healthcare professional's instructions.   Same-Day Surgery   Adult Discharge Orders & Instructions     For 24 hours after surgery:  1. Get plenty of rest.  A responsible adult must stay with you for at least 24 hours after you leave the hospital.   2. Pain medication can slow your reflexes. Do not drive or use heavy equipment.  If you have weakness or tingling, don't drive or use heavy equipment until this feeling goes away.  3. Mixing alcohol and pain medication can cause dizziness and slow your breathing. It can even be fatal. Do not drink alcohol while taking pain medication.  4. Avoid strenuous or risky activities.  Ask for help when climbing stairs.   5. You may feel lightheaded.  If so, sit for a few minutes before standing.  Have someone help you get up.   6. If you have nausea (feel sick to your stomach), drink only clear liquids such as apple juice, ginger ale, broth or 7-Up.  Rest may also help.  Be sure to drink enough  fluids.  Move to a regular diet as you feel able. Take pain medications with a small amount of solid food, such as toast or crackers, to avoid nausea.   7. A slight fever is normal. Call the doctor if your fever is over 100 F (37.7 C) (taken under the tongue) or lasts longer than 24 hours.  8. You may have a dry mouth, muscle aches, trouble sleeping or a sore throat.  These symptoms should go away after 24 hours.  9. Do not make important or legal decisions.   Pain Management:      1. Take pain medication (if prescribed) for pain as directed by your physician.        2. WARNING: If the pain medication you have been prescribed contains Tylenol  (acetaminophen), DO NOT take additional doses of Tylenol (acetaminophen).     Call your doctor for any of the followin.  Signs of infection (fever, growing tenderness at the surgery site, severe pain, a large amount of drainage or bleeding, foul-smelling drainage, redness, swelling).    2.  It has been over 8 to 10 hours since surgery and you are still not able to urinate (pee).    3.  Headache for over 24 hours.    4.  Numbness, tingling or weakness the day after surgery (if you had spinal anesthesia).  To contact a doctor, call _____________________________________ or:      [phone] and ask for the Resident On Call for:          __________________________________________ (answered 24 hours a day)      Emergency Department:  [facility]: [phone]  [facility]: [phone]               Rev. 10/2014

## 2018-04-25 NOTE — BRIEF OP NOTE
[facility] OB/GYN Brief Operative Note    Pre-operative diagnosis: Uterine mass   Post-operative diagnosis: Same s/p procedure   Procedure: Procedure(s):  OPERATIVE HYSTEROSCOPY WITH MORCELLATOR     Surgeon: Dr. Kincaid   Assistant(s): Quita Mohr MD PGY1      Anesthesia: MAC     Estimated blood loss: 10mL   Total IV fluids: 800 mL, crystalloid   Fluid Deficit: 115mL, normal saline   Total urine output: Not measured   Drains: None   Specimens: Endometrial polyp       Complications: None apparent    Condition: Patient taken to recovery in stable condition.     Findings: EUA revealed NEFG, anteverted, mobile uterus.  Cervix closed, dilated to 21F with Godinez dilators.  Hysteroscopy revealed three masses, likely polyps.  One polyp approx 2cm on the left uterine wall (3-4 o'clock) and two smaller polyps on the right anterior wall and right posterior wall. Removed with Myosure.       Quita Mohr MD   4/25/2018 8:12 AM   OB/GYN Resident PGY1

## 2018-05-01 LAB — COPATH REPORT: NORMAL

## 2018-08-09 ENCOUNTER — TELEPHONE (OUTPATIENT)
Dept: ONCOLOGY | Facility: CLINIC | Age: 33
End: 2018-08-09

## 2018-11-29 ENCOUNTER — TELEPHONE (OUTPATIENT)
Dept: ONCOLOGY | Facility: CLINIC | Age: 33
End: 2018-11-29

## 2018-12-05 ENCOUNTER — ONCOLOGY VISIT (OUTPATIENT)
Dept: ONCOLOGY | Facility: CLINIC | Age: 33
End: 2018-12-05
Attending: INTERNAL MEDICINE
Payer: COMMERCIAL

## 2018-12-05 ENCOUNTER — HOSPITAL ENCOUNTER (OUTPATIENT)
Facility: CLINIC | Age: 33
Setting detail: SPECIMEN
Discharge: HOME | End: 2018-12-05
Attending: INTERNAL MEDICINE | Admitting: INTERNAL MEDICINE
Payer: COMMERCIAL

## 2018-12-05 VITALS
BODY MASS INDEX: 25.88 KG/M2 | TEMPERATURE: 98.3 F | HEART RATE: 88 BPM | SYSTOLIC BLOOD PRESSURE: 118 MMHG | RESPIRATION RATE: 16 BRPM | WEIGHT: 170.2 LBS | DIASTOLIC BLOOD PRESSURE: 68 MMHG | OXYGEN SATURATION: 98 %

## 2018-12-05 DIAGNOSIS — D50.9 IRON DEFICIENCY ANEMIA, UNSPECIFIED IRON DEFICIENCY ANEMIA TYPE: ICD-10-CM

## 2018-12-05 DIAGNOSIS — N92.4 EXCESSIVE BLEEDING IN PREMENOPAUSAL PERIOD: ICD-10-CM

## 2018-12-05 DIAGNOSIS — D50.9 IRON DEFICIENCY ANEMIA, UNSPECIFIED IRON DEFICIENCY ANEMIA TYPE: Primary | ICD-10-CM

## 2018-12-05 LAB
ERYTHROCYTE [DISTWIDTH] IN BLOOD BY AUTOMATED COUNT: 12.4 % (ref 10–15)
FERRITIN SERPL-MCNC: 41 NG/ML (ref 12–150)
HCT VFR BLD AUTO: 39.6 % (ref 35–47)
HGB BLD-MCNC: 13.2 G/DL (ref 11.7–15.7)
IRON SATN MFR SERPL: 34 % (ref 15–46)
IRON SERPL-MCNC: 96 UG/DL (ref 35–180)
MCH RBC QN AUTO: 30.9 PG (ref 26.5–33)
MCHC RBC AUTO-ENTMCNC: 33.3 G/DL (ref 31.5–36.5)
MCV RBC AUTO: 93 FL (ref 78–100)
PLATELET # BLD AUTO: 281 10E9/L (ref 150–450)
RBC # BLD AUTO: 4.27 10E12/L (ref 3.8–5.2)
TIBC SERPL-MCNC: 285 UG/DL (ref 240–430)
WBC # BLD AUTO: 5.7 10E9/L (ref 4–11)

## 2018-12-05 PROCEDURE — 82728 ASSAY OF FERRITIN: CPT | Performed by: INTERNAL MEDICINE

## 2018-12-05 PROCEDURE — 36415 COLL VENOUS BLD VENIPUNCTURE: CPT

## 2018-12-05 PROCEDURE — 99213 OFFICE O/P EST LOW 20 MIN: CPT | Performed by: INTERNAL MEDICINE

## 2018-12-05 PROCEDURE — G0463 HOSPITAL OUTPT CLINIC VISIT: HCPCS

## 2018-12-05 PROCEDURE — 83550 IRON BINDING TEST: CPT | Performed by: INTERNAL MEDICINE

## 2018-12-05 PROCEDURE — 83540 ASSAY OF IRON: CPT | Performed by: INTERNAL MEDICINE

## 2018-12-05 PROCEDURE — 85027 COMPLETE CBC AUTOMATED: CPT | Performed by: INTERNAL MEDICINE

## 2018-12-05 ASSESSMENT — PAIN SCALES - GENERAL: PAINLEVEL: NO PAIN (0)

## 2018-12-05 NOTE — PROGRESS NOTES
"Oncology Rooming Note    December 5, 2018 3:18 PM   Judy Argueta is a 33 year old female who presents for:    Chief Complaint   Patient presents with     Oncology Clinic Visit     Iron deficiency anemia, unspecified iron deficiency anemia type     Initial Vitals: /68 (BP Location: Left arm, Patient Position: Sitting, Cuff Size: Adult Large)  Pulse 88  Temp 98.3  F (36.8  C) (Oral)  Resp 16  Wt 77.2 kg (170 lb 3.2 oz)  SpO2 98%  BMI 25.88 kg/m2 Estimated body mass index is 25.88 kg/(m^2) as calculated from the following:    Height as of 4/25/18: 1.727 m (5' 8\").    Weight as of this encounter: 77.2 kg (170 lb 3.2 oz). Body surface area is 1.92 meters squared.  No Pain (0) Comment: Data Unavailable   No LMP recorded.  Allergies reviewed: Yes  Medications reviewed: Yes    Medications: Medication refills not needed today.  Pharmacy name entered into [facility]:    [facility] [address]  [facility] [address] [facility] [address] [facility] [address] [facility] [address] AT [facility] [address]    Clinical concerns: None            4 minutes for nursing intake (face to face time)     Ava Palmer MA            "

## 2018-12-05 NOTE — MR AVS SNAPSHOT
After Visit Summary   12/5/2018    Judy Argueta    MRN: [id number]           Patient Information     Date Of Birth          1985        Visit Information        Provider Department      12/5/2018 1:00 PM Nurse, [facility]        Today's Diagnoses     Iron deficiency anemia, unspecified iron deficiency anemia type        Excessive bleeding in premenopausal period           Follow-ups after your visit        Your next 10 appointments already scheduled     Dec 05, 2018  3:00 PM CST   Return Visit with Layo Joshi MD   [facility] ([facility])    [facility]  [address]  [facility] [id number] [phone]              Who to contact     If you have questions or need follow up information about today's clinic visit or your schedule please contact [facility] directly at [phone].  Normal or non-critical lab and imaging results will be communicated to you by MyChart, letter or phone within 4 business days after the clinic has received the results. If you do not hear from us within 7 days, please contact the clinic through [employer] or phone. If you have a critical or abnormal lab result, we will notify you by phone as soon as possible.  Submit refill requests through [employer] or call your pharmacy and they will forward the refill request to us. Please allow 3 business days for your refill to be completed.          Additional Information About Your Visit        MyChart Information     [employer] gives you secure access to your electronic health record. If you see a primary care provider, you can also send messages to your care team and make appointments. If you have questions, please call your primary care clinic.  If you do not have a primary care provider, please call [phone] and they will assist you.        Care EveryWhere ID     This is your Care EveryWhere ID. This could be used by other  organizations to access your [facility] medical records  [id number]         Blood Pressure from Last 3 Encounters:   04/25/18 110/73   04/16/18 110/73   02/23/18 (P) 103/64    Weight from Last 3 Encounters:   04/25/18 68.9 kg (151 lb 14.4 oz)   04/16/18 68 kg (150 lb)   02/23/18 (P) 65.6 kg (144 lb 9.6 oz)              We Performed the Following     CBC with platelets     Ferritin     Iron and iron binding capacity        Primary Care Provider Office Phone # Fax #    Layo Joshi [phone]       [facility] [address]  [facility] [address]        Equal Access to Services     TIM MARTINES : Hadii gurdeep paezo Monalisa, waaxda luqadaha, qaybta kaalmada adegersonyaluke, nabila valencia . So [facility] [phone].    ATENCIÓN: Si habla español, tiene a casarez disposición servicios gratuitos de asistencia lingüística. Llame al [phone].    We comply with applicable federal civil rights laws and [place] laws. We do not discriminate on the basis of race, color, national origin, age, disability, sex, sexual orientation, or gender identity.            Thank you!     Thank you for choosing [facility]  for your care. Our goal is always to provide you with excellent care. Hearing back from our patients is one way we can continue to improve our services. Please take a few minutes to complete the written survey that you may receive in the mail after your visit with us. Thank you!             Your Updated Medication List - Protect others around you: Learn how to safely use, store and throw away your medicines at www.disposemymeds.org.          This list is accurate as of 12/5/18  1:22 PM.  Always use your most recent med list.                   Brand Name Dispense Instructions for use Diagnosis    Cyanocobalamin 5000 MCG/ML Liqd    B-12 SUPER STRENGTH    2 Bottle    Place 1 mL under the tongue daily FOR VITAMIN B12 SUPPLEMENTATION, PLEASE PLACE UNDER THE  TONGUE    Fatigue, unspecified type       omega-3 acid ethyl esters 1 g capsule    LOVAZA     Take 2 g by mouth 2 times daily        vitamin D3 80958 units capsule    CHOLECALCIFEROL    40 capsule    Take 1 capsule (50,000 Units) by mouth every 14 days SIG: TAKE ONE CAP EVERY OTHER WEEK, INDICATION: TO TREAT VITAMIN D DEFICIENCY (1ST AND 15TH OF EACH MONTH)    Vitamin D deficiency       ZOLOFT PO      Take 150 mg by mouth daily

## 2018-12-05 NOTE — Clinical Note
"    12/5/2018         RE: Judy Argueta  [address]  [facility] [address]        Dear Colleague,    Thank you for referring your patient, Judy Argueta, to the [facility]. Please see a copy of my visit note below.    Oncology Rooming Note    December 5, 2018 3:18 PM   Judy Argueta is a 33 year old female who presents for:    Chief Complaint   Patient presents with     Oncology Clinic Visit     Iron deficiency anemia, unspecified iron deficiency anemia type     Initial Vitals: /68 (BP Location: Left arm, Patient Position: Sitting, Cuff Size: Adult Large)  Pulse 88  Temp 98.3  F (36.8  C) (Oral)  Resp 16  Wt 77.2 kg (170 lb 3.2 oz)  SpO2 98%  BMI 25.88 kg/m2 Estimated body mass index is 25.88 kg/(m^2) as calculated from the following:    Height as of 4/25/18: 1.727 m (5' 8\").    Weight as of this encounter: 77.2 kg (170 lb 3.2 oz). Body surface area is 1.92 meters squared.  No Pain (0) Comment: Data Unavailable   No LMP recorded.  Allergies reviewed: Yes  Medications reviewed: Yes    Medications: Medication refills not needed today.  Pharmacy name entered into [facility]:    [facility] [address]  [facility] [id number] [place] [address] [facility] [id number] [place] [address] [address] AT [facility] [address]    Clinical concerns: None            4 minutes for nursing intake (face to face time)     Ava Palmer MA              Visit Date:   12/05/2018      SUBJECTIVE:  Mrs. Argueta is a 33-year-old female who follows up in Hematology/Oncology Clinic for lymphadenopathy and iron deficiency anemia.  Her lymphadenopathy had resolved.       Her iron deficiency anemia was secondary to menorrhagia.  The patient has been treated with IV Injectafer before.      For menorrhagia, she was evaluated by gynecologist.  She had polyps.  She had a hysteroscopy and polypectomy in 04/2018.  Since then, her menstrual " bleeding has improved significantly.  She is no longer having menorrhagia.      The patient comes to the clinic because of worsening fatigue.  She is worried that her anemia and iron deficiency is getting worse; for the last 2 weeks, she has been more weak and tired.  She has been sleeping more.  She does not feel like getting up in the morning.      No headache.  No dizziness.  No chest pain.  No shortness of breath.  No nausea or vomiting.  No abnormal bleeding.  No fever or chills.      Labs were checked today.  Iron, ferritin and CBC are all normal.  She is not anemic or iron deficient.      ASSESSMENT:   1.  A 33-year-old female with history of iron deficiency anemia secondary to menorrhagia.  Iron deficiency anemia has resolved.   2.  Menorrhagia secondary to uterine polyp.  She is status post polypectomy.  Menorrhagia has resolved.   3.  Fatigue.      PLAN:   1.  Labs were reviewed with the patient.  I explained to her that CBC is normal.  Iron and ferritin are normal.  She is not anemic or iron deficient.  She was happy to know that.   The patient used to have menorrhagia.  That has improved with polypectomy.  Hopefully, she will not become iron deficient or anemic in the future.  The patient is worried and would like her labs to be monitored.  I told her to have CBC and iron studies checked in 6 months.   2.  Discussed regarding fatigue.  It is not due to anemia or iron deficiency.  I told her that maybe she has some kind of viral infection.  I told her to wait for 2-3 weeks.  If her fatigue does not get better, she should talk to her primary care physician.   3.  She had a few questions, which were all answered.  She will be seen in Hematology Clinic as needed.  Advised her to see a physician sooner if she has worsening fatigue, chest pain, shortness of breath, dizziness, abnormal bleeding or any other concerns.         PAULA BLUNT MD             D: 12/05/2018   T: 12/06/2018   MT: PERICO      Name:      DAYTON MULLEN   MRN:      [id number]        Account:      [id number]   :      1985           Visit Date:   2018      Document: [id number]       Again, thank you for allowing me to participate in the care of your patient.        Sincerely,        Layo Joshi MD

## 2018-12-05 NOTE — PROGRESS NOTES
Medical Assistant Note:  Judy Argueta presents today for lab visit.    Patient seen by provider today: Yes: Dr. Joshi.   present during visit today: Not Applicable.    Concerns: No Concerns.    Procedure:  Lab draw site: RAC, Needle type: BF, Gauge: 21 g gauze and coban applied.    Post Assessment:  Labs drawn without difficulty: Yes.    Discharge Plan:  Departure Mode: Ambulatory.    Face to Face Time: 4.    Ava Palmer MA

## 2018-12-06 NOTE — PROGRESS NOTES
Visit Date:   12/05/2018     HEMATOLOGY/ONCOLOGY HISTORY:  Judy Argueta is a female with lymphadenopathy and iron deficiency anemia.     1.  Patient has a history of lymphadenopathy for many years.  At age 16, she had a lump in the right neck which was biopsied and was benign.  At the age of 18, she had a lump in the right axilla, which was biopsied and was found to be benign.  At the age of 19, she again had a lump in the right neck which was biopsied and was benign.  None of those records are available.   2.  Patient had a CT of the neck done at [facility] in [place], [place] on 11/28/2013.  It revealed cervical lymphadenopathy which had mildly progressed from 2005 baseline.  It revealed mildly prominent cervical lymph nodes  bilaterally.  One at the right level 3 measured 1.6 x 0.6 x 4.1 cm.  The left level 3 lymph node measures 0.9 x 0.6 cm.   3.  CT of the neck, chest, abdomen and pelvis was done on 07/02/2014.  It revealed right sided level 3-4 lymph node measuring 1.5 x 0.8 x 4.2 cm.  Left sided level 2 lymph node measured up to 2 cm.  There was no lymphadenopathy or mass in the chest, abdomen and pelvis.   4.  CT of the neck was done on last 10/06/2014.  Lymph nodes had decreased slightly in size.  5. Multiple labs were done on 12/07/2017:   -Hemoglobin of 11 with an MCV of 80.  Retic of 0.7%.  Normal WBC and platelets.   -Iron of 26 with saturation of 7% and ferritin of 4.   -LDH of 130.   -TSH of 1.40.   6. She had hysteroscopy and polypectomy on 04/25/2018. Menstrual bleeding improved significantly.      SUBJECTIVE:  Mrs. Argueta is a 33-year-old female who follows up in Hematology/Oncology Clinic for lymphadenopathy and iron deficiency anemia.  Her lymphadenopathy has resolved.       Her iron deficiency anemia was secondary to menorrhagia.  Patient has been treated with IV Injectafer before.      For menorrhagia, she was evaluated by gynecologist.  She had polyps.  She had a hysteroscopy and polypectomy in  04/2018.  Since then, her menstrual bleeding has improved significantly.  She is no longer having menorrhagia.      Patient comes to the clinic because of worsening fatigue.  She is worried that her anemia and iron deficiency is getting worse; for the last 2 weeks, she has been more weak and tired.  She has been sleeping more.  She does not feel like getting up in the morning.      No headache.  No dizziness.  No chest pain.  No shortness of breath.  No nausea or vomiting.  No abnormal bleeding.  No fever or chills.      Labs were checked today.  Iron, ferritin and CBC are all normal.  She is not anemic or iron deficient.      ASSESSMENT:   1.  A 33-year-old female with history of iron deficiency anemia secondary to menorrhagia.  Iron deficiency anemia has resolved.   2.  Menorrhagia secondary to uterine polyp.  She is status post polypectomy.  Menorrhagia has resolved.   3.  Fatigue.      PLAN:   1.  Labs were reviewed with the patient.  I explained to her that CBC is normal.  Iron and ferritin are normal.  She is not anemic or iron deficient.  She was happy to know that.   Patient used to have menorrhagia.  That has improved with polypectomy.  Hopefully, she will not become iron deficient or anemic in the future. Patient is worried and would like her labs to be monitored.  I told her to have CBC and iron studies checked in 6 months.   2.  Discussed regarding fatigue.  It is not due to anemia or iron deficiency.  I told her that maybe she has some kind of viral infection.  I told her to wait for 2-3 weeks.  If her fatigue does not get better, she should talk to her primary care physician.   3.  She had a few questions, which were all answered.  She will be seen in Hematology Clinic as needed.  Advised her to see a physician sooner if she has worsening fatigue, chest pain, shortness of breath, dizziness, abnormal bleeding or any other concerns.         PAULA BLUNT MD             D: 12/05/2018   T: 12/06/2018    MT: PERICO      Name:     DAYTON MULLEN   MRN:      [id number]        Account:      [id number]   :      1985           Visit Date:   2018      Document: [id number]

## 2019-09-12 ENCOUNTER — TELEPHONE (OUTPATIENT)
Dept: ONCOLOGY | Facility: CLINIC | Age: 34
End: 2019-09-12

## 2019-09-13 ENCOUNTER — TELEPHONE (OUTPATIENT)
Dept: ONCOLOGY | Facility: CLINIC | Age: 34
End: 2019-09-13

## 2019-09-13 NOTE — TELEPHONE ENCOUNTER
Spoke with Judy, she has the flu. We were working on rescheduling and she hung up. I called her back a left a  with time slot options.

## 2019-11-19 ENCOUNTER — TELEPHONE (OUTPATIENT)
Dept: ONCOLOGY | Facility: CLINIC | Age: 34
End: 2019-11-19

## 2019-12-10 ENCOUNTER — HOSPITAL ENCOUNTER (OUTPATIENT)
Facility: CLINIC | Age: 34
Setting detail: SPECIMEN
End: 2019-12-10
Attending: INTERNAL MEDICINE
Payer: COMMERCIAL

## 2019-12-16 ENCOUNTER — HOSPITAL ENCOUNTER (OUTPATIENT)
Facility: CLINIC | Age: 34
Setting detail: SPECIMEN
Discharge: HOME | End: 2019-12-16
Attending: INTERNAL MEDICINE | Admitting: INTERNAL MEDICINE
Payer: COMMERCIAL

## 2019-12-16 ENCOUNTER — INFUSION THERAPY VISIT (OUTPATIENT)
Dept: INFUSION THERAPY | Facility: CLINIC | Age: 34
End: 2019-12-16
Attending: INTERNAL MEDICINE
Payer: COMMERCIAL

## 2019-12-16 ENCOUNTER — ONCOLOGY VISIT (OUTPATIENT)
Dept: ONCOLOGY | Facility: CLINIC | Age: 34
End: 2019-12-16
Attending: INTERNAL MEDICINE
Payer: COMMERCIAL

## 2019-12-16 VITALS
TEMPERATURE: 98.3 F | RESPIRATION RATE: 16 BRPM | SYSTOLIC BLOOD PRESSURE: 118 MMHG | WEIGHT: 150 LBS | DIASTOLIC BLOOD PRESSURE: 82 MMHG | OXYGEN SATURATION: 100 % | BODY MASS INDEX: 22.73 KG/M2 | HEART RATE: 66 BPM | HEIGHT: 68 IN

## 2019-12-16 DIAGNOSIS — T88.7XXA MEDICATION SIDE EFFECTS: ICD-10-CM

## 2019-12-16 DIAGNOSIS — D50.9 IRON DEFICIENCY ANEMIA, UNSPECIFIED IRON DEFICIENCY ANEMIA TYPE: ICD-10-CM

## 2019-12-16 DIAGNOSIS — D50.9 IRON DEFICIENCY ANEMIA, UNSPECIFIED IRON DEFICIENCY ANEMIA TYPE: Primary | ICD-10-CM

## 2019-12-16 LAB
ERYTHROCYTE [DISTWIDTH] IN BLOOD BY AUTOMATED COUNT: 13.3 % (ref 10–15)
FERRITIN SERPL-MCNC: 9 NG/ML (ref 12–150)
HCT VFR BLD AUTO: 37.3 % (ref 35–47)
HGB BLD-MCNC: 12.2 G/DL (ref 11.7–15.7)
IRON SATN MFR SERPL: 14 % (ref 15–46)
IRON SERPL-MCNC: 41 UG/DL (ref 35–180)
MCH RBC QN AUTO: 30.6 PG (ref 26.5–33)
MCHC RBC AUTO-ENTMCNC: 32.7 G/DL (ref 31.5–36.5)
MCV RBC AUTO: 94 FL (ref 78–100)
PLATELET # BLD AUTO: 248 10E9/L (ref 150–450)
RBC # BLD AUTO: 3.99 10E12/L (ref 3.8–5.2)
TIBC SERPL-MCNC: 300 UG/DL (ref 240–430)
WBC # BLD AUTO: 5.3 10E9/L (ref 4–11)

## 2019-12-16 PROCEDURE — 83540 ASSAY OF IRON: CPT | Performed by: INTERNAL MEDICINE

## 2019-12-16 PROCEDURE — 83550 IRON BINDING TEST: CPT | Performed by: INTERNAL MEDICINE

## 2019-12-16 PROCEDURE — 85027 COMPLETE CBC AUTOMATED: CPT | Performed by: INTERNAL MEDICINE

## 2019-12-16 PROCEDURE — 82728 ASSAY OF FERRITIN: CPT | Performed by: INTERNAL MEDICINE

## 2019-12-16 PROCEDURE — 36415 COLL VENOUS BLD VENIPUNCTURE: CPT

## 2019-12-16 PROCEDURE — G0463 HOSPITAL OUTPT CLINIC VISIT: HCPCS

## 2019-12-16 PROCEDURE — 99214 OFFICE O/P EST MOD 30 MIN: CPT | Performed by: INTERNAL MEDICINE

## 2019-12-16 RX ORDER — HEPARIN SODIUM (PORCINE) LOCK FLUSH IV SOLN 100 UNIT/ML 100 UNIT/ML
5 SOLUTION INTRAVENOUS
Status: CANCELLED | OUTPATIENT
Start: 2019-12-16

## 2019-12-16 RX ORDER — HEPARIN SODIUM,PORCINE 10 UNIT/ML
5 VIAL (ML) INTRAVENOUS
Status: CANCELLED | OUTPATIENT
Start: 2019-12-16

## 2019-12-16 RX ORDER — MULTIPLE VITAMINS W/ MINERALS TAB 9MG-400MCG
1 TAB ORAL DAILY
COMMUNITY

## 2019-12-16 ASSESSMENT — PAIN SCALES - GENERAL: PAINLEVEL: NO PAIN (0)

## 2019-12-16 ASSESSMENT — MIFFLIN-ST. JEOR: SCORE: 1438.9

## 2019-12-16 NOTE — PROGRESS NOTES
Medical Assistant Note:  Judy Argueta presents today for blood draw.    Patient seen by provider today: Yes: Dr Joshi.   present during visit today: Not Applicable.    Concerns: No Concerns.    Procedure:  Lab draw site: RAC, Needle type: BF, Gauge: 23g with gauze and coban.    Post Assessment:  Labs drawn without difficulty: Yes.    Discharge Plan:  Departure Mode: Ambulatory.    Face to Face Time: 5.    Karo Fuller, CMA

## 2019-12-16 NOTE — PATIENT INSTRUCTIONS
1. IV injectafer x 2 doses. Side-effect.  Scheduled/ Sonja   2. See Gynecologist.  3. See Marcos Moreno in about 3 months with labs.  Scheduled 3/23/20/ Sonja    AVS printed and given to patient/ Sonja

## 2019-12-16 NOTE — PROGRESS NOTES
"Oncology Rooming Note    December 16, 2019 8:36 AM   Judy Argueta is a 34 year old female who presents for:    Chief Complaint   Patient presents with     Oncology Clinic Visit     Initial Vitals: /82   Pulse 66   Temp 98.3  F (36.8  C) (Oral)   Resp 16   Ht 1.727 m (5' 8\")   Wt 68 kg (150 lb)   SpO2 100%   BMI 22.81 kg/m   Estimated body mass index is 22.81 kg/m  as calculated from the following:    Height as of this encounter: 1.727 m (5' 8\").    Weight as of this encounter: 68 kg (150 lb). Body surface area is 1.81 meters squared.  No Pain (0) Comment: Data Unavailable   No LMP recorded.  Allergies reviewed: Yes  Medications reviewed: Yes    Medications: Medication refills not needed today.  Pharmacy name entered into [email or web address]:    [facility] [address] DRUG STORE #[id number] [facility] [address] [facility] AT [facility] DRUG STORE #[id number] [facility] [address] AT [facility] [address]    Clinical concerns: no      Karo Fuller CMA            "

## 2019-12-16 NOTE — PROGRESS NOTES
Visit Date:   12/16/2019     HEMATOLOGY/ONCOLOGY HISTORY:  Judy Argueta is a female with lymphadenopathy and iron deficiency anemia.     1.  Patient has a history of lymphadenopathy for many years.  At age 16, she had a lump in the right neck which was biopsied and was benign.  At the age of 18, she had a lump in the right axilla, which was biopsied and was found to be benign.  At the age of 19, she again had a lump in the right neck which was biopsied and was benign.  None of those records are available.   2.  Patient had a CT of the neck done at [facility] in [place], [place] on 11/28/2013.  It revealed cervical lymphadenopathy which had mildly progressed from 2005 baseline.  It revealed mildly prominent cervical lymph nodes  bilaterally.  One at the right level 3 measured 1.6 x 0.6 x 4.1 cm.  The left level 3 lymph node measures 0.9 x 0.6 cm.   3.  CT of the neck, chest, abdomen and pelvis was done on 07/02/2014.  It revealed right sided level 3-4 lymph node measuring 1.5 x 0.8 x 4.2 cm.  Left sided level 2 lymph node measured up to 2 cm.  There was no lymphadenopathy or mass in the chest, abdomen and pelvis.   4.  CT of the neck was done on last 10/06/2014.  Lymph nodes had decreased slightly in size.  5. Multiple labs were done on 12/07/2017:   -Hemoglobin of 11 with an MCV of 80.  Retic of 0.7%.  Normal WBC and platelets.   -Iron of 26 with saturation of 7% and ferritin of 4.   -LDH of 130.   -TSH of 1.40.   6. She had hysteroscopy and polypectomy on 04/25/2018. Menstrual bleeding improved significantly.      SUBJECTIVE:  Ms. Argueta is a 34-year-old female with a history of iron deficiency anemia secondary to menorrhagia.  Previously she had hysteroscopy and polypectomy.      The patient presents to the clinic as she has not been feeling well for last few weeks.  She has worsening weakness.  She has been sleeping more.  She has been getting easy bruising. She is feeling more cold.  Occasionally has muscle  cramps.   All these symptoms happen when she becomes iron deficient.       No headache.  Some lightheadedness.  No chest pain.  No shortness of breath at rest.  Gets short of breath on exertion.  Also gets palpitations.  No abdominal pain, nausea or vomiting.  No urinary complaints.      She has menstrual bleed every month.  She bleeds for 7 days.  They are heavy.      The patient has tried oral iron.  She is not able to take it as it causes severe abdominal discomfort.  Previously, she was also found to be not absorbing oral iron well.      PHYSICAL EXAMINATION:   GENERAL:  She is alert and oriented x 3.   VITAL SIGNS:  Reviewed. ECOG PS of 2.  EYES:  No icterus.   THROAT:  No ulcer or thrush.   NECK:  Supple. No lymphadenopathy or thyromegaly.   AXILLAE:  No lymphadenopathy.   LUNGS:  Good air entry bilaterally.  No crackles or wheezing.   HEART:  Regular.  No murmur.   GI:  Soft and nontender.  No mass.   EXTREMITIES:  No edema.  No calf swelling or tenderness.   SKIN:  There are no petechiae.  There were no ecchymotic spots.  The patient showed me a picture of recent ecchymosis on her thigh.      LABORATORY DATA:  Reviewed.  Ferritin of 9.      ASSESSMENT:     1.  A 34-year-old female with iron deficiency anemia.  This is secondary to menstrual blood loss.   2.  Menorrhagia   3.  Intolerant to oral iron.      PLAN:   1.  I discussed regarding her symptoms.  This is secondary to iron deficiency anemia.  She is severely iron deficient.  Iron deficiency is secondary to menorrhagia.      I advised her to see a gynecologist.  She is going to do that.     2.  Discussed regarding treatment.  Oral iron has not helped and also causes abdominal pain.      Discussed regarding IV iron.  She is agreeable for it.  We will give her 2 doses of IV Injectafer.  Side effects including anaphylactic reactions were discussed.  She has previously tolerated it well and I am hoping she will tolerate it well.     3.  She will see our  nurse practitioner in 3 months' time with labs.  The patient advised to call us if she has worsening weakness, chest pain, shortness of breath, or any other concerns.         PAULA BLUNT MD             D: 2019   T: 2019   MT: JULISSA      Name:     DAYTON MULLEN   MRN:      [id number]        Account:      [id number]   :      1985           Visit Date:   2019      Document: [id number]

## 2019-12-17 ENCOUNTER — INFUSION THERAPY VISIT (OUTPATIENT)
Dept: INFUSION THERAPY | Facility: CLINIC | Age: 34
End: 2019-12-17
Attending: INTERNAL MEDICINE
Payer: COMMERCIAL

## 2019-12-17 VITALS
DIASTOLIC BLOOD PRESSURE: 71 MMHG | OXYGEN SATURATION: 98 % | RESPIRATION RATE: 18 BRPM | SYSTOLIC BLOOD PRESSURE: 108 MMHG | HEART RATE: 63 BPM | TEMPERATURE: 98.3 F

## 2019-12-17 DIAGNOSIS — K90.9 MALABSORPTION OF IRON: ICD-10-CM

## 2019-12-17 DIAGNOSIS — T88.7XXA MEDICATION SIDE EFFECTS: Primary | ICD-10-CM

## 2019-12-17 DIAGNOSIS — D50.9 IRON DEFICIENCY ANEMIA, UNSPECIFIED IRON DEFICIENCY ANEMIA TYPE: ICD-10-CM

## 2019-12-17 PROCEDURE — 96365 THER/PROPH/DIAG IV INF INIT: CPT

## 2019-12-17 PROCEDURE — 25800030 ZZH RX IP 258 OP 636: Performed by: INTERNAL MEDICINE

## 2019-12-17 PROCEDURE — 25000128 H RX IP 250 OP 636: Performed by: INTERNAL MEDICINE

## 2019-12-17 RX ORDER — HEPARIN SODIUM (PORCINE) LOCK FLUSH IV SOLN 100 UNIT/ML 100 UNIT/ML
5 SOLUTION INTRAVENOUS
Status: CANCELLED | OUTPATIENT
Start: 2019-12-24

## 2019-12-17 RX ORDER — HEPARIN SODIUM,PORCINE 10 UNIT/ML
5 VIAL (ML) INTRAVENOUS
Status: CANCELLED | OUTPATIENT
Start: 2019-12-24

## 2019-12-17 RX ADMIN — SODIUM CHLORIDE 250 ML: 9 INJECTION, SOLUTION INTRAVENOUS at 15:37

## 2019-12-17 RX ADMIN — FERRIC CARBOXYMALTOSE INJECTION 750 MG: 50 INJECTION, SOLUTION INTRAVENOUS at 15:37

## 2019-12-17 ASSESSMENT — PAIN SCALES - GENERAL: PAINLEVEL: NO PAIN (0)

## 2019-12-17 NOTE — PROGRESS NOTES
Infusion Nursing Note:  Judy Vaughnlilli presents today for Injectafer, 1 of 2  Patient seen by provider today: No   present during visit today: Not Applicable.102 66    Note: N/A.    Intravenous Access:  Peripheral IV placed.    Treatment Conditions:  Not Applicable.      Post Infusion Assessment:  Patient tolerated infusion without incident.  Patient observed for 30 minutes post injectafer per protocol.  Blood return noted pre and post infusion.  Site patent and intact, free from redness, edema or discomfort.  No evidence of extravasations.  Access discontinued per protocol.       Discharge Plan:   Discharge instructions reviewed with: Patient.  Patient and/or family verbalized understanding of discharge instructions and all questions answered.  AVS to patient via [employer].  Patient will return 12/24/19 for next appointment.   Patient discharged in stable condition accompanied by: self.  Departure Mode: Ambulatory.    No Moreno RN

## 2019-12-24 ENCOUNTER — HOSPITAL ENCOUNTER (OUTPATIENT)
Facility: CLINIC | Age: 34
Setting detail: SPECIMEN
End: 2019-12-24
Attending: INTERNAL MEDICINE
Payer: COMMERCIAL

## 2019-12-29 ENCOUNTER — HOSPITAL ENCOUNTER (EMERGENCY)
Facility: CLINIC | Age: 34
Discharge: HOME | End: 2019-12-30
Attending: EMERGENCY MEDICINE | Admitting: EMERGENCY MEDICINE
Payer: COMMERCIAL

## 2019-12-29 DIAGNOSIS — R10.84 ABDOMINAL PAIN, GENERALIZED: ICD-10-CM

## 2019-12-29 DIAGNOSIS — E86.0 DEHYDRATION: ICD-10-CM

## 2019-12-29 DIAGNOSIS — R11.2 NON-INTRACTABLE VOMITING WITH NAUSEA, UNSPECIFIED VOMITING TYPE: ICD-10-CM

## 2019-12-29 LAB
ALBUMIN SERPL-MCNC: 3.7 G/DL (ref 3.4–5)
ALP SERPL-CCNC: 52 U/L (ref 40–150)
ALT SERPL W P-5'-P-CCNC: 18 U/L (ref 0–50)
ANION GAP SERPL CALCULATED.3IONS-SCNC: 6 MMOL/L (ref 3–14)
AST SERPL W P-5'-P-CCNC: 16 U/L (ref 0–45)
BILIRUB SERPL-MCNC: 0.6 MG/DL (ref 0.2–1.3)
BUN SERPL-MCNC: 11 MG/DL (ref 7–30)
CALCIUM SERPL-MCNC: 8.2 MG/DL (ref 8.5–10.1)
CHLORIDE SERPL-SCNC: 109 MMOL/L (ref 94–109)
CO2 SERPL-SCNC: 22 MMOL/L (ref 20–32)
CREAT SERPL-MCNC: 0.62 MG/DL (ref 0.52–1.04)
GFR SERPL CREATININE-BSD FRML MDRD: >90 ML/MIN/{1.73_M2}
GLUCOSE SERPL-MCNC: 107 MG/DL (ref 70–99)
LIPASE SERPL-CCNC: 74 U/L (ref 73–393)
POTASSIUM SERPL-SCNC: 3.4 MMOL/L (ref 3.4–5.3)
PROT SERPL-MCNC: 6.9 G/DL (ref 6.8–8.8)
SODIUM SERPL-SCNC: 137 MMOL/L (ref 133–144)

## 2019-12-29 PROCEDURE — 81001 URINALYSIS AUTO W/SCOPE: CPT | Performed by: EMERGENCY MEDICINE

## 2019-12-29 PROCEDURE — 25000128 H RX IP 250 OP 636: Performed by: EMERGENCY MEDICINE

## 2019-12-29 PROCEDURE — 25800030 ZZH RX IP 258 OP 636: Performed by: EMERGENCY MEDICINE

## 2019-12-29 PROCEDURE — 96375 TX/PRO/DX INJ NEW DRUG ADDON: CPT

## 2019-12-29 PROCEDURE — 25000125 ZZHC RX 250: Performed by: EMERGENCY MEDICINE

## 2019-12-29 PROCEDURE — 83690 ASSAY OF LIPASE: CPT | Performed by: EMERGENCY MEDICINE

## 2019-12-29 PROCEDURE — 96374 THER/PROPH/DIAG INJ IV PUSH: CPT

## 2019-12-29 PROCEDURE — 80053 COMPREHEN METABOLIC PANEL: CPT | Performed by: EMERGENCY MEDICINE

## 2019-12-29 PROCEDURE — 96361 HYDRATE IV INFUSION ADD-ON: CPT

## 2019-12-29 PROCEDURE — 99284 EMERGENCY DEPT VISIT MOD MDM: CPT | Mod: 25

## 2019-12-29 PROCEDURE — 25000132 ZZH RX MED GY IP 250 OP 250 PS 637: Performed by: EMERGENCY MEDICINE

## 2019-12-29 RX ORDER — ONDANSETRON 4 MG/1
4 TABLET, ORALLY DISINTEGRATING ORAL EVERY 8 HOURS PRN
Qty: 10 TABLET | Refills: 0 | Status: SHIPPED | OUTPATIENT
Start: 2019-12-29 | End: 2020-01-01

## 2019-12-29 RX ORDER — KETOROLAC TROMETHAMINE 15 MG/ML
15 INJECTION, SOLUTION INTRAMUSCULAR; INTRAVENOUS ONCE
Status: COMPLETED | OUTPATIENT
Start: 2019-12-29 | End: 2019-12-29

## 2019-12-29 RX ORDER — ONDANSETRON 2 MG/ML
4 INJECTION INTRAMUSCULAR; INTRAVENOUS EVERY 30 MIN PRN
Status: DISCONTINUED | OUTPATIENT
Start: 2019-12-29 | End: 2019-12-30 | Stop reason: HOSPADM

## 2019-12-29 RX ADMIN — KETOROLAC TROMETHAMINE 15 MG: 15 INJECTION, SOLUTION INTRAMUSCULAR; INTRAVENOUS at 22:33

## 2019-12-29 RX ADMIN — ONDANSETRON 4 MG: 2 INJECTION INTRAMUSCULAR; INTRAVENOUS at 22:31

## 2019-12-29 RX ADMIN — LIDOCAINE HYDROCHLORIDE 30 ML: 20 SOLUTION ORAL; TOPICAL at 23:21

## 2019-12-29 RX ADMIN — SODIUM CHLORIDE 1000 ML: 9 INJECTION, SOLUTION INTRAVENOUS at 23:25

## 2019-12-29 RX ADMIN — SODIUM CHLORIDE 1000 ML: 9 INJECTION, SOLUTION INTRAVENOUS at 22:30

## 2019-12-29 ASSESSMENT — ENCOUNTER SYMPTOMS
ABDOMINAL PAIN: 1
VOMITING: 1
DIARRHEA: 0
COUGH: 0
MYALGIAS: 1
SORE THROAT: 0
RHINORRHEA: 0
NAUSEA: 1
BACK PAIN: 1

## 2019-12-29 ASSESSMENT — MIFFLIN-ST. JEOR: SCORE: 1416.22

## 2019-12-29 NOTE — ED AVS SNAPSHOT
Emergency Department  [address] [place] [address]  Phone:  [phone]  Fax:  [phone]                                    Judy Argueta   MRN: [id number]    Department:   Emergency Department   Date of Visit:  12/29/2019           After Visit Summary Signature Page    I have received my discharge instructions, and my questions have been answered. I have discussed any challenges I see with this plan with the nurse or doctor.    ..........................................................................................................................................  Patient/Patient Representative Signature      ..........................................................................................................................................  Patient Representative Print Name and Relationship to Patient    ..................................................               ................................................  Date                                   Time    ..........................................................................................................................................  Reviewed by Signature/Title    ...................................................              ..............................................  Date                                               Time          22EPIC Rev 08/18

## 2019-12-30 VITALS
HEART RATE: 87 BPM | DIASTOLIC BLOOD PRESSURE: 68 MMHG | SYSTOLIC BLOOD PRESSURE: 120 MMHG | WEIGHT: 145 LBS | OXYGEN SATURATION: 98 % | HEIGHT: 68 IN | BODY MASS INDEX: 21.98 KG/M2 | RESPIRATION RATE: 16 BRPM | TEMPERATURE: 97.9 F

## 2019-12-30 LAB
ALBUMIN UR-MCNC: 30 MG/DL
APPEARANCE UR: CLEAR
BILIRUB UR QL STRIP: NEGATIVE
COLOR UR AUTO: YELLOW
GLUCOSE UR STRIP-MCNC: NEGATIVE MG/DL
HGB UR QL STRIP: NEGATIVE
KETONES UR STRIP-MCNC: >150 MG/DL
LEUKOCYTE ESTERASE UR QL STRIP: NEGATIVE
MUCOUS THREADS #/AREA URNS LPF: PRESENT /LPF
NITRATE UR QL: NEGATIVE
PH UR STRIP: 6 PH (ref 5–7)
RBC #/AREA URNS AUTO: 9 /HPF (ref 0–2)
SOURCE: ABNORMAL
SP GR UR STRIP: >1.035 (ref 1–1.03)
SQUAMOUS #/AREA URNS AUTO: 3 /HPF (ref 0–1)
UROBILINOGEN UR STRIP-MCNC: NORMAL MG/DL (ref 0–2)
WBC #/AREA URNS AUTO: 0 /HPF (ref 0–5)

## 2019-12-30 NOTE — ED PROVIDER NOTES
History     Chief Complaint:  Nausea and vomiting     HPI  Judy Argueta is a 34 year old female with a history of iron deficiency with recent infusion 2 weeks ago who presents with nausea and vomiting. The patient has had nausea since last night and nonstop vomiting as of today. She has also noticed myalgias, decreased urination, generalized abdominal pain, and bilateral low back pain. Here, she denies any cough, rhinorrhea, sore throat, or diarrhea. The patient reports a temp of 100. She has had no ill contacts. No suspicion of food poisoning as the patient eat same food as friends. Due for menstrual period in 1-2 weeks. No vaginal bleeding or discharge. She has not had a bowel movement today and furthermore does not remember the last time she had one. Is passing gas. She took two Tylenol, her Zoloft, and pepto-bismol but was not able to keep those down.    Allergies:  Codeine; Darvocet [propoxyphene n-apap]; Dilaudid [hydromorphone]; Morphine; Oxycontin [oxycodone]; Percocet [oxycodone-acetaminophen]; Sulfa drugs; and Vicodin [hydrocodone-acetaminophen]    Medications:    Cyanocobalamin (B-12 SUPER STRENGTH) 5000 MCG/ML LIQD  multivitamin w/minerals (MULTI-VITAMIN) tablet  omega-3 acid ethyl esters (LOVAZA) 1 G capsule  Sertraline HCl (ZOLOFT PO)    Past Medical History:    High risk HPV infection   Menorrhagia   Iron deficiency anemia   Right ovarian cyst      Past Surgical History:    Dissect lymph node   Operative hysteroscopy with morcellator     Family History:    Hypertension   Hyperlipidemia      Social History:  Marital Status:  Single   Smoker:   Never   Smokeless:   Never   Alcohol:   Yes, wine every couple of weeks   Drugs:   No   Arrives with:   Friend     Review of Systems   HENT: Negative for rhinorrhea and sore throat.    Respiratory: Negative for cough.    Gastrointestinal: Positive for abdominal pain, nausea and vomiting. Negative for diarrhea.   Genitourinary: Positive for decreased urine  "volume. Negative for vaginal bleeding and vaginal discharge.   Musculoskeletal: Positive for back pain and myalgias.   All other systems reviewed and are negative.    Physical Exam     Patient Vitals for the past 24 hrs:   BP Temp Temp src Pulse Resp SpO2 Height Weight   12/29/19 2327 -- -- -- -- -- 100 % -- --   12/29/19 2326 116/82 -- -- 87 -- -- -- --   12/29/19 2141 129/66 97.9  F (36.6  C) Oral 87 16 100 % 1.727 m (5' 8\") 65.8 kg (145 lb)     Physical Exam  General: Lying on her side  Eyes:  The pupils are equal and round    Conjunctivae and sclerae are normal  ENT:    Moist mucous membranes  Neck:  Normal range of motion  CV:  Regular rate and rhythm    Skin warm and well perfused   Resp:  Lungs are clear    Non-labored    No rales    No wheezing   GI:  Abdomen is soft, there is no rigidity    No distension    No rebound tenderness     Mild diffuse abdominal tenderness  MS:  Normal muscular tone  Skin:  No rash or acute skin lesions noted  Neuro:   Awake, alert.      Speech is normal and fluent.    Face is symmetric.     Moves all extremities equally  Psych: Normal affect.  Appropriate interactions.    Emergency Department Course   Laboratory:  CMP: Glucose 107, calcium 8.2, o/w WNL (Creatinine: 0.62)  UA with Microscopic: pending  Lipase: 74    Interventions:  2230: NS 1L IV Bolus   2231: Zofran 4 mg IV  2233: Toradol 15 mg IV  2321: GI Cocktail - Maalox 15 mL, Viscous Lidocaine 15 mL, 30 mL suspension PO   2325: NS 1L IV Bolus     Emergency Department Course:  2203 I performed an exam of the patient as documented above.   2314 Mild diffuse abdominal tenderness with more focal tenderness to palpation in the epigastic area.   2346 I rechecked the patient and discussed the results of their workup thus far.   0012 Minimal epigastric tenderness. No focal RLQ tenderness. Feels better, tolerated oral intake with no vomiting  Findings and plan explained. Patient discharged home with instructions regarding supportive " care, medications, and reasons to return. The importance of close follow-up was reviewed. I personally reviewed the workup results with them and answered all related questions prior to discharge.    Impression & Plan    Medical Decision Making:  Judy Argueta is a 34 year old female who presents for nausea and vomiting.  Patient with primarily nausea and vomiting and then developed some diffuse abdominal tenderness.  Having myalgias and a temp of 100 max at home.  Diffuse abdominal tenderness on exam.  Given 2 L of IV fluids, antiemetics and feeling much better.  Was able to urinate after the 2 L of fluids.  Her urinalysis shows no evidence of infection and is mildly contaminated with squamous cells and minimal red blood cells.  Still having ketones in the urine but now she is able to tolerate oral intake so should be able to keep her self hydrated as she feels much better.  Repeated exams several times and no focal lower abdominal tenderness such as on right lower quadrant to suggest appendicitis.  GI cocktail helped quite a bit.  Given improving exam and feeling much better, do not think CT abdomen is indicated at this time which she was agreeable with.  Discussed monitoring for her symptoms especially focal abdominal tenderness which would require repeat visit. Doubt ureteral stone, obstruction, PID, ovarian torsion, cholecystitis, etc. Is in relationship with female and denies any chance of pregnancy. Zofran for discharge PRN.     Diagnosis:    ICD-10-CM    1. Non-intractable vomiting with nausea, unspecified vomiting type R11.2    2. Abdominal pain, generalized R10.84    3. Dehydration E86.0      Disposition:  Discharged home with Zofran.    Discharge Medications:  New Prescriptions    ONDANSETRON (ZOFRAN ODT) 4 MG ODT TAB    Take 1 tablet (4 mg) by mouth every 8 hours as needed     Scribe Disclosure: Wally RING, am serving as a scribe on 12/29/2019 at 10:03 PM to personally document services performed by  Althea Angelo MD based on my observations and the provider's statements to me.      Althea Angelo MD  12/30/19 0030

## 2020-01-08 ENCOUNTER — INFUSION THERAPY VISIT (OUTPATIENT)
Dept: INFUSION THERAPY | Facility: CLINIC | Age: 35
End: 2020-01-08
Attending: INTERNAL MEDICINE
Payer: COMMERCIAL

## 2020-01-08 VITALS
HEART RATE: 69 BPM | TEMPERATURE: 98.5 F | DIASTOLIC BLOOD PRESSURE: 72 MMHG | SYSTOLIC BLOOD PRESSURE: 108 MMHG | RESPIRATION RATE: 16 BRPM | OXYGEN SATURATION: 100 %

## 2020-01-08 DIAGNOSIS — K90.9 MALABSORPTION OF IRON: ICD-10-CM

## 2020-01-08 DIAGNOSIS — D50.9 IRON DEFICIENCY ANEMIA, UNSPECIFIED IRON DEFICIENCY ANEMIA TYPE: ICD-10-CM

## 2020-01-08 DIAGNOSIS — T88.7XXA MEDICATION SIDE EFFECTS: Primary | ICD-10-CM

## 2020-01-08 PROCEDURE — 96365 THER/PROPH/DIAG IV INF INIT: CPT

## 2020-01-08 PROCEDURE — 25000132 ZZH RX MED GY IP 250 OP 250 PS 637: Performed by: INTERNAL MEDICINE

## 2020-01-08 PROCEDURE — 25800030 ZZH RX IP 258 OP 636: Performed by: INTERNAL MEDICINE

## 2020-01-08 PROCEDURE — 25000128 H RX IP 250 OP 636: Performed by: INTERNAL MEDICINE

## 2020-01-08 RX ORDER — HEPARIN SODIUM,PORCINE 10 UNIT/ML
5 VIAL (ML) INTRAVENOUS
Status: CANCELLED | OUTPATIENT
Start: 2020-01-14

## 2020-01-08 RX ORDER — HEPARIN SODIUM (PORCINE) LOCK FLUSH IV SOLN 100 UNIT/ML 100 UNIT/ML
5 SOLUTION INTRAVENOUS
Status: CANCELLED | OUTPATIENT
Start: 2020-01-14

## 2020-01-08 RX ORDER — ACETAMINOPHEN 325 MG/1
650 TABLET ORAL EVERY 4 HOURS PRN
Status: CANCELLED
Start: 2020-01-14

## 2020-01-08 RX ORDER — ACETAMINOPHEN 325 MG/1
650 TABLET ORAL EVERY 4 HOURS PRN
Status: DISCONTINUED | OUTPATIENT
Start: 2020-01-08 | End: 2020-01-08 | Stop reason: HOSPADM

## 2020-01-08 RX ADMIN — SODIUM CHLORIDE 250 ML: 9 INJECTION, SOLUTION INTRAVENOUS at 11:41

## 2020-01-08 RX ADMIN — ACETAMINOPHEN 650 MG: 325 TABLET, FILM COATED ORAL at 11:24

## 2020-01-08 RX ADMIN — FERRIC CARBOXYMALTOSE INJECTION 750 MG: 50 INJECTION, SOLUTION INTRAVENOUS at 11:42

## 2020-01-08 ASSESSMENT — PAIN SCALES - GENERAL: PAINLEVEL: NO PAIN (0)

## 2020-01-08 NOTE — PROGRESS NOTES
Infusion Nursing Note:  Judy Argueta presents today for Injectafer.    Patient seen by provider today: No   present during visit today: Not Applicable.    Note: Patient states that a few days after her first infusion of injectafer, she developed fevers and body aches for about a day. Denied any breathing difficulties, chest pain, itching or hives. States she called and spoke directly with Dr. Joshi, who added tylenol premed to plan. Feeling well today, no new complaints. Will call clinic should she experience any side effects after today's infusion.    Intravenous Access:  Peripheral IV placed.    Treatment Conditions:  Not Applicable.      Post Infusion Assessment:  Patient tolerated infusion without incident.  Patient observed for 30 minutes post injectafer per protocol.  Blood return noted pre and post infusion.  Site patent and intact, free from redness, edema or discomfort.  No evidence of extravasations.  Access discontinued per protocol.       Discharge Plan:   Discharge instructions reviewed with: Patient.  Patient and/or family verbalized understanding of discharge instructions and all questions answered.  AVS to patient via [employer].  Patient will return 3/23/20 for next appointment.   Patient discharged in stable condition accompanied by: self.  Departure Mode: Ambulatory.    Kiana Carey RN

## 2020-03-02 ENCOUNTER — HEALTH MAINTENANCE LETTER (OUTPATIENT)
Age: 35
End: 2020-03-02

## 2020-03-20 ENCOUNTER — TELEPHONE (OUTPATIENT)
Dept: ONCOLOGY | Facility: CLINIC | Age: 35
End: 2020-03-20
Payer: COMMERCIAL

## 2020-03-22 ENCOUNTER — TELEPHONE (OUTPATIENT)
Dept: ONCOLOGY | Facility: CLINIC | Age: 35
End: 2020-03-22

## 2020-03-22 NOTE — TELEPHONE ENCOUNTER
Left voicemail message for patient requesting a return call regarding scheduled appointment. Patient wants to convert to telephone visit?  Prescreening needs to be completed.

## 2020-12-20 ENCOUNTER — HEALTH MAINTENANCE LETTER (OUTPATIENT)
Age: 35
End: 2020-12-20

## 2021-02-04 ENCOUNTER — OFFICE VISIT (OUTPATIENT)
Dept: INFUSION THERAPY | Facility: CLINIC | Age: 36
End: 2021-02-04
Attending: INTERNAL MEDICINE
Payer: COMMERCIAL

## 2021-02-04 ENCOUNTER — ONCOLOGY VISIT (OUTPATIENT)
Dept: ONCOLOGY | Facility: CLINIC | Age: 36
End: 2021-02-04
Attending: INTERNAL MEDICINE
Payer: COMMERCIAL

## 2021-02-04 ENCOUNTER — HOSPITAL ENCOUNTER (OUTPATIENT)
Facility: CLINIC | Age: 36
Setting detail: SPECIMEN
Discharge: HOME | End: 2021-02-04
Attending: INTERNAL MEDICINE | Admitting: INTERNAL MEDICINE
Payer: COMMERCIAL

## 2021-02-04 VITALS
OXYGEN SATURATION: 99 % | RESPIRATION RATE: 16 BRPM | BODY MASS INDEX: 23.23 KG/M2 | HEART RATE: 66 BPM | SYSTOLIC BLOOD PRESSURE: 125 MMHG | WEIGHT: 152.8 LBS | DIASTOLIC BLOOD PRESSURE: 79 MMHG | TEMPERATURE: 98 F

## 2021-02-04 DIAGNOSIS — D50.9 IRON DEFICIENCY ANEMIA, UNSPECIFIED IRON DEFICIENCY ANEMIA TYPE: Primary | ICD-10-CM

## 2021-02-04 DIAGNOSIS — D50.9 IRON DEFICIENCY ANEMIA, UNSPECIFIED IRON DEFICIENCY ANEMIA TYPE: ICD-10-CM

## 2021-02-04 DIAGNOSIS — N92.0 MENORRHAGIA WITH REGULAR CYCLE: ICD-10-CM

## 2021-02-04 LAB
ERYTHROCYTE [DISTWIDTH] IN BLOOD BY AUTOMATED COUNT: 13 % (ref 10–15)
FERRITIN SERPL-MCNC: 18 NG/ML (ref 12–150)
HCT VFR BLD AUTO: 37.6 % (ref 35–47)
HGB BLD-MCNC: 12.1 G/DL (ref 11.7–15.7)
IRON SATN MFR SERPL: 27 % (ref 15–46)
IRON SERPL-MCNC: 75 UG/DL (ref 35–180)
MCH RBC QN AUTO: 30.6 PG (ref 26.5–33)
MCHC RBC AUTO-ENTMCNC: 32.2 G/DL (ref 31.5–36.5)
MCV RBC AUTO: 95 FL (ref 78–100)
PLATELET # BLD AUTO: 294 10E9/L (ref 150–450)
RBC # BLD AUTO: 3.95 10E12/L (ref 3.8–5.2)
TIBC SERPL-MCNC: 274 UG/DL (ref 240–430)
WBC # BLD AUTO: 5.3 10E9/L (ref 4–11)

## 2021-02-04 PROCEDURE — 82728 ASSAY OF FERRITIN: CPT | Performed by: INTERNAL MEDICINE

## 2021-02-04 PROCEDURE — G0463 HOSPITAL OUTPT CLINIC VISIT: HCPCS

## 2021-02-04 PROCEDURE — 83540 ASSAY OF IRON: CPT | Performed by: INTERNAL MEDICINE

## 2021-02-04 PROCEDURE — 99214 OFFICE O/P EST MOD 30 MIN: CPT | Performed by: INTERNAL MEDICINE

## 2021-02-04 PROCEDURE — 83550 IRON BINDING TEST: CPT | Performed by: INTERNAL MEDICINE

## 2021-02-04 PROCEDURE — 85027 COMPLETE CBC AUTOMATED: CPT | Performed by: INTERNAL MEDICINE

## 2021-02-04 PROCEDURE — 36415 COLL VENOUS BLD VENIPUNCTURE: CPT

## 2021-02-04 ASSESSMENT — PAIN SCALES - GENERAL: PAINLEVEL: NO PAIN (0)

## 2021-02-04 NOTE — PATIENT INSTRUCTIONS
1. Pelvic US.  2. Virtual visit after US.    Pt will call back to schedule. 2/4/21. She would like to speak with her OB-GYN first.

## 2021-02-04 NOTE — LETTER
"    2/4/2021         RE: Judy Argueta  [address]  [facility] [address]        Dear Colleague,    Thank you for referring your patient, Judy Argueta, to the [facility]. Please see a copy of my visit note below.    Oncology Rooming Note    February 4, 2021 9:58 AM   Judy Argueta is a 35 year old female who presents for:    Chief Complaint   Patient presents with     Oncology Clinic Visit     Initial Vitals: /79   Pulse 66   Temp 98  F (36.7  C) (Oral)   Resp 16   Wt 69.3 kg (152 lb 12.8 oz)   SpO2 99%   BMI 23.23 kg/m   Estimated body mass index is 23.23 kg/m  as calculated from the following:    Height as of 12/29/19: 1.727 m (5' 8\").    Weight as of this encounter: 69.3 kg (152 lb 12.8 oz). Body surface area is 1.82 meters squared.  No Pain (0) Comment: Data Unavailable   No LMP recorded.  Allergies reviewed: Yes  Medications reviewed: Yes    Medications: Medication refills not needed today.  Pharmacy name entered into BABADU:    [facility] [place], [place] - [address] [place] [address] DRUG STORE #[id number] [facility] [address] [facility] DRUG STORE #[id number] [facility] [address] AT [facility] [address]    Clinical concerns: no      Karo Fuller, [facility]              Visit Date:   02/04/2021     HEMATOLOGY/ONCOLOGY HISTORY:  Judy Argueta is a female with lymphadenopathy and iron deficiency anemia.     1.  Patient has a history of lymphadenopathy for many years.  At age 16, she had a lump in the right neck which was biopsied and was benign.  At the age of 18, she had a lump in the right axilla, which was biopsied and was found to be benign.  At the age of 19, she again had a lump in the right neck which was biopsied and was benign.  None of those records are available.   2.  Patient had a CT of the neck done at [facility] in [place], [place] on 11/28/2013.  It revealed cervical lymphadenopathy which had mildly " progressed from 2005 baseline.  It revealed mildly prominent cervical lymph nodes  bilaterally.  One at the right level 3 measured 1.6 x 0.6 x 4.1 cm.  The left level 3 lymph node measures 0.9 x 0.6 cm.   3.  CT of the neck, chest, abdomen and pelvis was done on 07/02/2014.  It revealed right sided level 3-4 lymph node measuring 1.5 x 0.8 x 4.2 cm.  Left sided level 2 lymph node measured up to 2 cm.  There was no lymphadenopathy or mass in the chest, abdomen and pelvis.   4.  CT of the neck was done on last 10/06/2014.  Lymph nodes had decreased slightly in size.  5. Multiple labs were done on 12/07/2017:   -Hemoglobin of 11 with an MCV of 80.  Retic of 0.7%.  Normal WBC and platelets.   -Iron of 26 with saturation of 7% and ferritin of 4.   -LDH of 130.   -TSH of 1.40.   6. She had hysteroscopy and polypectomy on 04/25/2018. Menstrual bleeding improved significantly.      SUBJECTIVE:  Ms. Argueta is a 35-year-old female with iron deficiency anemia.  This is secondary to menorrhagia.  The patient has regular menstrual bleeding every month, but she bleeds for about 10 days and they are heavy.  Previously she had a polyp, which was removed from uterus.      The patient says that lately she has been more weak and tired.  She has been having more headaches.  She also has some muscle cramps.  She also gets some easy bruising.  In the past these symptoms were associated with severe iron deficiency.      The patient in the past has tried oral iron.  She has taken oral iron pills and liquid, but she is unable to tolerate it.  She gets abdominal pain and constipation.  Also, it has not helped.  In the past she has received IV iron, which has helped her.      REVIEW OF SYSTEMS:  The patient says that her headaches have been more frequent.  At times, she gets mild lightheadedness.  No chest pain.  No shortness of breath at rest.  No abdominal pain, nausea or vomiting.  No blood in the urine or stool.  She has some muscle cramps.   All other review of systems are negative.      PHYSICAL EXAMINATION:   GENERAL:  She is alert, oriented x 3.   VITAL SIGNS:  ECOG PS of 1.   The rest of systems were not examined.      LABORATORY DATA:  Reviewed.      ASSESSMENT:   1.  A 35-year-old female with a history of iron deficiency anemia.  Her labs done today do not reveal any iron deficiency anemia.   2.  Menorrhagia.   3.  Fatigue, headache, muscle cramping and easy bruising.      PLAN:   1.  The patient has multiple different symptoms which are suggestive of iron deficiency anemia, but her labs from today did not reveal any anemia or iron deficiency.  Her ferritin is low normal. I explained to the patient, she does not have any iron deficiency or anemia.  At this time, she does not need any IV iron. I explained to her that she may become iron deficient and anemic in the future.  When that happens, we will give her IV iron.  She is agreeable for it.      2.  The patient has menorrhagia.  We will get a pelvic ultrasound.  If there is any fibroid or polyp, she will see the gynecologist. The patient is planning to have children in the next few years.      3.  She has multiple different symptoms including fatigue, headache, bruising and muscle cramps.  I advised her to see her primary care physician if they get worse.      4.  I will see her after her pelvic ultrasound.         PAULA BLUNT MD             D: 2021   T: 2021   MT: LUCAS      Name:     DAYTON MULLEN   MRN:      [id number]        Account:      [id number]   :      1985           Visit Date:   2021      Document: [id number]      This office note has been dictated.          Again, thank you for allowing me to participate in the care of your patient.        Sincerely,        Paula Blunt MD

## 2021-02-04 NOTE — LETTER
2/4/2021         RE: Judy Argueta  [address]  [facility] [address]        Dear Colleague,    Thank you for referring your patient, Judy Argueta, to the [facility]. Please see a copy of my visit note below.    Medical Assistant Note:  Judy Argueta presents today for blood draw.    Patient seen by provider today: Yes: ruben.   present during visit today: Not Applicable.    Concerns: No Concerns.    Procedure:  Lab draw site: rac, Needle type: bf, Gauge: 23.    Post Assessment:  Labs drawn without difficulty: Yes.    Discharge Plan:  Departure Mode: Ambulatory.    Face to Face Time: 5min  .    Judy Patel CMA              Again, thank you for allowing me to participate in the care of your patient.        Sincerely,         Lab Draw

## 2021-02-04 NOTE — PROGRESS NOTES
"Oncology Rooming Note    February 4, 2021 9:58 AM   Judy Argueta is a 35 year old female who presents for:    Chief Complaint   Patient presents with     Oncology Clinic Visit     Initial Vitals: /79   Pulse 66   Temp 98  F (36.7  C) (Oral)   Resp 16   Wt 69.3 kg (152 lb 12.8 oz)   SpO2 99%   BMI 23.23 kg/m   Estimated body mass index is 23.23 kg/m  as calculated from the following:    Height as of 12/29/19: 1.727 m (5' 8\").    Weight as of this encounter: 69.3 kg (152 lb 12.8 oz). Body surface area is 1.82 meters squared.  No Pain (0) Comment: Data Unavailable   No LMP recorded.  Allergies reviewed: Yes  Medications reviewed: Yes    Medications: Medication refills not needed today.  Pharmacy name entered into [facility]:    [facility] [address] DRUG STORE #[id number] [facility] [address] [facility] DRUG STORE #[id number] [facility] [address] AT [facility] [address]    Clinical concerns: no      Karo Fuller CMA            "

## 2021-02-04 NOTE — RESULT ENCOUNTER NOTE
Dear Ms. Argueta,    Your blood test is normal. No anemia. Iron and ferritin level is normal.    Please, call me with any questions.    Layo Joshi MD

## 2021-02-04 NOTE — PROGRESS NOTES
Medical Assistant Note:  Judy Argueta presents today for blood draw.    Patient seen by provider today: Yes: ruben.   present during visit today: Not Applicable.    Concerns: No Concerns.    Procedure:  Lab draw site: rac, Needle type: bf, Gauge: 23.    Post Assessment:  Labs drawn without difficulty: Yes.    Discharge Plan:  Departure Mode: Ambulatory.    Face to Face Time: 5min  .    Judy Patel, CMA

## 2021-02-07 NOTE — PROGRESS NOTES
Visit Date:   02/04/2021     HEMATOLOGY/ONCOLOGY HISTORY:  Judy Argueta is a female with lymphadenopathy and iron deficiency anemia.     1.  Patient has a history of lymphadenopathy for many years.  At age 16, she had a lump in the right neck which was biopsied and was benign.  At the age of 18, she had a lump in the right axilla, which was biopsied and was found to be benign.  At the age of 19, she again had a lump in the right neck which was biopsied and was benign.  None of those records are available.   2.  Patient had a CT of the neck done at [facility] in [place], [place] on 11/28/2013.  It revealed cervical lymphadenopathy which had mildly progressed from 2005 baseline.  It revealed mildly prominent cervical lymph nodes  bilaterally.  One at the right level 3 measured 1.6 x 0.6 x 4.1 cm.  The left level 3 lymph node measures 0.9 x 0.6 cm.   3.  CT of the neck, chest, abdomen and pelvis was done on 07/02/2014.  It revealed right sided level 3-4 lymph node measuring 1.5 x 0.8 x 4.2 cm.  Left sided level 2 lymph node measured up to 2 cm.  There was no lymphadenopathy or mass in the chest, abdomen and pelvis.   4.  CT of the neck was done on last 10/06/2014.  Lymph nodes had decreased slightly in size.  5. Multiple labs were done on 12/07/2017:   -Hemoglobin of 11 with an MCV of 80.  Retic of 0.7%.  Normal WBC and platelets.   -Iron of 26 with saturation of 7% and ferritin of 4.   -LDH of 130.   -TSH of 1.40.   6. She had hysteroscopy and polypectomy on 04/25/2018. Menstrual bleeding improved significantly.      SUBJECTIVE:  Ms. Argueta is a 35-year-old female with iron deficiency anemia.  This is secondary to menorrhagia.  The patient has regular menstrual bleeding every month, but she bleeds for about 10 days and they are heavy.  Previously she had a polyp, which was removed from uterus.      The patient says that lately she has been more weak and tired.  She has been having more headaches.  She also has some  muscle cramps.  She also gets some easy bruising.  In the past these symptoms were associated with severe iron deficiency.      The patient in the past has tried oral iron.  She has taken oral iron pills and liquid, but she is unable to tolerate it.  She gets abdominal pain and constipation.  Also, it has not helped.  In the past she has received IV iron, which has helped her.      REVIEW OF SYSTEMS:  The patient says that her headaches have been more frequent.  At times, she gets mild lightheadedness.  No chest pain.  No shortness of breath at rest.  No abdominal pain, nausea or vomiting.  No blood in the urine or stool.  She has some muscle cramps.  All other review of systems are negative.      PHYSICAL EXAMINATION:   GENERAL:  She is alert, oriented x 3.   VITAL SIGNS:  ECOG PS of 1.   The rest of systems were not examined.      LABORATORY DATA:  Reviewed.      ASSESSMENT:   1.  A 35-year-old female with a history of iron deficiency anemia.  Her labs done today do not reveal any iron deficiency anemia.   2.  Menorrhagia.   3.  Fatigue, headache, muscle cramping and easy bruising.      PLAN:   1.  The patient has multiple different symptoms which are suggestive of iron deficiency anemia, but her labs from today did not reveal any anemia or iron deficiency.  Her ferritin is low normal. I explained to the patient, she does not have any iron deficiency or anemia.  At this time, she does not need any IV iron. I explained to her that she may become iron deficient and anemic in the future.  When that happens, we will give her IV iron.  She is agreeable for it.      2.  The patient has menorrhagia.  We will get a pelvic ultrasound.  If there is any fibroid or polyp, she will see the gynecologist. The patient is planning to have children in the next few years.      3.  She has multiple different symptoms including fatigue, headache, bruising and muscle cramps.  I advised her to see her primary care physician if they get  worse.      4.  I will see her after her pelvic ultrasound.         PAULA BLUNT MD             D: 2021   T: 2021   MT: LUCAS      Name:     DAYTON MULLEN   MRN:      [id number]        Account:      [id number]   :      1985           Visit Date:   2021      Document: [id number]

## 2021-04-24 ENCOUNTER — HEALTH MAINTENANCE LETTER (OUTPATIENT)
Age: 36
End: 2021-04-24

## 2021-10-03 ENCOUNTER — HEALTH MAINTENANCE LETTER (OUTPATIENT)
Age: 36
End: 2021-10-03

## 2022-02-03 ENCOUNTER — ANCILLARY PROCEDURE (OUTPATIENT)
Dept: ULTRASOUND IMAGING | Facility: CLINIC | Age: 37
End: 2022-02-03
Attending: INTERNAL MEDICINE
Payer: COMMERCIAL

## 2022-02-03 DIAGNOSIS — N92.0 MENORRHAGIA WITH REGULAR CYCLE: ICD-10-CM

## 2022-02-03 PROCEDURE — 76830 TRANSVAGINAL US NON-OB: CPT | Performed by: OBSTETRICS & GYNECOLOGY

## 2022-02-03 PROCEDURE — 76856 US EXAM PELVIC COMPLETE: CPT | Performed by: OBSTETRICS & GYNECOLOGY

## 2022-02-03 NOTE — RESULT ENCOUNTER NOTE
Dear Ms. Argueta,    Pelvic ultrasound is good.    Please, call me with any questions.    Layo Joshi MD

## 2022-02-28 ENCOUNTER — OFFICE VISIT (OUTPATIENT)
Dept: OBGYN | Facility: CLINIC | Age: 37
End: 2022-02-28
Attending: INTERNAL MEDICINE
Payer: COMMERCIAL

## 2022-02-28 VITALS
DIASTOLIC BLOOD PRESSURE: 79 MMHG | WEIGHT: 151.01 LBS | BODY MASS INDEX: 22.96 KG/M2 | OXYGEN SATURATION: 99 % | HEART RATE: 72 BPM | SYSTOLIC BLOOD PRESSURE: 119 MMHG

## 2022-02-28 DIAGNOSIS — N94.6 DYSMENORRHEA: ICD-10-CM

## 2022-02-28 DIAGNOSIS — Z01.411 ENCOUNTER FOR GYNECOLOGICAL EXAMINATION WITH ABNORMAL FINDING: Primary | ICD-10-CM

## 2022-02-28 DIAGNOSIS — N84.0 ENDOMETRIAL POLYP: ICD-10-CM

## 2022-02-28 PROCEDURE — 99213 OFFICE O/P EST LOW 20 MIN: CPT | Mod: 25 | Performed by: OBSTETRICS & GYNECOLOGY

## 2022-02-28 PROCEDURE — 99385 PREV VISIT NEW AGE 18-39: CPT | Performed by: OBSTETRICS & GYNECOLOGY

## 2022-02-28 ASSESSMENT — PATIENT HEALTH QUESTIONNAIRE - PHQ9: SUM OF ALL RESPONSES TO PHQ QUESTIONS 1-9: 5

## 2022-02-28 NOTE — PROGRESS NOTES
Judy is a 36 year old  female who presents for annual exam.     Menses are regular q 28-30 days and crampy, heavy and cramps  lasting 7-12 days.  Menses flow: normal and heavy.  Patient's last menstrual period was 2022 (approximate).. Using none for contraception.  She is not currently considering pregnancy.  Besides routine health maintenance,  she would like to discuss talking about periods.  Hysteroscopic polylpectomy in 2018. Periods very heavy then. Improved after polypectomy. Periods were normal for about a year then got progressively heavier. Has had iron deficiency anemia from it and has a hematologist. Has needed IV iron from time to time. Periods cause pelvic pain, pelvic floor myalgia, and radiating pain down her legs.   Planning pregnancy in the upcoming 1-2 years. Her wife will try to conceive first. Planning [facility]UI at [facility].   GYNECOLOGIC HISTORY:  Menarche: [age]  Age at first intercourse: [age] Number of lifetime partners: <6  Judy is sexually active with male partner(s) and is currently in monogamous relationship with .    History sexually transmitted infections:HPV  STI testing offered?  Declined  BENNY exposure: No  History of abnormal Pap smear: YES - updated in Problem List and Health Maintenance accordingly  Family history of breast CA: No  Family history of uterine/ovarian CA: No    Family history of colon CA: No    HEALTH MAINTENANCE:  Cholesterol: (No results found for: CHOL History of abnormal lipids: No  Mammo: no . History of abnormal Mammo: YES, No.  Regular Self Breast Exams: No  Calcium/Vitamin D intake: source:  dairy, dietary supplement(s) Adequate? Yes  TSH: (  TSH   Date Value Ref Range Status   2017 1.40 0.40 - 4.00 mU/L Final    )  Pap; (  Lab Results   Component Value Date    PAP NIL 2018    PAP NIL 10/02/2014    )    HISTORY:  OB History    Para Term  AB Living   0 0 0 0 0 0   SAB IAB Ectopic Multiple Live Births   0 0 0 0 0     Past Medical  History:   Diagnosis Date     High risk HPV infection 10/02/14    HPV #16     Menorrhagia      Past Surgical History:   Procedure Laterality Date     DISSECT LYMPH NODE AXILLA       OPERATIVE HYSTEROSCOPY WITH MORCELLATOR N/A 4/25/2018    Procedure: OPERATIVE HYSTEROSCOPY WITH MORCELLATOR;  Hysteroscopic Myomectomy ;  Surgeon: Maria Dolores Kincaid MD;  Location: [facility] OR     Family History   Problem Relation Age of Onset     Hypertension Father      Lipids Father      Social History     Socioeconomic History     Marital status: Single     Spouse name: Not on file     Number of children: Not on file     Years of education: Not on file     Highest education level: Not on file   Occupational History     Not on file   Tobacco Use     Smoking status: Never Smoker     Smokeless tobacco: Never Used   Substance and Sexual Activity     Alcohol use: Yes     Comment: x1 glass of wine every couple of weeks     Drug use: No     Sexual activity: Yes     Partners: Female   Other Topics Concern     Parent/sibling w/ CABG, MI or angioplasty before 65F 55M? Not Asked   Social History Narrative     Not on file     Social Determinants of Health     Financial Resource Strain: Not on file   Food Insecurity: Not on file   Transportation Needs: Not on file   Physical Activity: Not on file   Stress: Not on file   Social Connections: Not on file   Intimate Partner Violence: Not on file   Housing Stability: Not on file       Current Outpatient Medications:      Cyanocobalamin (B-12 SUPER STRENGTH) 5000 MCG/ML LIQD, Place 1 mL under the tongue daily FOR VITAMIN B12 SUPPLEMENTATION, PLEASE PLACE UNDER THE TONGUE, Disp: 2 Bottle, Rfl: PRN     multivitamin w/minerals (MULTI-VITAMIN) tablet, Take 1 tablet by mouth daily, Disp: , Rfl:      omega-3 acid ethyl esters (LOVAZA) 1 G capsule, Take 2 g by mouth 2 times daily, Disp: , Rfl:      Sertraline HCl (ZOLOFT PO), Take 150 mg by mouth daily , Disp: , Rfl:      Allergies   Allergen Reactions      Codeine      Darvocet [Propoxyphene N-Apap]      Dilaudid [Hydromorphone]      Morphine      Oxycontin [Oxycodone]      Percocet [Oxycodone-Acetaminophen]      Sulfa Drugs      Other reaction(s): RASH/ITCHING     Vicodin [Hydrocodone-Acetaminophen]        Past medical, surgical, social and family history were reviewed and updated in EPIC.    ROS:   C:     NEGATIVE for fever, chills, change in weight  I:       NEGATIVE for worrisome rashes, moles or lesions  E:     NEGATIVE for vision changes or irritation  E/M: NEGATIVE for ear, mouth and throat problems  R:     NEGATIVE for significant cough or SOB  CV:   NEGATIVE for chest pain, palpitations or peripheral edema  GI:     NEGATIVE for nausea, abdominal pain, heartburn, or change in bowel habits  :   NEGATIVE for frequency, dysuria, hematuria, vaginal discharge, or irregular bleeding  M:     NEGATIVE for significant arthralgias or myalgia  N:      NEGATIVE for weakness, dizziness or paresthesias  E:      NEGATIVE for temperature intolerance, skin/hair changes  P:      NEGATIVE for changes in mood or affect.    EXAM:  /79   Pulse 72   Wt 68.5 kg (151 lb 0.2 oz)   LMP 02/11/2022 (Approximate)   SpO2 99%   BMI 22.96 kg/m     BMI: Body mass index is 22.96 kg/m .  Constitutional: healthy, alert and no distress  Head: Normocephalic. No masses, lesions, tenderness or abnormalities  Neck: Neck supple. Trachea midline. No adenopathy. Thyroid symmetric, normal size.   Cardiovascular: RRR.   Respiratory: Negative.   Breast: Breasts reveal mild symmetric fibrocystic densities, but there are no dominant, discrete, fixed or suspicious masses found.  Gastrointestinal: Abdomen soft, non-tender, non-distended. No masses, organomegaly.  :  Deferred. Will do EUA with upcoming hysteroscopy.   Rectal Exam: deferred  Musculoskeletal: extremities normal  Skin: no suspicious lesions or rashes  Psychiatric: Affect appropriate, cooperative,mentation appears normal.      COUNSELING:   Reviewed preventive health counseling, as reflected in patient instructions       Regular exercise       Healthy diet/nutrition       Family planning   reports that she has never smoked. She has never used smokeless tobacco.    Body mass index is 22.96 kg/m .    FRAX Risk Assessment    ASSESSMENT:  36 year old female with satisfactory annual exam  (Z01.411) Encounter for gynecological examination with abnormal finding  (primary encounter diagnosis)  Comment:   Plan: Plan pap in OR    (N84.0) Endometrial polyp  Comment:   Plan: Reviewed ultrasound report and images  Discussed hysteroscopy - has had done previously   H/o MRSA abscess in axilla in 2008. Plan MRSA swabs to remove contact precautions.    (N94.6) Dysmenorrhea  Comment:   Plan: Discussed options for management at time of hysteroscopy  Discussed potential endometriosis and could do diagnostic/therapeutic laparoscopy at time of hysteroscopy. Could do tubal dye study in preparation for trying to conceive.   Discussed IUD at time of hysteroscopy to manage menses until ready to conceive.   Will scheduled for [facility] and Judy will consider these options.     25 minutes was spent outside of time spent outside of preventive visit on chart review, discussion with patient, and documentation      Gynecological Ultrasound Report  Pelvic U/S - Transabdominal and Transvaginal   [facility]  Referring Provider: Layo Joshi MD  Sonographer:  Keisha Fuentes RDMS  Indication: Bleeding/Menses- Menometrorrhagia (heavy, irregular menses)  LMP: No LMP recorded.  History: Polyp     Gynecological Ultrasonography:   Uterus: anteverted. Contour is smooth/regular.  Size: 9.1 x 5.8 x 4.2 cm  Endometrium: Thickness Total 16.2 mm  Findings: echogenic mass with blood flow = 1.3x 1.3x 1.4cm     Right Ovary: 2.9 x 1.0 x 1.7 cm. Wnl  Left Ovary: 3.1 x 1.9 x 1.9 cm. Wnl  Cul de Sac Free Fluid: No free fluid     Impression:       The uterus and ovaries were visualized and no abnormalities were seen.  There appears to be an endometrial polyp vs submucosal fibroid     Federica Dent MD

## 2022-03-03 DIAGNOSIS — N84.0 ENDOMETRIAL POLYP: Primary | ICD-10-CM

## 2022-03-03 DIAGNOSIS — R10.2 PELVIC PAIN IN FEMALE: ICD-10-CM

## 2022-03-14 ENCOUNTER — LAB (OUTPATIENT)
Dept: INFUSION THERAPY | Facility: CLINIC | Age: 37
End: 2022-03-14
Attending: INTERNAL MEDICINE
Payer: COMMERCIAL

## 2022-03-14 DIAGNOSIS — D50.9 IRON DEFICIENCY ANEMIA, UNSPECIFIED IRON DEFICIENCY ANEMIA TYPE: ICD-10-CM

## 2022-03-14 LAB
BASOPHILS # BLD AUTO: 0 10E3/UL (ref 0–0.2)
BASOPHILS NFR BLD AUTO: 1 %
EOSINOPHIL # BLD AUTO: 0.2 10E3/UL (ref 0–0.7)
EOSINOPHIL NFR BLD AUTO: 3 %
ERYTHROCYTE [DISTWIDTH] IN BLOOD BY AUTOMATED COUNT: 13.3 % (ref 10–15)
FERRITIN SERPL-MCNC: 6 NG/ML (ref 12–150)
HCT VFR BLD AUTO: 37.2 % (ref 35–47)
HGB BLD-MCNC: 12 G/DL (ref 11.7–15.7)
IMM GRANULOCYTES # BLD: 0 10E3/UL
IMM GRANULOCYTES NFR BLD: 0 %
IRON SATN MFR SERPL: 10 % (ref 15–46)
IRON SERPL-MCNC: 33 UG/DL (ref 35–180)
LYMPHOCYTES # BLD AUTO: 1.4 10E3/UL (ref 0.8–5.3)
LYMPHOCYTES NFR BLD AUTO: 27 %
MCH RBC QN AUTO: 29 PG (ref 26.5–33)
MCHC RBC AUTO-ENTMCNC: 32.3 G/DL (ref 31.5–36.5)
MCV RBC AUTO: 90 FL (ref 78–100)
MONOCYTES # BLD AUTO: 0.4 10E3/UL (ref 0–1.3)
MONOCYTES NFR BLD AUTO: 8 %
NEUTROPHILS # BLD AUTO: 3.2 10E3/UL (ref 1.6–8.3)
NEUTROPHILS NFR BLD AUTO: 61 %
NRBC # BLD AUTO: 0 10E3/UL
NRBC BLD AUTO-RTO: 0 /100
PLATELET # BLD AUTO: 267 10E3/UL (ref 150–450)
RBC # BLD AUTO: 4.14 10E6/UL (ref 3.8–5.2)
TIBC SERPL-MCNC: 337 UG/DL (ref 240–430)
WBC # BLD AUTO: 5.3 10E3/UL (ref 4–11)

## 2022-03-14 PROCEDURE — 36415 COLL VENOUS BLD VENIPUNCTURE: CPT

## 2022-03-14 PROCEDURE — 83550 IRON BINDING TEST: CPT | Performed by: NURSE PRACTITIONER

## 2022-03-14 PROCEDURE — 82728 ASSAY OF FERRITIN: CPT | Performed by: NURSE PRACTITIONER

## 2022-03-14 PROCEDURE — 85025 COMPLETE CBC W/AUTO DIFF WBC: CPT | Performed by: NURSE PRACTITIONER

## 2022-03-14 NOTE — PROGRESS NOTES
Medical Assistant Note:  Judy Argueta presents today for lab draw.    Patient seen by provider today: No.   present during visit today: Not Applicable.    Concerns: No Concerns.    Procedure:  Lab draw site: RAC, Needle type: BF, Gauge: 21. Gauze and coban applied    Post Assessment:  Labs drawn without difficulty: Yes.    Discharge Plan:  Departure Mode: Ambulatory.    Face to Face Time: 5.    Adilia Dougherty CMA

## 2022-03-16 ENCOUNTER — ONCOLOGY VISIT (OUTPATIENT)
Dept: ONCOLOGY | Facility: CLINIC | Age: 37
End: 2022-03-16
Attending: INTERNAL MEDICINE
Payer: COMMERCIAL

## 2022-03-16 VITALS
RESPIRATION RATE: 16 BRPM | OXYGEN SATURATION: 98 % | SYSTOLIC BLOOD PRESSURE: 137 MMHG | DIASTOLIC BLOOD PRESSURE: 63 MMHG | WEIGHT: 150 LBS | HEART RATE: 72 BPM | BODY MASS INDEX: 22.81 KG/M2

## 2022-03-16 DIAGNOSIS — D50.9 IRON DEFICIENCY ANEMIA, UNSPECIFIED IRON DEFICIENCY ANEMIA TYPE: ICD-10-CM

## 2022-03-16 DIAGNOSIS — K90.9 MALABSORPTION OF IRON: Primary | ICD-10-CM

## 2022-03-16 DIAGNOSIS — T88.7XXA MEDICATION SIDE EFFECTS: ICD-10-CM

## 2022-03-16 PROCEDURE — 99214 OFFICE O/P EST MOD 30 MIN: CPT | Performed by: INTERNAL MEDICINE

## 2022-03-16 PROCEDURE — G0463 HOSPITAL OUTPT CLINIC VISIT: HCPCS

## 2022-03-16 RX ORDER — ALBUTEROL SULFATE 90 UG/1
1-2 AEROSOL, METERED RESPIRATORY (INHALATION)
Status: CANCELLED
Start: 2022-03-16

## 2022-03-16 RX ORDER — HEPARIN SODIUM (PORCINE) LOCK FLUSH IV SOLN 100 UNIT/ML 100 UNIT/ML
5 SOLUTION INTRAVENOUS
Status: CANCELLED | OUTPATIENT
Start: 2022-03-16

## 2022-03-16 RX ORDER — METHYLPREDNISOLONE SODIUM SUCCINATE 125 MG/2ML
125 INJECTION, POWDER, LYOPHILIZED, FOR SOLUTION INTRAMUSCULAR; INTRAVENOUS
Status: CANCELLED
Start: 2022-03-16

## 2022-03-16 RX ORDER — EPINEPHRINE 1 MG/ML
0.3 INJECTION, SOLUTION INTRAMUSCULAR; SUBCUTANEOUS EVERY 5 MIN PRN
Status: CANCELLED | OUTPATIENT
Start: 2022-03-16

## 2022-03-16 RX ORDER — NALOXONE HYDROCHLORIDE 0.4 MG/ML
0.2 INJECTION, SOLUTION INTRAMUSCULAR; INTRAVENOUS; SUBCUTANEOUS
Status: CANCELLED | OUTPATIENT
Start: 2022-03-16

## 2022-03-16 RX ORDER — MEPERIDINE HYDROCHLORIDE 25 MG/ML
25 INJECTION INTRAMUSCULAR; INTRAVENOUS; SUBCUTANEOUS EVERY 30 MIN PRN
Status: CANCELLED | OUTPATIENT
Start: 2022-03-16

## 2022-03-16 RX ORDER — DIPHENHYDRAMINE HYDROCHLORIDE 50 MG/ML
50 INJECTION INTRAMUSCULAR; INTRAVENOUS
Status: CANCELLED
Start: 2022-03-16

## 2022-03-16 RX ORDER — ALBUTEROL SULFATE 0.83 MG/ML
2.5 SOLUTION RESPIRATORY (INHALATION)
Status: CANCELLED | OUTPATIENT
Start: 2022-03-16

## 2022-03-16 RX ORDER — HEPARIN SODIUM,PORCINE 10 UNIT/ML
5 VIAL (ML) INTRAVENOUS
Status: CANCELLED | OUTPATIENT
Start: 2022-03-16

## 2022-03-16 ASSESSMENT — PAIN SCALES - GENERAL: PAINLEVEL: NO PAIN (0)

## 2022-03-16 NOTE — LETTER
3/16/2022         RE: Judy Argueta  [address]  [place] [address]        Dear Colleague,    Thank you for referring your patient, Judy Argueta, to the [facility] [place]. Please see a copy of my visit note below.    HEMATOLOGY/ONCOLOGY HISTORY:  Judy Argueta is a female with lymphadenopathy and iron deficiency anemia.     1.  Patient has a history of lymphadenopathy for many years.  At age 16, she had a lump in the right neck which was biopsied and was benign.  At the age of 18, she had a lump in the right axilla, which was biopsied and was found to be benign.  At the age of 19, she again had a lump in the right neck which was biopsied and was benign.  None of those records are available.   2.  Patient had a CT of the neck done at [facility] in [place], [place] on 11/28/2013.  It revealed cervical lymphadenopathy which had mildly progressed from 2005 baseline.  It revealed mildly prominent cervical lymph nodes  bilaterally.  One at the right level 3 measured 1.6 x 0.6 x 4.1 cm.  The left level 3 lymph node measures 0.9 x 0.6 cm.   3.  CT of the neck, chest, abdomen and pelvis was done on 07/02/2014.  It revealed right sided level 3-4 lymph node measuring 1.5 x 0.8 x 4.2 cm.  Left sided level 2 lymph node measured up to 2 cm.  There was no lymphadenopathy or mass in the chest, abdomen and pelvis.   4.  CT of the neck was done on last 10/06/2014.  Lymph nodes had decreased slightly in size.  5. Multiple labs were done on 12/07/2017:   -Hemoglobin of 11 with an MCV of 80.  Retic of 0.7%.  Normal WBC and platelets.   -Iron of 26 with saturation of 7% and ferritin of 4.   -LDH of 130.   -TSH of 1.40.   6. She had hysteroscopy and polypectomy on 04/25/2018. Menstrual bleeding improved significantly.      SUBJECTIVE:  Ms. Argueta is a 36-year-old female with iron deficiency anemia secondary to menorrhagia.  The patient has regular menstrual bleeding every month, but she bleeds for about  12 days and they are heavy.  Previously she had uterine polyp, which was removed from uterus.  Patient is following up with her gynecologist.  Patient mentioned that she has another polyp and may also have endometriosis.  She is scheduled for laparoscopy in June.    Lately she has not been feeling good.  She has been more weak and tired.  She has been getting more headache.  Some dizziness.  No chest pain or shortness of breath.  No nausea or vomiting. No abdominal pain.  No urinary or bowel complaint.  She has muscle weakness and cramping.  She also gets some easy bruising.  Easy bruising happens whenever she becomes iron deficient.     The patient in the past has tried oral iron.  She had side effects with oral iron. She had abdominal pain and constipation.  Also, it did not with anemia.  She does not want to take any oral iron. In the past she has received IV iron, which has helped with anemia.      PHYSICAL EXAMINATION:   GENERAL:  She is alert, oriented x 3.   The rest of systems were not examined.      LABORATORY DATA:  Reviewed.      ASSESSMENT:   1.  A 36-year-old female with iron deficiency anemia secondary to menorrhagia.    2.  Menorrhagia from uterine polyp.   3.  Fatigue, headache, muscle cramping and easy bruising.      PLAN:   1.  Labs were reviewed with the patient.  She is severely iron deficient.  This is secondary to menorrhagia.    She has symptomatic iron deficiency.  Discussed regarding treatment.  She does not tolerate oral iron and also it does not help.  We discussed regarding IV iron.  She is agreeable for it.  We will give her 2 doses of IV Injectafer.  Side effects including anaphylactic reaction, nausea, vomiting and other side effects discussed.    2.  She has menorrhagia.  She will follow up with her gynecologist.  Hopefully with removal of the polyp it will get better.    3.  I will see her in 2 months time with labs.  Advised her to call us with any questions or concerns.      Again,  thank you for allowing me to participate in the care of your patient.        Sincerely,        Layo Joshi MD

## 2022-03-16 NOTE — PROGRESS NOTES
HEMATOLOGY/ONCOLOGY HISTORY:  Judy Argueta is a female with lymphadenopathy and iron deficiency anemia.     1.  Patient has a history of lymphadenopathy for many years.  At age 16, she had a lump in the right neck which was biopsied and was benign.  At the age of 18, she had a lump in the right axilla, which was biopsied and was found to be benign.  At the age of 19, she again had a lump in the right neck which was biopsied and was benign.  None of those records are available.   2.  Patient had a CT of the neck done at [facility] in [place], [place] on 11/28/2013.  It revealed cervical lymphadenopathy which had mildly progressed from 2005 baseline.  It revealed mildly prominent cervical lymph nodes  bilaterally.  One at the right level 3 measured 1.6 x 0.6 x 4.1 cm.  The left level 3 lymph node measures 0.9 x 0.6 cm.   3.  CT of the neck, chest, abdomen and pelvis was done on 07/02/2014.  It revealed right sided level 3-4 lymph node measuring 1.5 x 0.8 x 4.2 cm.  Left sided level 2 lymph node measured up to 2 cm.  There was no lymphadenopathy or mass in the chest, abdomen and pelvis.   4.  CT of the neck was done on last 10/06/2014.  Lymph nodes had decreased slightly in size.  5. Multiple labs were done on 12/07/2017:   -Hemoglobin of 11 with an MCV of 80.  Retic of 0.7%.  Normal WBC and platelets.   -Iron of 26 with saturation of 7% and ferritin of 4.   -LDH of 130.   -TSH of 1.40.   6. She had hysteroscopy and polypectomy on 04/25/2018. Menstrual bleeding improved significantly.      SUBJECTIVE:  Ms. Argueta is a 36-year-old female with iron deficiency anemia secondary to menorrhagia.  The patient has regular menstrual bleeding every month, but she bleeds for about 12 days and they are heavy.  Previously she had uterine polyp, which was removed from uterus.  Patient is following up with her gynecologist.  Patient mentioned that she has another polyp and may also have endometriosis.  She is scheduled for laparoscopy  in June.    Lately she has not been feeling good.  She has been more weak and tired.  She has been getting more headache.  Some dizziness.  No chest pain or shortness of breath.  No nausea or vomiting. No abdominal pain.  No urinary or bowel complaint.  She has muscle weakness and cramping.  She also gets some easy bruising.  Easy bruising happens whenever she becomes iron deficient.     The patient in the past has tried oral iron.  She had side effects with oral iron. She had abdominal pain and constipation.  Also, it did not with anemia.  She does not want to take any oral iron. In the past she has received IV iron, which has helped with anemia.      PHYSICAL EXAMINATION:   GENERAL:  She is alert, oriented x 3.   The rest of systems were not examined.      LABORATORY DATA:  Reviewed.      ASSESSMENT:   1.  A 36-year-old female with iron deficiency anemia secondary to menorrhagia.    2.  Menorrhagia from uterine polyp.   3.  Fatigue, headache, muscle cramping and easy bruising.      PLAN:   1.  Labs were reviewed with the patient.  She is severely iron deficient.  This is secondary to menorrhagia.    She has symptomatic iron deficiency.  Discussed regarding treatment.  She does not tolerate oral iron and also it does not help.  We discussed regarding IV iron.  She is agreeable for it.  We will give her 2 doses of IV Injectafer.  Side effects including anaphylactic reaction, nausea, vomiting and other side effects discussed.    2.  She has menorrhagia.  She will follow up with her gynecologist.  Hopefully with removal of the polyp it will get better.    3.  I will see her in 2 months time with labs.  Advised her to call us with any questions or concerns.

## 2022-03-24 ENCOUNTER — INFUSION THERAPY VISIT (OUTPATIENT)
Dept: INFUSION THERAPY | Facility: CLINIC | Age: 37
End: 2022-03-24
Attending: INTERNAL MEDICINE
Payer: COMMERCIAL

## 2022-03-24 VITALS — HEART RATE: 64 BPM | SYSTOLIC BLOOD PRESSURE: 111 MMHG | DIASTOLIC BLOOD PRESSURE: 72 MMHG | RESPIRATION RATE: 18 BRPM

## 2022-03-24 DIAGNOSIS — K90.9 MALABSORPTION OF IRON: ICD-10-CM

## 2022-03-24 DIAGNOSIS — D50.9 IRON DEFICIENCY ANEMIA, UNSPECIFIED IRON DEFICIENCY ANEMIA TYPE: ICD-10-CM

## 2022-03-24 DIAGNOSIS — T88.7XXA MEDICATION SIDE EFFECTS: Primary | ICD-10-CM

## 2022-03-24 PROCEDURE — 250N000013 HC RX MED GY IP 250 OP 250 PS 637: Performed by: NURSE PRACTITIONER

## 2022-03-24 PROCEDURE — 250N000011 HC RX IP 250 OP 636: Performed by: INTERNAL MEDICINE

## 2022-03-24 PROCEDURE — 96365 THER/PROPH/DIAG IV INF INIT: CPT

## 2022-03-24 PROCEDURE — 258N000003 HC RX IP 258 OP 636: Performed by: INTERNAL MEDICINE

## 2022-03-24 PROCEDURE — 250N000013 HC RX MED GY IP 250 OP 250 PS 637: Performed by: INTERNAL MEDICINE

## 2022-03-24 RX ORDER — NALOXONE HYDROCHLORIDE 0.4 MG/ML
0.2 INJECTION, SOLUTION INTRAMUSCULAR; INTRAVENOUS; SUBCUTANEOUS
Status: CANCELLED | OUTPATIENT
Start: 2022-03-31

## 2022-03-24 RX ORDER — ALBUTEROL SULFATE 0.83 MG/ML
2.5 SOLUTION RESPIRATORY (INHALATION)
Status: CANCELLED | OUTPATIENT
Start: 2022-03-31

## 2022-03-24 RX ORDER — EPINEPHRINE 1 MG/ML
0.3 INJECTION, SOLUTION INTRAMUSCULAR; SUBCUTANEOUS EVERY 5 MIN PRN
Status: CANCELLED | OUTPATIENT
Start: 2022-03-31

## 2022-03-24 RX ORDER — DIPHENHYDRAMINE HYDROCHLORIDE 50 MG/ML
25 INJECTION INTRAMUSCULAR; INTRAVENOUS EVERY 6 HOURS PRN
Status: DISCONTINUED | OUTPATIENT
Start: 2022-03-24 | End: 2022-03-24 | Stop reason: CLARIF

## 2022-03-24 RX ORDER — ACETAMINOPHEN 325 MG/1
650 TABLET ORAL EVERY 4 HOURS PRN
Status: CANCELLED
Start: 2022-03-31

## 2022-03-24 RX ORDER — DIPHENHYDRAMINE HCL 25 MG
25 CAPSULE ORAL EVERY 6 HOURS PRN
Status: DISCONTINUED | OUTPATIENT
Start: 2022-03-24 | End: 2022-03-24 | Stop reason: HOSPADM

## 2022-03-24 RX ORDER — MEPERIDINE HYDROCHLORIDE 25 MG/ML
25 INJECTION INTRAMUSCULAR; INTRAVENOUS; SUBCUTANEOUS EVERY 30 MIN PRN
Status: CANCELLED | OUTPATIENT
Start: 2022-03-31

## 2022-03-24 RX ORDER — METHYLPREDNISOLONE SODIUM SUCCINATE 125 MG/2ML
125 INJECTION, POWDER, LYOPHILIZED, FOR SOLUTION INTRAMUSCULAR; INTRAVENOUS
Status: CANCELLED
Start: 2022-03-31

## 2022-03-24 RX ORDER — ALBUTEROL SULFATE 90 UG/1
1-2 AEROSOL, METERED RESPIRATORY (INHALATION)
Status: CANCELLED
Start: 2022-03-31

## 2022-03-24 RX ORDER — DIPHENHYDRAMINE HYDROCHLORIDE 50 MG/ML
25 INJECTION INTRAMUSCULAR; INTRAVENOUS EVERY 6 HOURS PRN
Status: CANCELLED
Start: 2022-03-31

## 2022-03-24 RX ORDER — HEPARIN SODIUM,PORCINE 10 UNIT/ML
5 VIAL (ML) INTRAVENOUS
Status: CANCELLED | OUTPATIENT
Start: 2022-03-31

## 2022-03-24 RX ORDER — ACETAMINOPHEN 325 MG/1
650 TABLET ORAL EVERY 4 HOURS PRN
Status: DISCONTINUED | OUTPATIENT
Start: 2022-03-24 | End: 2022-03-24 | Stop reason: HOSPADM

## 2022-03-24 RX ORDER — DIPHENHYDRAMINE HYDROCHLORIDE 50 MG/ML
50 INJECTION INTRAMUSCULAR; INTRAVENOUS
Status: CANCELLED
Start: 2022-03-31

## 2022-03-24 RX ORDER — HEPARIN SODIUM (PORCINE) LOCK FLUSH IV SOLN 100 UNIT/ML 100 UNIT/ML
5 SOLUTION INTRAVENOUS
Status: CANCELLED | OUTPATIENT
Start: 2022-03-31

## 2022-03-24 RX ADMIN — SODIUM CHLORIDE 250 ML: 9 INJECTION, SOLUTION INTRAVENOUS at 15:13

## 2022-03-24 RX ADMIN — ACETAMINOPHEN 650 MG: 325 TABLET, FILM COATED ORAL at 14:58

## 2022-03-24 RX ADMIN — FERRIC CARBOXYMALTOSE INJECTION 750 MG: 50 INJECTION, SOLUTION INTRAVENOUS at 15:13

## 2022-03-24 RX ADMIN — DIPHENHYDRAMINE HYDROCHLORIDE 25 MG: 25 CAPSULE ORAL at 14:58

## 2022-03-24 NOTE — PROGRESS NOTES
Infusion Nursing Note:  Judy ARVIN Argueta presents today for injectafer.    Patient seen by provider today: No   present during visit today: Not Applicable.    Note: pt reports having a reaction in the past, stated it was fever and chills. D/W Marcos and tylenol and benadryl ordered.      Intravenous Access:  Peripheral IV placed.    Treatment Conditions:  Not Applicable.      Post Infusion Assessment:  Patient tolerated infusion without incident.  Patient observed for 30 minutes post injectafer per protocol.  Site patent and intact, free from redness, edema or discomfort.  No evidence of extravasations.  Access discontinued per protocol.       Discharge Plan:   Discharge instructions reviewed with: Patient.  Patient and/or family verbalized understanding of discharge instructions and all questions answered.  Patient discharged in stable condition accompanied by: self.  Departure Mode: Ambulatory.      Leandra Eason RN

## 2022-03-31 ENCOUNTER — INFUSION THERAPY VISIT (OUTPATIENT)
Dept: INFUSION THERAPY | Facility: CLINIC | Age: 37
End: 2022-03-31
Attending: INTERNAL MEDICINE
Payer: COMMERCIAL

## 2022-03-31 VITALS
OXYGEN SATURATION: 99 % | DIASTOLIC BLOOD PRESSURE: 67 MMHG | TEMPERATURE: 97.9 F | SYSTOLIC BLOOD PRESSURE: 112 MMHG | HEART RATE: 73 BPM | RESPIRATION RATE: 16 BRPM

## 2022-03-31 DIAGNOSIS — D50.9 IRON DEFICIENCY ANEMIA, UNSPECIFIED IRON DEFICIENCY ANEMIA TYPE: ICD-10-CM

## 2022-03-31 DIAGNOSIS — K90.9 MALABSORPTION OF IRON: ICD-10-CM

## 2022-03-31 DIAGNOSIS — T88.7XXA MEDICATION SIDE EFFECTS: Primary | ICD-10-CM

## 2022-03-31 PROCEDURE — 258N000003 HC RX IP 258 OP 636: Performed by: INTERNAL MEDICINE

## 2022-03-31 PROCEDURE — 96365 THER/PROPH/DIAG IV INF INIT: CPT

## 2022-03-31 PROCEDURE — 250N000011 HC RX IP 250 OP 636: Performed by: INTERNAL MEDICINE

## 2022-03-31 PROCEDURE — 250N000013 HC RX MED GY IP 250 OP 250 PS 637: Performed by: INTERNAL MEDICINE

## 2022-03-31 RX ORDER — DIPHENHYDRAMINE HCL 25 MG
25 CAPSULE ORAL ONCE
Status: COMPLETED | OUTPATIENT
Start: 2022-03-31 | End: 2022-03-31

## 2022-03-31 RX ORDER — ALBUTEROL SULFATE 0.83 MG/ML
2.5 SOLUTION RESPIRATORY (INHALATION)
Status: CANCELLED | OUTPATIENT
Start: 2022-04-07

## 2022-03-31 RX ORDER — DIPHENHYDRAMINE HYDROCHLORIDE 50 MG/ML
50 INJECTION INTRAMUSCULAR; INTRAVENOUS
Status: CANCELLED
Start: 2022-04-07

## 2022-03-31 RX ORDER — DIPHENHYDRAMINE HCL 25 MG
25 CAPSULE ORAL EVERY 6 HOURS PRN
Qty: 1 CAPSULE | Refills: 0
Start: 2022-03-31 | End: 2022-03-31

## 2022-03-31 RX ORDER — HEPARIN SODIUM (PORCINE) LOCK FLUSH IV SOLN 100 UNIT/ML 100 UNIT/ML
5 SOLUTION INTRAVENOUS
Status: CANCELLED | OUTPATIENT
Start: 2022-04-07

## 2022-03-31 RX ORDER — NALOXONE HYDROCHLORIDE 0.4 MG/ML
0.2 INJECTION, SOLUTION INTRAMUSCULAR; INTRAVENOUS; SUBCUTANEOUS
Status: CANCELLED | OUTPATIENT
Start: 2022-04-07

## 2022-03-31 RX ORDER — ACETAMINOPHEN 325 MG/1
650 TABLET ORAL ONCE
Status: COMPLETED | OUTPATIENT
Start: 2022-03-31 | End: 2022-03-31

## 2022-03-31 RX ORDER — METHYLPREDNISOLONE SODIUM SUCCINATE 125 MG/2ML
125 INJECTION, POWDER, LYOPHILIZED, FOR SOLUTION INTRAMUSCULAR; INTRAVENOUS
Status: CANCELLED
Start: 2022-04-07

## 2022-03-31 RX ORDER — HEPARIN SODIUM,PORCINE 10 UNIT/ML
5 VIAL (ML) INTRAVENOUS
Status: CANCELLED | OUTPATIENT
Start: 2022-04-07

## 2022-03-31 RX ORDER — EPINEPHRINE 1 MG/ML
0.3 INJECTION, SOLUTION INTRAMUSCULAR; SUBCUTANEOUS EVERY 5 MIN PRN
Status: CANCELLED | OUTPATIENT
Start: 2022-04-07

## 2022-03-31 RX ORDER — ALBUTEROL SULFATE 90 UG/1
1-2 AEROSOL, METERED RESPIRATORY (INHALATION)
Status: CANCELLED
Start: 2022-04-07

## 2022-03-31 RX ORDER — MEPERIDINE HYDROCHLORIDE 25 MG/ML
25 INJECTION INTRAMUSCULAR; INTRAVENOUS; SUBCUTANEOUS EVERY 30 MIN PRN
Status: CANCELLED | OUTPATIENT
Start: 2022-04-07

## 2022-03-31 RX ADMIN — SODIUM CHLORIDE 250 ML: 9 INJECTION, SOLUTION INTRAVENOUS at 12:47

## 2022-03-31 RX ADMIN — ACETAMINOPHEN 650 MG: 325 TABLET, FILM COATED ORAL at 12:37

## 2022-03-31 RX ADMIN — FERRIC CARBOXYMALTOSE INJECTION 750 MG: 50 INJECTION, SOLUTION INTRAVENOUS at 13:04

## 2022-03-31 RX ADMIN — DIPHENHYDRAMINE HYDROCHLORIDE 25 MG: 25 CAPSULE ORAL at 12:40

## 2022-03-31 ASSESSMENT — PAIN SCALES - GENERAL: PAINLEVEL: NO PAIN (0)

## 2022-03-31 NOTE — PROGRESS NOTES
Infusion Nursing Note:  Judy ARVIN Argueta presents today for injectafer 2/2.    Patient seen by provider today: No   present during visit today: Not Applicable.    Note: Pt tolerated first dose of Injectafer well on 3/24/22 with premeds. premeds (tylenol and benadryl) given again today per pt's request.       Intravenous Access:  Peripheral IV placed.    Treatment Conditions:  Not Applicable.      Post Infusion Assessment:  Patient tolerated infusion without incident.  Patient observed for 30 minutes post injectafer per protocol.  Blood return noted pre and post infusion.  Site patent and intact, free from redness, edema or discomfort.  No evidence of extravasations.  Access discontinued per protocol.       Discharge Plan:   Discharge instructions reviewed with: Patient.  Patient and/or family verbalized understanding of discharge instructions and all questions answered.  AVS to patient via [employer].    Patient discharged in stable condition accompanied by: self.  Departure Mode: Ambulatory.      No Moreno RN

## 2022-04-07 ENCOUNTER — TELEPHONE (OUTPATIENT)
Dept: OBGYN | Facility: CLINIC | Age: 37
End: 2022-04-07
Payer: COMMERCIAL

## 2022-04-07 NOTE — TELEPHONE ENCOUNTER
Type of surgery: gyn  Location of surgery: [facility]  Date and time of surgery: 06/01/22 11AM  Surgeon: Sanjiv  Pre-Op Appt Date: 05/04/22 Pepper Sullivan  Post-Op Appt Date: patient will schedule later   Packet sent out: Yes  Pre-cert/Authorization completed:  Not Applicable  Date: 04/07/22     Nolvia  She/her/hers  [facility] Scheduler

## 2022-05-03 DIAGNOSIS — Z86.14 H/O METHICILLIN RESISTANT STAPHYLOCOCCUS AUREUS: Primary | ICD-10-CM

## 2022-05-04 ENCOUNTER — OFFICE VISIT (OUTPATIENT)
Dept: FAMILY MEDICINE | Facility: CLINIC | Age: 37
End: 2022-05-04
Payer: COMMERCIAL

## 2022-05-04 VITALS
HEART RATE: 70 BPM | SYSTOLIC BLOOD PRESSURE: 122 MMHG | TEMPERATURE: 97.9 F | WEIGHT: 152 LBS | OXYGEN SATURATION: 98 % | DIASTOLIC BLOOD PRESSURE: 78 MMHG | BODY MASS INDEX: 23.11 KG/M2

## 2022-05-04 DIAGNOSIS — R10.2 PELVIC PAIN IN FEMALE: ICD-10-CM

## 2022-05-04 DIAGNOSIS — N84.0 ENDOMETRIAL POLYP: ICD-10-CM

## 2022-05-04 DIAGNOSIS — Z01.818 PREOP GENERAL PHYSICAL EXAM: Primary | ICD-10-CM

## 2022-05-04 PROCEDURE — 90715 TDAP VACCINE 7 YRS/> IM: CPT | Performed by: NURSE PRACTITIONER

## 2022-05-04 PROCEDURE — 99203 OFFICE O/P NEW LOW 30 MIN: CPT | Mod: 25 | Performed by: NURSE PRACTITIONER

## 2022-05-04 PROCEDURE — 90471 IMMUNIZATION ADMIN: CPT | Performed by: NURSE PRACTITIONER

## 2022-05-04 NOTE — PROGRESS NOTES
[address]  [address]  [facility] [address]  Phone: [phone]  Fax: [phone]  Primary Provider: Layo Joshi  Pre-op Performing Provider: CHRISTINE ARANDA      PREOPERATIVE EVALUATION:  Today's date: 5/4/2022    Judy Argueta is a 36 year old female who presents for a preoperative evaluation.    Surgical Information:  Surgery/Procedure: LAPAROSCOPIC Biopsy Endomtrium, MORCELLATION, HYSTEROSCOPIC  Surgery Location: [facility]  Surgeon: Maria Dolores Kincaid MD  Surgery Date: 06/01/2022  Time of Surgery: 11 am  Where patient plans to recover: At home with family  Fax number for surgical facility: Note does not need to be faxed, will be available electronically in Epic.    Type of Anesthesia Anticipated: General    Assessment & Plan     The proposed surgical procedure is considered INTERMEDIATE risk.      ICD-10-CM    1. Preop general physical exam  Z01.818    2. Endometrial polyp  N84.0    3. Pelvic pain in female  R10.2                Risks and Recommendations:  The patient has the following additional risks and recommendations for perioperative complications:   - No identified additional risk factors other than previously addressed    Medication Instructions:   - ibuprofen (Advil, Motrin): HOLD 1 day before surgery.    - SSRIs, SNRIs, TCAs, Antipsychotics: Continue without modification.     RECOMMENDATION:  APPROVAL GIVEN to proceed with proposed procedure pending review of diagnostic evaluation.            Subjective     HPI related to upcoming procedure: polyp removed a couple years ago, dysmennorhea and will look for endometriosis, also to remove polyp    Preop Questions 5/4/2022   1. Have you ever had a heart attack or stroke? No   2. Have you ever had surgery on your heart or blood vessels, such as a stent placement, a coronary artery bypass, or surgery on an artery in your head, neck, heart, or legs? No   3. Do you have chest pain with  activity? No   4. Do you have a history of  heart failure? No   5. Do you currently have a cold, bronchitis or symptoms of other infection? No   6. Do you have a cough, shortness of breath, or wheezing? No   7. Do you or anyone in your family have previous history of blood clots? No   8. Do you or does anyone in your family have a serious bleeding problem such as prolonged bleeding following surgeries or cuts? YES - had a blood clot at 19 years old due to surgery healing at the gland in her neck, no PE  Sister had bleeding issues after wisdom teeth no issues in pregnancy and labor   9. Have you ever had problems with anemia or been told to take iron pills? YES - take infusions and had 2 infusion 1 month ago   10. Have you had any abnormal blood loss such as black, tarry or bloody stools, or abnormal vaginal bleeding? No   11. Have you ever had a blood transfusion? No   12. Are you willing to have a blood transfusion if it is medically needed before, during, or after your surgery? Yes   13. Have you or any of your relatives ever had problems with anesthesia? No   14. Do you have sleep apnea, excessive snoring or daytime drowsiness? No   15. Do you have any artifical heart valves or other implanted medical devices like a pacemaker, defibrillator, or continuous glucose monitor? No   16. Do you have artificial joints? No   17. Are you allergic to latex? No   18. Is there any chance that you may be pregnant? No       Health Care Directive:  Patient does not have a Health Care Directive or Living Will: Discussed advance care planning with patient; information given to patient to review.    Preoperative Review of :   reviewed - no record of controlled substances prescribed.      Asthma exercise induced as a child no inhaler since then   iron deficiency anemia better after iron infusions    Review of Systems  Constitutional, neuro, ENT, endocrine, pulmonary, cardiac, gastrointestinal, genitourinary, musculoskeletal,  integument and psychiatric systems are negative, except as otherwise noted.    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  1/30/18 NIL pap, neg HR HPV. Plan routine screening         Vitamin B12 deficiency without anemia 10/02/2014     Priority: Medium     Diagnosis updated by automated process. Provider to review and confirm.       Enlarged lymph nodes 08/07/2014     Priority: Medium     CERVICAL MOSTLY, SINCE AGE 16 YEARS  Problem list name updated by automated process. Provider to review       Fatigue 08/07/2014     Priority: Medium     Vitamin D deficiency 08/07/2014     Priority: Medium      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    HPV #16     Menorrhagia      Past Surgical History:   Procedure Laterality Date     DISSECT LYMPH NODE AXILLA       OPERATIVE HYSTEROSCOPY WITH MORCELLATOR N/A 4/25/2018    Procedure: OPERATIVE HYSTEROSCOPY WITH MORCELLATOR;  Hysteroscopic Myomectomy ;  Surgeon: Maria Dolores Kincaid MD;  Location: [facility] OR     Current Outpatient Medications   Medication Sig Dispense Refill     Cyanocobalamin (B-12 SUPER STRENGTH) 5000 MCG/ML LIQD Place 1 mL under the tongue daily FOR VITAMIN B12 SUPPLEMENTATION, PLEASE PLACE UNDER THE TONGUE 2 Bottle PRN     multivitamin w/minerals (MULTI-VITAMIN) tablet Take 1 tablet by mouth daily       omega-3 acid ethyl esters  (LOVAZA) 1 G capsule Take 2 g by mouth 2 times daily       Sertraline HCl (ZOLOFT PO) Take 125 mg by mouth daily          Allergies   Allergen Reactions     Codeine      Darvocet [Propoxyphene N-Apap]      Dilaudid [Hydromorphone]      Morphine      Oxycontin [Oxycodone]      Percocet [Oxycodone-Acetaminophen]      Sulfa Drugs      Other reaction(s): RASH/ITCHING     Vicodin [Hydrocodone-Acetaminophen]         Social History     Tobacco Use     Smoking status: Never Smoker     Smokeless tobacco: Never Used   Substance Use Topics     Alcohol use: Yes     Comment: x1 glass of wine every couple of weeks     Family History   Problem Relation Age of Onset     Hypertension Father      Lipids Father      History   Drug Use No         Objective     /78   Pulse 70   Temp 97.9  F (36.6  C) (Temporal)   Wt 68.9 kg (152 lb)   SpO2 98%   BMI 23.11 kg/m      Physical Exam    GENERAL APPEARANCE: healthy, alert and no distress     EYES: EOMI, PERRL     HENT: ear canals and TM's normal and nose and mouth without ulcers or lesions     NECK: no adenopathy, no asymmetry, masses, or scars and thyroid normal to palpation     RESP: lungs clear to auscultation - no rales, rhonchi or wheezes     CV: regular rates and rhythm, normal S1 S2, no S3 or S4 and no murmur, click or rub     ABDOMEN:  soft, nontender, no HSM or masses and bowel sounds normal     MS: extremities normal- no gross deformities noted, no evidence of inflammation in joints, FROM in all extremities.     SKIN: no suspicious lesions or rashes     NEURO: Normal strength and tone, sensory exam grossly normal, mentation intact and speech normal     PSYCH: mentation appears normal. and affect normal/bright     LYMPHATICS: No cervical adenopathy    Recent Labs   Lab Test 03/14/22  0938 02/04/21  0955   HGB 12.0 12.1    294        Diagnostics:  Labs pending at this time.  Results will be reviewed when available.   No EKG required, no history of coronary heart  disease, significant arrhythmia, peripheral arterial disease or other structural heart disease.    Revised Cardiac Risk Index (RCRI):  The patient has the following serious cardiovascular risks for perioperative complications:   - No serious cardiac risks = 0 points     RCRI Interpretation: 0 points: Class I (very low risk - 0.4% complication rate)           Signed Electronically by: LLOYD Padilla CNP  Copy of this evaluation report is provided to requesting physician.

## 2022-05-04 NOTE — H&P (VIEW-ONLY)
[address]  [address]  [facility] [address]  Phone: [phone]  Fax: [phone]  Primary Provider: Layo Joshi  Pre-op Performing Provider: CHRISTINE ARANDA      PREOPERATIVE EVALUATION:  Today's date: 5/4/2022    Judy Argueta is a 36 year old female who presents for a preoperative evaluation.    Surgical Information:  Surgery/Procedure: LAPAROSCOPIC Biopsy Endomtrium, MORCELLATION, HYSTEROSCOPIC  Surgery Location: [facility]  Surgeon: Maria Dolores Kincaid MD  Surgery Date: 06/01/2022  Time of Surgery: 11 am  Where patient plans to recover: At home with family  Fax number for surgical facility: Note does not need to be faxed, will be available electronically in Epic.    Type of Anesthesia Anticipated: General    Assessment & Plan     The proposed surgical procedure is considered INTERMEDIATE risk.      ICD-10-CM    1. Preop general physical exam  Z01.818    2. Endometrial polyp  N84.0    3. Pelvic pain in female  R10.2                Risks and Recommendations:  The patient has the following additional risks and recommendations for perioperative complications:   - No identified additional risk factors other than previously addressed    Medication Instructions:   - ibuprofen (Advil, Motrin): HOLD 1 day before surgery.    - SSRIs, SNRIs, TCAs, Antipsychotics: Continue without modification.     RECOMMENDATION:  APPROVAL GIVEN to proceed with proposed procedure pending review of diagnostic evaluation.            Subjective     HPI related to upcoming procedure: polyp removed a couple years ago, dysmennorhea and will look for endometriosis, also to remove polyp    Preop Questions 5/4/2022   1. Have you ever had a heart attack or stroke? No   2. Have you ever had surgery on your heart or blood vessels, such as a stent placement, a coronary artery bypass, or surgery on an artery in your head, neck, heart, or legs? No   3. Do you have chest pain with  activity? No   4. Do you have a history of  heart failure? No   5. Do you currently have a cold, bronchitis or symptoms of other infection? No   6. Do you have a cough, shortness of breath, or wheezing? No   7. Do you or anyone in your family have previous history of blood clots? No   8. Do you or does anyone in your family have a serious bleeding problem such as prolonged bleeding following surgeries or cuts? YES - had a blood clot at 19 years old due to surgery healing at the gland in her neck, no PE  Sister had bleeding issues after wisdom teeth no issues in pregnancy and labor   9. Have you ever had problems with anemia or been told to take iron pills? YES - take infusions and had 2 infusion 1 month ago   10. Have you had any abnormal blood loss such as black, tarry or bloody stools, or abnormal vaginal bleeding? No   11. Have you ever had a blood transfusion? No   12. Are you willing to have a blood transfusion if it is medically needed before, during, or after your surgery? Yes   13. Have you or any of your relatives ever had problems with anesthesia? No   14. Do you have sleep apnea, excessive snoring or daytime drowsiness? No   15. Do you have any artifical heart valves or other implanted medical devices like a pacemaker, defibrillator, or continuous glucose monitor? No   16. Do you have artificial joints? No   17. Are you allergic to latex? No   18. Is there any chance that you may be pregnant? No       Health Care Directive:  Patient does not have a Health Care Directive or Living Will: Discussed advance care planning with patient; information given to patient to review.    Preoperative Review of :   reviewed - no record of controlled substances prescribed.      Asthma exercise induced as a child no inhaler since then   iron deficiency anemia better after iron infusions    Review of Systems  Constitutional, neuro, ENT, endocrine, pulmonary, cardiac, gastrointestinal, genitourinary, musculoskeletal,  integument and psychiatric systems are negative, except as otherwise noted.    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  1/30/18 NIL pap, neg HR HPV. Plan routine screening         Vitamin B12 deficiency without anemia 10/02/2014     Priority: Medium     Diagnosis updated by automated process. Provider to review and confirm.       Enlarged lymph nodes 08/07/2014     Priority: Medium     CERVICAL MOSTLY, SINCE AGE 16 YEARS  Problem list name updated by automated process. Provider to review       Fatigue 08/07/2014     Priority: Medium     Vitamin D deficiency 08/07/2014     Priority: Medium      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    HPV #16     Menorrhagia      Past Surgical History:   Procedure Laterality Date     DISSECT LYMPH NODE AXILLA       OPERATIVE HYSTEROSCOPY WITH MORCELLATOR N/A 4/25/2018    Procedure: OPERATIVE HYSTEROSCOPY WITH MORCELLATOR;  Hysteroscopic Myomectomy ;  Surgeon: Maria Dolores Kincaid MD;  Location: [facility]     Current Outpatient Medications   Medication Sig Dispense Refill     Cyanocobalamin (B-12 SUPER STRENGTH) 5000 MCG/ML LIQD Place 1 mL under the tongue daily FOR VITAMIN B12 SUPPLEMENTATION, PLEASE PLACE UNDER THE TONGUE 2 Bottle PRN     multivitamin w/minerals (MULTI-VITAMIN) tablet Take 1 tablet by mouth daily       omega-3 acid ethyl esters  (LOVAZA) 1 G capsule Take 2 g by mouth 2 times daily       Sertraline HCl (ZOLOFT PO) Take 125 mg by mouth daily          Allergies   Allergen Reactions     Codeine      Darvocet [Propoxyphene N-Apap]      Dilaudid [Hydromorphone]      Morphine      Oxycontin [Oxycodone]      Percocet [Oxycodone-Acetaminophen]      Sulfa Drugs      Other reaction(s): RASH/ITCHING     Vicodin [Hydrocodone-Acetaminophen]         Social History     Tobacco Use     Smoking status: Never Smoker     Smokeless tobacco: Never Used   Substance Use Topics     Alcohol use: Yes     Comment: x1 glass of wine every couple of weeks     Family History   Problem Relation Age of Onset     Hypertension Father      Lipids Father      History   Drug Use No         Objective     /78   Pulse 70   Temp 97.9  F (36.6  C) (Temporal)   Wt 68.9 kg (152 lb)   SpO2 98%   BMI 23.11 kg/m      Physical Exam    GENERAL APPEARANCE: healthy, alert and no distress     EYES: EOMI, PERRL     HENT: ear canals and TM's normal and nose and mouth without ulcers or lesions     NECK: no adenopathy, no asymmetry, masses, or scars and thyroid normal to palpation     RESP: lungs clear to auscultation - no rales, rhonchi or wheezes     CV: regular rates and rhythm, normal S1 S2, no S3 or S4 and no murmur, click or rub     ABDOMEN:  soft, nontender, no HSM or masses and bowel sounds normal     MS: extremities normal- no gross deformities noted, no evidence of inflammation in joints, FROM in all extremities.     SKIN: no suspicious lesions or rashes     NEURO: Normal strength and tone, sensory exam grossly normal, mentation intact and speech normal     PSYCH: mentation appears normal. and affect normal/bright     LYMPHATICS: No cervical adenopathy    Recent Labs   Lab Test 03/14/22  0938 02/04/21  0955   HGB 12.0 12.1    294        Diagnostics:  Labs pending at this time.  Results will be reviewed when available.   No EKG required, no history of coronary heart  disease, significant arrhythmia, peripheral arterial disease or other structural heart disease.    Revised Cardiac Risk Index (RCRI):  The patient has the following serious cardiovascular risks for perioperative complications:   - No serious cardiac risks = 0 points     RCRI Interpretation: 0 points: Class I (very low risk - 0.4% complication rate)           Signed Electronically by: LLOYD Padilla CNP  Copy of this evaluation report is provided to requesting physician.

## 2022-05-13 ENCOUNTER — LAB (OUTPATIENT)
Dept: INFUSION THERAPY | Facility: CLINIC | Age: 37
End: 2022-05-13
Attending: INTERNAL MEDICINE
Payer: COMMERCIAL

## 2022-05-13 DIAGNOSIS — D50.9 IRON DEFICIENCY ANEMIA, UNSPECIFIED IRON DEFICIENCY ANEMIA TYPE: ICD-10-CM

## 2022-05-13 LAB
ERYTHROCYTE [DISTWIDTH] IN BLOOD BY AUTOMATED COUNT: 15.2 % (ref 10–15)
FERRITIN SERPL-MCNC: 179 NG/ML (ref 12–150)
HCT VFR BLD AUTO: 40.9 % (ref 35–47)
HGB BLD-MCNC: 13.4 G/DL (ref 11.7–15.7)
IRON SATN MFR SERPL: 36 % (ref 15–46)
IRON SERPL-MCNC: 82 UG/DL (ref 35–180)
MCH RBC QN AUTO: 30.9 PG (ref 26.5–33)
MCHC RBC AUTO-ENTMCNC: 32.8 G/DL (ref 31.5–36.5)
MCV RBC AUTO: 94 FL (ref 78–100)
PLATELET # BLD AUTO: 254 10E3/UL (ref 150–450)
RBC # BLD AUTO: 4.34 10E6/UL (ref 3.8–5.2)
TIBC SERPL-MCNC: 226 UG/DL (ref 240–430)
WBC # BLD AUTO: 5.5 10E3/UL (ref 4–11)

## 2022-05-13 PROCEDURE — 85027 COMPLETE CBC AUTOMATED: CPT | Performed by: INTERNAL MEDICINE

## 2022-05-13 PROCEDURE — 36415 COLL VENOUS BLD VENIPUNCTURE: CPT

## 2022-05-13 PROCEDURE — 82728 ASSAY OF FERRITIN: CPT | Performed by: INTERNAL MEDICINE

## 2022-05-13 PROCEDURE — 83550 IRON BINDING TEST: CPT | Performed by: INTERNAL MEDICINE

## 2022-05-13 NOTE — PROGRESS NOTES
Medical Assistant Note:  Judy Argueta presents today for blood  draw.    Patient seen by provider today: No.   present during visit today: Not Applicable.    Concerns: No Concerns.    Procedure:  Lab draw site: RAC, Needle type: BF, Gauge: 23g.    Post Assessment:  Labs drawn without difficulty: Yes.    Discharge Plan:  Departure Mode: Ambulatory.    Face to Face Time: 5.    Karo Fuller, CMA          
02-Oct-2019 12:22

## 2022-05-13 NOTE — RESULT ENCOUNTER NOTE
Dear Ms. Argueta,    Blood tests are good. No anemia or iron deficiency.    Please, call me with any questions.    Layo Joshi MD

## 2022-05-16 ENCOUNTER — ONCOLOGY VISIT (OUTPATIENT)
Dept: ONCOLOGY | Facility: CLINIC | Age: 37
End: 2022-05-16
Attending: INTERNAL MEDICINE
Payer: COMMERCIAL

## 2022-05-16 VITALS
RESPIRATION RATE: 16 BRPM | DIASTOLIC BLOOD PRESSURE: 90 MMHG | SYSTOLIC BLOOD PRESSURE: 128 MMHG | BODY MASS INDEX: 23.08 KG/M2 | OXYGEN SATURATION: 100 % | HEART RATE: 68 BPM | WEIGHT: 151.8 LBS

## 2022-05-16 DIAGNOSIS — Z86.14 H/O METHICILLIN RESISTANT STAPHYLOCOCCUS AUREUS: ICD-10-CM

## 2022-05-16 DIAGNOSIS — D50.9 IRON DEFICIENCY ANEMIA, UNSPECIFIED IRON DEFICIENCY ANEMIA TYPE: Primary | ICD-10-CM

## 2022-05-16 PROCEDURE — 87081 CULTURE SCREEN ONLY: CPT | Performed by: OBSTETRICS & GYNECOLOGY

## 2022-05-16 PROCEDURE — 99213 OFFICE O/P EST LOW 20 MIN: CPT | Performed by: INTERNAL MEDICINE

## 2022-05-16 PROCEDURE — G0463 HOSPITAL OUTPT CLINIC VISIT: HCPCS

## 2022-05-16 ASSESSMENT — PAIN SCALES - GENERAL: PAINLEVEL: NO PAIN (0)

## 2022-05-16 NOTE — LETTER
5/16/2022         RE: Judy Argueta  [address]  [facility] [address]        Dear Colleague,    Thank you for referring your patient, Judy Argueta, to the [facility] [place]. Please see a copy of my visit note below.    HEMATOLOGY/ONCOLOGY HISTORY:  Judy Argueta is a female with lymphadenopathy and iron deficiency anemia.     1.  Patient has a history of lymphadenopathy for many years.  At age 16, she had a lump in the right neck which was biopsied and was benign.  At the age of 18, she had a lump in the right axilla, which was biopsied and was found to be benign.  At the age of 19, she again had a lump in the right neck which was biopsied and was benign.  None of those records are available.   2.  Patient had a CT of the neck done at [facility] in [place], [place] on 11/28/2013.  It revealed cervical lymphadenopathy which had mildly progressed from 2005 baseline.  It revealed mildly prominent cervical lymph nodes  bilaterally.  One at the right level 3 measured 1.6 x 0.6 x 4.1 cm.  The left level 3 lymph node measures 0.9 x 0.6 cm.   3.  CT of the neck, chest, abdomen and pelvis was done on 07/02/2014.  It revealed right sided level 3-4 lymph node measuring 1.5 x 0.8 x 4.2 cm.  Left sided level 2 lymph node measured up to 2 cm.  There was no lymphadenopathy or mass in the chest, abdomen and pelvis.   4.  CT of the neck was done on last 10/06/2014.  Lymph nodes had decreased slightly in size.  5. On 12/07/2017:   -Hemoglobin of 11 with an MCV of 80.  Retic of 0.7%.  Normal WBC and platelets.   -Iron of 26 with saturation of 7% and ferritin of 4.   6. She had hysteroscopy and polypectomy on 04/25/2018. Menstrual bleeding improved significantly.  7.  On 03/14/2022, iron of 33 and ferritin of 6.  -Patient received IV Injectafer in March 2022.  -On 05/13/2022, iron of 82 and ferritin of 179.     SUBJECTIVE:  Ms. Argueta is a 36-year-old female with iron deficiency anemia secondary to  menorrhagia.  The patient has regular menstrual bleeding every month but bleeds for about 12 days and they are heavy.  She passes clots.  Patient following up with gynecologist and is scheduled for uterine polyp removal.  She will also have a diagnostic laparoscopy to look for endometriosis.    Patient received IV Injectafer in March 22.  She tolerated it well.  She feels much better.  No excessive fatigue.  No headache.  No dizziness.  No chest pain.  No shortness of breath at rest.  No nausea or vomiting.  Appetite is good.  No urinary or bowel complaint.  She continues to have menorrhagia.  All other review of system negative.    PHYSICAL EXAMINATION:   GENERAL:  She is alert, oriented x 3.   The rest of systems were not examined.      LABORATORY DATA: CBC, iron and ferritin reviewed.       ASSESSMENT:   1.  A 36-year-old female with iron deficiency anemia secondary to menorrhagia.  Iron deficiency anemia has resolved with IV Injectafer.  2.  Menorrhagia from uterine polyp.      PLAN:   1.  Patient is doing well.  IV iron has helped.  She is asymptomatic.    2.  Labs were reviewed.  Explained to her that hemoglobin is normal.  Iron and ferritin are elevated because of IV Injectafer.    Patient is at risk of becoming anemic and iron deficient again because of ongoing menorrhagia.  We will continue to monitor her.  In 3 to 4 months time, we will recheck labs including CBC, iron and ferritin.  Told the patient to call us sooner if she has symptoms of iron deficiency anemia.    Patient will be having uterine polyp removed.  That should help with menorrhagia.     3.   She had few questions which were all answered. I will see her in 3-4 months time with labs.  Advised her to call us with any questions or concerns.      Again, thank you for allowing me to participate in the care of your patient.        Sincerely,        Layo Joshi MD

## 2022-05-16 NOTE — PROGRESS NOTES
HEMATOLOGY/ONCOLOGY HISTORY:  Judy Argueta is a female with lymphadenopathy and iron deficiency anemia.     1.  Patient has a history of lymphadenopathy for many years.  At age 16, she had a lump in the right neck which was biopsied and was benign.  At the age of 18, she had a lump in the right axilla, which was biopsied and was found to be benign.  At the age of 19, she again had a lump in the right neck which was biopsied and was benign.  None of those records are available.   2.  Patient had a CT of the neck done at [facility] in [place], [place] on 11/28/2013.  It revealed cervical lymphadenopathy which had mildly progressed from 2005 baseline.  It revealed mildly prominent cervical lymph nodes  bilaterally.  One at the right level 3 measured 1.6 x 0.6 x 4.1 cm.  The left level 3 lymph node measures 0.9 x 0.6 cm.   3.  CT of the neck, chest, abdomen and pelvis was done on 07/02/2014.  It revealed right sided level 3-4 lymph node measuring 1.5 x 0.8 x 4.2 cm.  Left sided level 2 lymph node measured up to 2 cm.  There was no lymphadenopathy or mass in the chest, abdomen and pelvis.   4.  CT of the neck was done on last 10/06/2014.  Lymph nodes had decreased slightly in size.  5. On 12/07/2017:   -Hemoglobin of 11 with an MCV of 80.  Retic of 0.7%.  Normal WBC and platelets.   -Iron of 26 with saturation of 7% and ferritin of 4.   6. She had hysteroscopy and polypectomy on 04/25/2018. Menstrual bleeding improved significantly.  7.  On 03/14/2022, iron of 33 and ferritin of 6.  -Patient received IV Injectafer in March 2022.  -On 05/13/2022, iron of 82 and ferritin of 179.     SUBJECTIVE:  Ms. Argueta is a 36-year-old female with iron deficiency anemia secondary to menorrhagia.  The patient has regular menstrual bleeding every month but bleeds for about 12 days and they are heavy.  She passes clots.  Patient following up with gynecologist and is scheduled for uterine polyp removal.  She will also have a diagnostic  laparoscopy to look for endometriosis.    Patient received IV Injectafer in March 22.  She tolerated it well.  She feels much better.  No excessive fatigue.  No headache.  No dizziness.  No chest pain.  No shortness of breath at rest.  No nausea or vomiting.  Appetite is good.  No urinary or bowel complaint.  She continues to have menorrhagia.  All other review of system negative.    PHYSICAL EXAMINATION:   GENERAL:  She is alert, oriented x 3.   The rest of systems were not examined.      LABORATORY DATA: CBC, iron and ferritin reviewed.       ASSESSMENT:   1.  A 36-year-old female with iron deficiency anemia secondary to menorrhagia.  Iron deficiency anemia has resolved with IV Injectafer.  2.  Menorrhagia from uterine polyp.      PLAN:   1.  Patient is doing well.  IV iron has helped.  She is asymptomatic.    2.  Labs were reviewed.  Explained to her that hemoglobin is normal.  Iron and ferritin are elevated because of IV Injectafer.    Patient is at risk of becoming anemic and iron deficient again because of ongoing menorrhagia.  We will continue to monitor her.  In 3 to 4 months time, we will recheck labs including CBC, iron and ferritin.  Told the patient to call us sooner if she has symptoms of iron deficiency anemia.    Patient will be having uterine polyp removed.  That should help with menorrhagia.     3.   She had few questions which were all answered. I will see her in 3-4 months time with labs.  Advised her to call us with any questions or concerns.

## 2022-05-18 LAB
BACTERIA SPEC CULT: NORMAL
BACTERIA SPEC CULT: NORMAL

## 2022-05-20 DIAGNOSIS — Z11.59 ENCOUNTER FOR SCREENING FOR OTHER VIRAL DISEASES: Primary | ICD-10-CM

## 2022-05-25 DIAGNOSIS — Z01.818 PRE-OP EXAM: Primary | ICD-10-CM

## 2022-05-25 DIAGNOSIS — N84.0 ENDOMETRIAL POLYP: ICD-10-CM

## 2022-05-31 ENCOUNTER — LAB (OUTPATIENT)
Dept: LAB | Facility: CLINIC | Age: 37
End: 2022-05-31
Payer: COMMERCIAL

## 2022-05-31 DIAGNOSIS — N84.0 ENDOMETRIAL POLYP: ICD-10-CM

## 2022-05-31 DIAGNOSIS — Z01.818 PRE-OP EXAM: Primary | ICD-10-CM

## 2022-05-31 DIAGNOSIS — Z11.59 ENCOUNTER FOR SCREENING FOR OTHER VIRAL DISEASES: ICD-10-CM

## 2022-05-31 DIAGNOSIS — Z01.818 PRE-OP EXAM: ICD-10-CM

## 2022-05-31 DIAGNOSIS — Z86.14 HISTORY OF MRSA INFECTION: ICD-10-CM

## 2022-05-31 LAB
ABO/RH(D): NORMAL
ANTIBODY SCREEN: NEGATIVE
ERYTHROCYTE [DISTWIDTH] IN BLOOD BY AUTOMATED COUNT: 14.9 % (ref 10–15)
HCT VFR BLD AUTO: 40.8 % (ref 35–47)
HGB BLD-MCNC: 13.6 G/DL (ref 11.7–15.7)
MCH RBC QN AUTO: 31.6 PG (ref 26.5–33)
MCHC RBC AUTO-ENTMCNC: 33.3 G/DL (ref 31.5–36.5)
MCV RBC AUTO: 95 FL (ref 78–100)
PLATELET # BLD AUTO: 227 10E3/UL (ref 150–450)
RBC # BLD AUTO: 4.3 10E6/UL (ref 3.8–5.2)
SARS-COV-2 RNA RESP QL NAA+PROBE: NEGATIVE
SPECIMEN EXPIRATION DATE: NORMAL
WBC # BLD AUTO: 5.2 10E3/UL (ref 4–11)

## 2022-05-31 PROCEDURE — 36415 COLL VENOUS BLD VENIPUNCTURE: CPT

## 2022-05-31 PROCEDURE — 85027 COMPLETE CBC AUTOMATED: CPT

## 2022-05-31 PROCEDURE — 86850 RBC ANTIBODY SCREEN: CPT

## 2022-05-31 PROCEDURE — 86900 BLOOD TYPING SEROLOGIC ABO: CPT

## 2022-05-31 PROCEDURE — U0003 INFECTIOUS AGENT DETECTION BY NUCLEIC ACID (DNA OR RNA); SEVERE ACUTE RESPIRATORY SYNDROME CORONAVIRUS 2 (SARS-COV-2) (CORONAVIRUS DISEASE [COVID-19]), AMPLIFIED PROBE TECHNIQUE, MAKING USE OF HIGH THROUGHPUT TECHNOLOGIES AS DESCRIBED BY CMS-2020-01-R: HCPCS

## 2022-05-31 PROCEDURE — 86901 BLOOD TYPING SEROLOGIC RH(D): CPT

## 2022-05-31 PROCEDURE — 87081 CULTURE SCREEN ONLY: CPT | Performed by: OBSTETRICS & GYNECOLOGY

## 2022-05-31 PROCEDURE — U0005 INFEC AGEN DETEC AMPLI PROBE: HCPCS

## 2022-06-01 ENCOUNTER — ANESTHESIA EVENT (OUTPATIENT)
Dept: SURGERY | Facility: CLINIC | Age: 37
End: 2022-06-01
Payer: COMMERCIAL

## 2022-06-01 ENCOUNTER — ANESTHESIA (OUTPATIENT)
Dept: SURGERY | Facility: CLINIC | Age: 37
End: 2022-06-01
Payer: COMMERCIAL

## 2022-06-01 ENCOUNTER — HOSPITAL ENCOUNTER (OUTPATIENT)
Facility: CLINIC | Age: 37
Discharge: HOME | End: 2022-06-01
Attending: OBSTETRICS & GYNECOLOGY | Admitting: OBSTETRICS & GYNECOLOGY
Payer: COMMERCIAL

## 2022-06-01 VITALS
TEMPERATURE: 98.2 F | RESPIRATION RATE: 15 BRPM | DIASTOLIC BLOOD PRESSURE: 74 MMHG | SYSTOLIC BLOOD PRESSURE: 113 MMHG | WEIGHT: 153 LBS | BODY MASS INDEX: 23.19 KG/M2 | HEIGHT: 68 IN | OXYGEN SATURATION: 97 % | HEART RATE: 77 BPM

## 2022-06-01 DIAGNOSIS — Z98.890 STATUS POST HYSTEROSCOPIC POLYPECTOMY: Primary | ICD-10-CM

## 2022-06-01 PROBLEM — J30.9 ALLERGIC RHINITIS: Status: ACTIVE | Noted: 2017-07-06

## 2022-06-01 PROBLEM — J45.909 ASTHMA: Status: ACTIVE | Noted: 2022-06-01

## 2022-06-01 LAB
GLUCOSE BLDC GLUCOMTR-MCNC: 93 MG/DL (ref 70–99)
HCG UR QL: NEGATIVE

## 2022-06-01 PROCEDURE — 710N000012 HC RECOVERY PHASE 2, PER MINUTE: Performed by: OBSTETRICS & GYNECOLOGY

## 2022-06-01 PROCEDURE — 250N000025 HC SEVOFLURANE, PER MIN: Performed by: OBSTETRICS & GYNECOLOGY

## 2022-06-01 PROCEDURE — 88305 TISSUE EXAM BY PATHOLOGIST: CPT | Mod: 26 | Performed by: PATHOLOGY

## 2022-06-01 PROCEDURE — 82962 GLUCOSE BLOOD TEST: CPT

## 2022-06-01 PROCEDURE — 88305 TISSUE EXAM BY PATHOLOGIST: CPT | Mod: TC | Performed by: OBSTETRICS & GYNECOLOGY

## 2022-06-01 PROCEDURE — 258N000003 HC RX IP 258 OP 636: Performed by: NURSE ANESTHETIST, CERTIFIED REGISTERED

## 2022-06-01 PROCEDURE — 250N000011 HC RX IP 250 OP 636: Performed by: NURSE ANESTHETIST, CERTIFIED REGISTERED

## 2022-06-01 PROCEDURE — 250N000009 HC RX 250: Performed by: NURSE ANESTHETIST, CERTIFIED REGISTERED

## 2022-06-01 PROCEDURE — 58350 REOPEN FALLOPIAN TUBE: CPT | Mod: GC | Performed by: OBSTETRICS & GYNECOLOGY

## 2022-06-01 PROCEDURE — 49320 DIAG LAPARO SEPARATE PROC: CPT | Mod: GC | Performed by: OBSTETRICS & GYNECOLOGY

## 2022-06-01 PROCEDURE — 710N000010 HC RECOVERY PHASE 1, LEVEL 2, PER MIN: Performed by: OBSTETRICS & GYNECOLOGY

## 2022-06-01 PROCEDURE — 81025 URINE PREGNANCY TEST: CPT | Performed by: OBSTETRICS & GYNECOLOGY

## 2022-06-01 PROCEDURE — 250N000011 HC RX IP 250 OP 636: Performed by: OBSTETRICS & GYNECOLOGY

## 2022-06-01 PROCEDURE — 58558 HYSTEROSCOPY BIOPSY: CPT | Mod: GC | Performed by: OBSTETRICS & GYNECOLOGY

## 2022-06-01 PROCEDURE — 250N000009 HC RX 250: Performed by: OBSTETRICS & GYNECOLOGY

## 2022-06-01 PROCEDURE — 370N000017 HC ANESTHESIA TECHNICAL FEE, PER MIN: Performed by: OBSTETRICS & GYNECOLOGY

## 2022-06-01 PROCEDURE — 999N000141 HC STATISTIC PRE-PROCEDURE NURSING ASSESSMENT: Performed by: OBSTETRICS & GYNECOLOGY

## 2022-06-01 PROCEDURE — 258N000001 HC RX 258: Performed by: OBSTETRICS & GYNECOLOGY

## 2022-06-01 PROCEDURE — 272N000001 HC OR GENERAL SUPPLY STERILE: Performed by: OBSTETRICS & GYNECOLOGY

## 2022-06-01 PROCEDURE — 360N000076 HC SURGERY LEVEL 3, PER MIN: Performed by: OBSTETRICS & GYNECOLOGY

## 2022-06-01 PROCEDURE — 250N000013 HC RX MED GY IP 250 OP 250 PS 637: Performed by: OBSTETRICS & GYNECOLOGY

## 2022-06-01 PROCEDURE — G0145 SCR C/V CYTO,THINLAYER,RESCR: HCPCS | Performed by: OBSTETRICS & GYNECOLOGY

## 2022-06-01 PROCEDURE — 87624 HPV HI-RISK TYP POOLED RSLT: CPT | Performed by: OBSTETRICS & GYNECOLOGY

## 2022-06-01 RX ORDER — FENTANYL CITRATE 50 UG/ML
INJECTION, SOLUTION INTRAMUSCULAR; INTRAVENOUS PRN
Status: DISCONTINUED | OUTPATIENT
Start: 2022-06-01 | End: 2022-06-01

## 2022-06-01 RX ORDER — IBUPROFEN 800 MG/1
800 TABLET, FILM COATED ORAL EVERY 6 HOURS PRN
Qty: 30 TABLET | Refills: 0 | COMMUNITY
Start: 2022-06-01 | End: 2023-12-18

## 2022-06-01 RX ORDER — OXYCODONE HYDROCHLORIDE 5 MG/1
5 TABLET ORAL EVERY 4 HOURS PRN
Status: DISCONTINUED | OUTPATIENT
Start: 2022-06-01 | End: 2022-06-01 | Stop reason: HOSPADM

## 2022-06-01 RX ORDER — FENTANYL CITRATE 50 UG/ML
25 INJECTION, SOLUTION INTRAMUSCULAR; INTRAVENOUS
Status: DISCONTINUED | OUTPATIENT
Start: 2022-06-01 | End: 2022-06-01 | Stop reason: HOSPADM

## 2022-06-01 RX ORDER — ONDANSETRON 2 MG/ML
INJECTION INTRAMUSCULAR; INTRAVENOUS PRN
Status: DISCONTINUED | OUTPATIENT
Start: 2022-06-01 | End: 2022-06-01

## 2022-06-01 RX ORDER — MEPERIDINE HYDROCHLORIDE 25 MG/ML
12.5 INJECTION INTRAMUSCULAR; INTRAVENOUS; SUBCUTANEOUS
Status: DISCONTINUED | OUTPATIENT
Start: 2022-06-01 | End: 2022-06-01 | Stop reason: HOSPADM

## 2022-06-01 RX ORDER — CEFAZOLIN SODIUM/WATER 2 G/20 ML
2 SYRINGE (ML) INTRAVENOUS
Status: COMPLETED | OUTPATIENT
Start: 2022-06-01 | End: 2022-06-01

## 2022-06-01 RX ORDER — FENTANYL CITRATE 50 UG/ML
25 INJECTION, SOLUTION INTRAMUSCULAR; INTRAVENOUS EVERY 5 MIN PRN
Status: DISCONTINUED | OUTPATIENT
Start: 2022-06-01 | End: 2022-06-01 | Stop reason: HOSPADM

## 2022-06-01 RX ORDER — LIDOCAINE HYDROCHLORIDE 20 MG/ML
INJECTION, SOLUTION INFILTRATION; PERINEURAL PRN
Status: DISCONTINUED | OUTPATIENT
Start: 2022-06-01 | End: 2022-06-01

## 2022-06-01 RX ORDER — KETOROLAC TROMETHAMINE 30 MG/ML
INJECTION, SOLUTION INTRAMUSCULAR; INTRAVENOUS PRN
Status: DISCONTINUED | OUTPATIENT
Start: 2022-06-01 | End: 2022-06-01

## 2022-06-01 RX ORDER — ACETAMINOPHEN 325 MG/1
975 TABLET ORAL ONCE
Status: DISCONTINUED | OUTPATIENT
Start: 2022-06-01 | End: 2022-06-01 | Stop reason: HOSPADM

## 2022-06-01 RX ORDER — HYDROMORPHONE HCL IN WATER/PF 6 MG/30 ML
0.2 PATIENT CONTROLLED ANALGESIA SYRINGE INTRAVENOUS EVERY 5 MIN PRN
Status: DISCONTINUED | OUTPATIENT
Start: 2022-06-01 | End: 2022-06-01 | Stop reason: HOSPADM

## 2022-06-01 RX ORDER — ONDANSETRON 2 MG/ML
4 INJECTION INTRAMUSCULAR; INTRAVENOUS EVERY 30 MIN PRN
Status: DISCONTINUED | OUTPATIENT
Start: 2022-06-01 | End: 2022-06-01 | Stop reason: HOSPADM

## 2022-06-01 RX ORDER — ONDANSETRON 4 MG/1
4 TABLET, ORALLY DISINTEGRATING ORAL EVERY 30 MIN PRN
Status: DISCONTINUED | OUTPATIENT
Start: 2022-06-01 | End: 2022-06-01 | Stop reason: HOSPADM

## 2022-06-01 RX ORDER — DIPHENHYDRAMINE HYDROCHLORIDE 50 MG/ML
INJECTION INTRAMUSCULAR; INTRAVENOUS PRN
Status: DISCONTINUED | OUTPATIENT
Start: 2022-06-01 | End: 2022-06-01

## 2022-06-01 RX ORDER — MAGNESIUM HYDROXIDE 1200 MG/15ML
LIQUID ORAL PRN
Status: DISCONTINUED | OUTPATIENT
Start: 2022-06-01 | End: 2022-06-01 | Stop reason: HOSPADM

## 2022-06-01 RX ORDER — PROPOFOL 10 MG/ML
INJECTION, EMULSION INTRAVENOUS PRN
Status: DISCONTINUED | OUTPATIENT
Start: 2022-06-01 | End: 2022-06-01

## 2022-06-01 RX ORDER — DEXAMETHASONE SODIUM PHOSPHATE 4 MG/ML
INJECTION, SOLUTION INTRA-ARTICULAR; INTRALESIONAL; INTRAMUSCULAR; INTRAVENOUS; SOFT TISSUE PRN
Status: DISCONTINUED | OUTPATIENT
Start: 2022-06-01 | End: 2022-06-01

## 2022-06-01 RX ORDER — SODIUM CHLORIDE, SODIUM LACTATE, POTASSIUM CHLORIDE, CALCIUM CHLORIDE 600; 310; 30; 20 MG/100ML; MG/100ML; MG/100ML; MG/100ML
INJECTION, SOLUTION INTRAVENOUS CONTINUOUS PRN
Status: DISCONTINUED | OUTPATIENT
Start: 2022-06-01 | End: 2022-06-01

## 2022-06-01 RX ORDER — SODIUM CHLORIDE, SODIUM LACTATE, POTASSIUM CHLORIDE, CALCIUM CHLORIDE 600; 310; 30; 20 MG/100ML; MG/100ML; MG/100ML; MG/100ML
INJECTION, SOLUTION INTRAVENOUS CONTINUOUS
Status: DISCONTINUED | OUTPATIENT
Start: 2022-06-01 | End: 2022-06-01 | Stop reason: HOSPADM

## 2022-06-01 RX ORDER — CEFAZOLIN SODIUM/WATER 2 G/20 ML
2 SYRINGE (ML) INTRAVENOUS SEE ADMIN INSTRUCTIONS
Status: DISCONTINUED | OUTPATIENT
Start: 2022-06-01 | End: 2022-06-01 | Stop reason: HOSPADM

## 2022-06-01 RX ADMIN — MIDAZOLAM 2 MG: 1 INJECTION INTRAMUSCULAR; INTRAVENOUS at 11:37

## 2022-06-01 RX ADMIN — FENTANYL CITRATE 50 MCG: 50 INJECTION, SOLUTION INTRAMUSCULAR; INTRAVENOUS at 13:13

## 2022-06-01 RX ADMIN — LIDOCAINE HYDROCHLORIDE 80 MG: 20 INJECTION, SOLUTION INFILTRATION; PERINEURAL at 11:48

## 2022-06-01 RX ADMIN — SUGAMMADEX 150 MG: 100 INJECTION, SOLUTION INTRAVENOUS at 12:55

## 2022-06-01 RX ADMIN — DIPHENHYDRAMINE HYDROCHLORIDE 12.5 MG: 50 INJECTION, SOLUTION INTRAMUSCULAR; INTRAVENOUS at 12:00

## 2022-06-01 RX ADMIN — Medication 2 G: at 11:55

## 2022-06-01 RX ADMIN — DEXAMETHASONE SODIUM PHOSPHATE 4 MG: 4 INJECTION, SOLUTION INTRAMUSCULAR; INTRAVENOUS at 12:00

## 2022-06-01 RX ADMIN — PROPOFOL 150 MG: 10 INJECTION, EMULSION INTRAVENOUS at 11:48

## 2022-06-01 RX ADMIN — ACETAMINOPHEN 975 MG: 325 TABLET, FILM COATED ORAL at 09:40

## 2022-06-01 RX ADMIN — FENTANYL CITRATE 50 MCG: 50 INJECTION, SOLUTION INTRAMUSCULAR; INTRAVENOUS at 13:06

## 2022-06-01 RX ADMIN — ONDANSETRON 4 MG: 2 INJECTION INTRAMUSCULAR; INTRAVENOUS at 12:55

## 2022-06-01 RX ADMIN — ROCURONIUM BROMIDE 40 MG: 50 INJECTION, SOLUTION INTRAVENOUS at 11:48

## 2022-06-01 RX ADMIN — SODIUM CHLORIDE, POTASSIUM CHLORIDE, SODIUM LACTATE AND CALCIUM CHLORIDE: 600; 310; 30; 20 INJECTION, SOLUTION INTRAVENOUS at 11:37

## 2022-06-01 RX ADMIN — FENTANYL CITRATE 50 MCG: 50 INJECTION, SOLUTION INTRAMUSCULAR; INTRAVENOUS at 11:48

## 2022-06-01 RX ADMIN — KETOROLAC TROMETHAMINE 30 MG: 30 INJECTION, SOLUTION INTRAMUSCULAR at 12:55

## 2022-06-01 NOTE — ANESTHESIA POSTPROCEDURE EVALUATION
Patient: Judy Argueta    Procedure: Procedure(s):  MORCELLATION, HYSTEROSCOPIC, Pap Smear  Laparoscopic tubal dye study, Pap smear       Anesthesia Type:  General    Note:  Disposition: Outpatient   Postop Pain Control: Uneventful            Sign Out: Well controlled pain   PONV: No   Neuro/Psych: Uneventful            Sign Out: Acceptable/Baseline neuro status   Airway/Respiratory: Uneventful            Sign Out: Acceptable/Baseline resp. status   CV/Hemodynamics: Uneventful            Sign Out: Acceptable CV status; No obvious hypovolemia; No obvious fluid overload   Other NRE:    DID A NON-ROUTINE EVENT OCCUR?            Last vitals:  Vitals Value Taken Time   /69 06/01/22 1345   Temp 36.1  C (97  F) 06/01/22 1315   Pulse 67 06/01/22 1346   Resp 9 06/01/22 1346   SpO2 99 % 06/01/22 1346   Vitals shown include unvalidated device data.    Electronically Signed By: Angela Bassett MD  June 1, 2022  1:47 PM

## 2022-06-01 NOTE — DISCHARGE INSTRUCTIONS
Same-Day Surgery   Adult Discharge Orders & Instructions     For 24 hours after surgery:  Get plenty of rest.  A responsible adult must stay with you for at least 24 hours after you leave the hospital.   Pain medication can slow your reflexes. Do not drive or use heavy equipment.  If you have weakness or tingling, don't drive or use heavy equipment until this feeling goes away.  Mixing alcohol and pain medication can cause dizziness and slow your breathing. It can even be fatal. Do not drink alcohol while taking pain medication.  Avoid strenuous or risky activities.  Ask for help when climbing stairs.   You may feel lightheaded.  If so, sit for a few minutes before standing.  Have someone help you get up.   If you have nausea (feel sick to your stomach), drink only clear liquids such as apple juice, ginger ale, broth or 7-Up.  Rest may also help.  Be sure to drink enough fluids.  Move to a regular diet as you feel able. Take pain medications with a small amount of solid food, such as toast or crackers, to avoid nausea.   A slight fever is normal. Call the doctor if your fever is over 100 F (37.7 C) (taken under the tongue) or lasts longer than 24 hours.  You may have a dry mouth, muscle aches, trouble sleeping or a sore throat.  These symptoms should go away after 24 hours.  Do not make important or legal decisions.   Pain Management:      1. Take pain medication (if prescribed) for pain as directed by your physician.        2. WARNING: If the pain medication you have been prescribed contains Tylenol  (acetaminophen), DO NOT take additional doses of Tylenol (acetaminophen).     Call your doctor for any of the followin.  Signs of infection (fever, growing tenderness at the surgery site, severe pain, a large amount of drainage or bleeding, foul-smelling drainage, redness, swelling).    2.  It has been over 8 to 10 hours since surgery and you are still not able to urinate (pee).    3.  Headache for over 24  hours.    4.  Numbness, tingling or weakness the day after surgery (if you had spinal anesthesia).  To contact a doctor, call Dr. Maria Dolores Kincaid, OB/GYN,  [phone]  or:  '   [phone] and ask for the Resident On Call for:          ASHIA (answered 24 hours a day)  '   Emergency Department:  [facility]: [phone]  [facility]: [phone]              What happens after hysteroscopy?  You may have cramps and bleeding for 24 hours after the procedure. This is normal. Use pads instead of tampons.  Do not douche or use tampons until your health care provider says it s OK.  Do not use any vaginal medicines until you are told it s OK.  Ask your health care provider when it s OK to have sex again.  When should I call my health care provider?  Call your health care provider if you have:  Heavy bleeding (more than 1 pad an hour for 2 or more hours)  A fever over 100.4 F (38.0 C)  Increasing abdominal pain or tenderness  Foul-smelling discharge  Follow-up care  Schedule a follow-up visit with your health care provider. Based on the results of your test, you may need more treatment. Be sure to follow instructions and keep your appointments.    Important numbers  [facility] ([address]) [facility]: [phone]   [facility] ([address]) : [phone]      [id number] [employer]. [address] [place] [address], [place] [address]. All rights reserved. This information is not intended as a substitute for professional medical care. Always follow your healthcare professional's instructions.

## 2022-06-01 NOTE — ANESTHESIA PREPROCEDURE EVALUATION
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    HPV #16     Menorrhagia       Past Surgical History:   Procedure Laterality Date     DISSECT LYMPH NODE AXILLA       OPERATIVE HYSTEROSCOPY WITH MORCELLATOR N/A 2018    Procedure: OPERATIVE HYSTEROSCOPY WITH MORCELLATOR;  Hysteroscopic Myomectomy ;  Surgeon: Maria Dolores Kincaid MD;  Location: [facility] OR      Allergies   Allergen Reactions     Codeine      Darvocet [Propoxyphene N-Apap]      Dilaudid [Hydromorphone]      Morphine      Oxycontin [Oxycodone]      Percocet [Oxycodone-Acetaminophen]      Sulfa Drugs      Other reaction(s): RASH/ITCHING     Vicodin [Hydrocodone-Acetaminophen]       Social History     Tobacco Use     Smoking status: Never Smoker     Smokeless tobacco: Never Used   Substance Use Topics     Alcohol use: Yes     Comment: x1 glass of wine every couple of weeks      Wt Readings from Last 1 Encounters:   22 68.9 kg (151 lb 12.8 oz)        Anesthesia Evaluation   Pt has had prior anesthetic. Type: MAC.        ROS/MED HX  ENT/Pulmonary:  - neg pulmonary ROS     Neurologic:  - neg neurologic ROS     Cardiovascular:  - neg cardiovascular ROS     METS/Exercise Tolerance:     Hematologic:  - neg hematologic  ROS     Musculoskeletal:  - neg musculoskeletal ROS     GI/Hepatic:  - neg GI/hepatic ROS     Renal/Genitourinary:  - neg Renal ROS     Endo:  - neg endo ROS     Psychiatric/Substance Use:  - neg psychiatric ROS     Infectious Disease:  - neg infectious disease ROS     Malignancy:  - neg malignancy ROS     Other:  - neg other ROS          Physical Exam    Airway        Mallampati: II   TM distance: > 3 FB   Neck ROM: full   Mouth opening: > 3 cm    Respiratory Devices and Support         Dental  no notable dental  history         Cardiovascular   cardiovascular exam normal          Pulmonary   pulmonary exam normal                OUTSIDE LABS:  CBC:   Lab Results   Component Value Date    WBC 5.2 05/31/2022    WBC 5.5 05/13/2022    HGB 13.6 05/31/2022    HGB 13.4 05/13/2022    HCT 40.8 05/31/2022    HCT 40.9 05/13/2022     05/31/2022     05/13/2022     BMP:   Lab Results   Component Value Date     12/29/2019     08/07/2014    POTASSIUM 3.4 12/29/2019    POTASSIUM 3.9 08/07/2014    CHLORIDE 109 12/29/2019    CHLORIDE 104 08/07/2014    CO2 22 12/29/2019    CO2 29 08/07/2014    BUN 11 12/29/2019    BUN 9 08/07/2014    CR 0.62 12/29/2019    CR 0.74 08/07/2014     (H) 12/29/2019    GLC 88 04/25/2018     COAGS: No results found for: PTT, INR, FIBR  POC:   Lab Results   Component Value Date    HCG Negative 04/25/2018     HEPATIC:   Lab Results   Component Value Date    ALBUMIN 3.7 12/29/2019    PROTTOTAL 6.9 12/29/2019    ALT 18 12/29/2019    AST 16 12/29/2019    ALKPHOS 52 12/29/2019    BILITOTAL 0.6 12/29/2019     OTHER:   Lab Results   Component Value Date    ABHISHEK 8.2 (L) 12/29/2019    LIPASE 74 12/29/2019    TSH 1.40 12/07/2017    T4 1.03 07/01/2016    SED 7 08/07/2014       Anesthesia Plan    ASA Status:  2   NPO Status:  NPO Appropriate    Anesthesia Type: General.     - Airway: ETT   Induction: Intravenous.   Maintenance: Balanced.        Consents    Anesthesia Plan(s) and associated risks, benefits, and realistic alternatives discussed. Questions answered and patient/representative(s) expressed understanding.    - Discussed:     - Discussed with:  Patient      - Extended Intubation/Ventilatory Support Discussed: No.      - Patient is DNR/DNI Status: No    Use of blood products discussed: No .     Postoperative Care       PONV prophylaxis: Ondansetron (or other 5HT-3)     Comments:    Other Comments: GETTA with std monitors            Angela Bassett MD

## 2022-06-01 NOTE — BRIEF OP NOTE
[facility]    Brief Operative Note    Pre-operative diagnosis: Endometrial polyp [N84.0]  Pelvic pain in female [R10.2]  Post-operative diagnosis Same as pre-operative diagnosis    Procedure: Procedure(s):  MORCELLATION, HYSTEROSCOPIC, Pap Smear  Laparoscopic tubal dye study  Surgeon: Surgeon(s) and Role:     * Maria Dolores Kincaid MD - Primary     * Qi Ryan MD - Resident - Assisting  Anesthesia: General   Estimated Blood Loss: Less than 10 ml    Drains: None  Specimens:   ID Type Source Tests Collected by Time Destination   1 :  Brushing Cervix GYNECOLOGIC CYTOLOGY Maria Dolores Kincaid MD 6/1/2022 11:46 AM    2 :  Polyp Endometrium SURGICAL PATHOLOGY EXAM Maria Dolores Kincaid MD 6/1/2022 12:55 PM      Findings:   Globular appearing uterus, normal ovaries and tubes with methylene blue extravasation from both tubes on tubal dye study. Hysteroscopy notable for two polyps.  Complications: None.  Implants: * No implants in log *      Qi Ryan MD  Ob/Gyn Resident, PGY-1  06/01/2022 1:10 PM

## 2022-06-01 NOTE — ANESTHESIA CARE TRANSFER NOTE
Patient: Judy Argueta    Procedure: Procedure(s):  MORCELLATION, HYSTEROSCOPIC, Pap Smear  Laparoscopic tubal dye study, Pap smear       Diagnosis: Endometrial polyp [N84.0]  Pelvic pain in female [R10.2]  Diagnosis Additional Information: No value filed.    Anesthesia Type:   General     Note:    Oropharynx: oropharynx clear of all foreign objects and spontaneously breathing  Level of Consciousness: awake and drowsy  Oxygen Supplementation: face mask  Level of Supplemental Oxygen (L/min / FiO2): 6  Independent Airway: airway patency satisfactory and stable  Dentition: dentition unchanged  Vital Signs Stable: post-procedure vital signs reviewed and stable  Report to RN Given: handoff report given  Patient transferred to: PACU    Handoff Report: Identifed the Patient, Identified the Reponsible Provider, Reviewed the pertinent medical history, Discussed the surgical course, Reviewed Intra-OP anesthesia mangement and issues during anesthesia, Set expectations for post-procedure period and Allowed opportunity for questions and acknowledgement of understanding      Vitals:  Vitals Value Taken Time   /69 06/01/22 1310   Temp     Pulse 79 06/01/22 1315   Resp 18 06/01/22 1315   SpO2 100 % 06/01/22 1315   Vitals shown include unvalidated device data.    Electronically Signed By: LLOYD Isaac CRNA  June 1, 2022  1:17 PM

## 2022-06-01 NOTE — OP NOTE
[facility]  Gynecology Operative Note    Patient: Judy Argueta  : 1985  MRN: [id number]    Date of Service: 2022    Pre-operative diagnosis:  1. AUB-P  2. Suspected endometriosis    Post-operative diagnosis:  1. Same, s/p procedure    Procedure:   1. Exam under anesthesia  2. Pap smear  3. Diagnostic laparoscopy  4. Tubal dye study  5. Dilation and Curettage  6. Hysteroscopy polypectomy    Surgeon: Maria Dolores Kincaid MD  Assistants: Qi Ryan MD, PGY-1    Anesthesia: MAC    EBL: 10 mL  Urine: 100 mL  IV Fluids: 400 mL crystalloid  Fluid deficit: 290 mL normal saline    Specimens: Endometrial curettings    Complications: none apparent    Findings: EUA revealed anteverted uterus, 8 week size, mobile with no adnexal masses. Cervix not dilated.   - On laparoscopy, no signs of trauma on entry, no adhesions, grossly normal liver, gallbladder, spleen, and stomach. Slightly globular appearing uterus, normal bilateral fallopian tubes and ovaries. Methylene blue dye extravasation from bilateral fallopian tubes. 2-3 dark brown, bleeding lesions noted on bladder serosa suspicious for endometriosis lesions  - Hysteroscopic exam revealed two polyps. Otherwise normal endometrial and cervical canal and bilateral ostia. Fluid deficit 290 mL normal saline. Tenaculum sites hemostatic at end of case.     Indications: Judy Argueta is a 36 year old female with history of iron deficiency anemia and AUB-P s/p polypectomy in 2018 who presents with ongoing AUB. Pelvic ultrasound 2/3/2022 revealed intrauterine polyp vs submucosal fibroid. Hysteroscopy dilation and curettage was recommended. Risks, benefits, and alternatives to the procedure were discussed. The patient's questions were answered, understanding confirmed, and the patient signed written informed consent.    Technique: The patient was taken to the operating room where she was placed in the dorsal lithotomy position with Pillo  elmo. She underwent MAC anesthesia. An exam under anesthesia was performed with findings noted above. The patient was prepped and draped in the usual sterile fashion. A surgical time out was performed. A speculum was placed and the cervix visualized. A pap smear was collected. A srivastava catheter was placed. A uterine manipulator was passed through the cervix.     Attention was turned to the upper abdomen. The skin near the umbilicus was infiltrated with 1% lidocaine. A stab incision was made at the level of the umbilicus. A 5 mm Fios balloon trocar was used to enter the abdomen under direct visualization without difficulty. The abdomen was insufflated with an opening pressure of 0 mmHg. The laparoscope was assembled and inserted into the abdominal cavity. Survey of the abdomen and pelvis was performed with the above noted findings.  Under direct visualization, a left lower quadrant 5 mm port was placed. The patient was placed in Trendelenburg. Given the location of the suspected endometriosis lesions directly on the anterior peritoneum of the bladder, the decision was made not to biopsy these lesions.    Attention was turned to tubal dye study. Methylene blue was injected through the uterine manipulator and was noted to extravasate quickly through both fallopian tubes.     Attention was returned to the cervix. The uterine manipulator was removed and the anterior lip of the cervix was grasped with a single tooth tenaculum. A paracervical block was performed at 4- and 8-o'clock using a total of 20 cc 1% lidocaine. The cervix was carefully dilated to 6 mm with sequential Godinez dilators. The hysteroscope was placed easily through the cervix into the uterine cavity with findings noted above.The reciprocating hysteroscopic morcellator was used to remove the polyps with good success. The hysteroscope apparatus was removed and total of 290 mL fluid deficit of normal saline noted. The curettings were sent to pathology. The  tenaculum was removed from the cervix and good hemostasis was achieved with pressure. All instruments were removed from the vagina at the conclusion of the case.    Instrument counts were correct x2. Dr. Kincaid was present and scrubbed for the entire procedure. The patient was awoken in the OR and transferred to the PACU in stable condition.    Qi Ryan MD  Ob/Gyn Resident, PGY-1  06/01/2022 3:52 PM     Physician Attestation   I was present for the entire procedure between opening and closing.    Maria Dolores Kincaid MD  Date of Service (when I saw the patient): 6/1/22

## 2022-06-02 LAB
BACTERIA SPEC CULT: NORMAL
BACTERIA SPEC CULT: NORMAL
PATH REPORT.COMMENTS IMP SPEC: NORMAL
PATH REPORT.FINAL DX SPEC: NORMAL
PATH REPORT.GROSS SPEC: NORMAL
PATH REPORT.MICROSCOPIC SPEC OTHER STN: NORMAL
PATH REPORT.RELEVANT HX SPEC: NORMAL
PHOTO IMAGE: NORMAL

## 2022-06-03 LAB
BKR LAB AP GYN ADEQUACY: NORMAL
BKR LAB AP GYN INTERPRETATION: NORMAL
BKR LAB AP LMP: NORMAL
PATH REPORT.COMMENTS IMP SPEC: NORMAL
PATH REPORT.COMMENTS IMP SPEC: NORMAL

## 2022-06-07 LAB
HUMAN PAPILLOMA VIRUS 16 DNA: NEGATIVE
HUMAN PAPILLOMA VIRUS 18 DNA: NEGATIVE
HUMAN PAPILLOMA VIRUS FINAL DIAGNOSIS: NORMAL
HUMAN PAPILLOMA VIRUS OTHER HR: NEGATIVE

## 2022-06-08 ENCOUNTER — OFFICE VISIT (OUTPATIENT)
Dept: OBGYN | Facility: CLINIC | Age: 37
End: 2022-06-08
Payer: COMMERCIAL

## 2022-06-08 VITALS
SYSTOLIC BLOOD PRESSURE: 102 MMHG | HEART RATE: 59 BPM | BODY MASS INDEX: 22.76 KG/M2 | WEIGHT: 149.7 LBS | DIASTOLIC BLOOD PRESSURE: 75 MMHG

## 2022-06-08 DIAGNOSIS — N80.03 ADENOMYOSIS: ICD-10-CM

## 2022-06-08 DIAGNOSIS — N80.9 ENDOMETRIOSIS: Primary | ICD-10-CM

## 2022-06-08 PROCEDURE — 99024 POSTOP FOLLOW-UP VISIT: CPT | Performed by: OBSTETRICS & GYNECOLOGY

## 2022-06-08 NOTE — PATIENT INSTRUCTIONS
Edmond Kim - I couldn't find a handout on adenomyosis, but that's the term we discussed that can make the uterus larger.    Dr. Kincaid

## 2022-06-08 NOTE — PROGRESS NOTES
Assessment & Plan     Endometriosis  Discussed findings from laparoscopy  Tubes instantly patent with dye study  A few small blebs of apparent endometriosis on the bladder peritoneum. Did not biopsy due to location    Adenomyosis  Discussed boggy appearing uterus on laparoscopy and possible adenomyosis.   Patient already considering hysterectomy after childbearing due to symptoms with period      Review of the result(s) of each unique test - surgical pathology, pap             No follow-ups on file.    Maria Dolores Kincaid MD  [facility] ORALIA Kim is a 36 year old who presents for the following health issues     HPI   Post-op check from 6/1/22  S/p diagnostic laparoscopy, tubal dye study, and hysteroscopic polypectomy.   Doing well with recovery.   Considering pregnancy soon.   First couple of days was hard due to narcotic allergies and avoiding narcotics, but doing well now.     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    HPV #16     Menorrhagia        Past Surgical History:   Procedure Laterality Date     DISSECT LYMPH NODE AXILLA       LAPAROSCOPIC TUBAL DYE STUDY Bilateral 6/1/2022    Procedure: Laparoscopic tubal dye study, Pap smear;  Surgeon: Maria Dolores Kincaid MD;  [facility]     OPERATIVE HYSTEROSCOPY WITH MORCELLATOR N/A 4/25/2018    Procedure: OPERATIVE HYSTEROSCOPY WITH MORCELLATOR;  Hysteroscopic Myomectomy ;  Surgeon: Maria Dolores Kincaid MD;  [facility]     OPERATIVE HYSTEROSCOPY WITH MORCELLATOR N/A 6/1/2022    Procedure: MORCELLATION, HYSTEROSCOPIC, Pap Smear;  Surgeon: Maria Dolores Kincaid MD;  [facility]       Family History   Problem Relation Age of Onset     Hypertension Father      Lipids Father        Social History     Socioeconomic History     Marital status: Single     Spouse name: Not on file     Number of children: Not on file     Years of education: Not on file     Highest education  level: Not on file   Occupational History     Not on file   Tobacco Use     Smoking status: Never Smoker     Smokeless tobacco: Never Used   Substance and Sexual Activity     Alcohol use: Yes     Comment: x1 glass of wine every couple of weeks     Drug use: No     Sexual activity: Yes     Partners: Female   Other Topics Concern     Parent/sibling w/ CABG, MI or angioplasty before 65F 55M? Not Asked   Social History Narrative     Not on file     Social Determinants of Health     Financial Resource Strain: Not on file   Food Insecurity: Not on file   Transportation Needs: Not on file   Physical Activity: Not on file   Stress: Not on file   Social Connections: Not on file   Intimate Partner Violence: Not on file   Housing Stability: Not on file       Current Outpatient Medications   Medication     Cyanocobalamin (B-12 SUPER STRENGTH) 5000 MCG/ML LIQD     ibuprofen (ADVIL/MOTRIN) 800 MG tablet     multivitamin w/minerals (THERA-VIT-M) tablet     Omega-3 Fatty Acids (FISH OIL OMEGA-3 PO)     Sertraline HCl (ZOLOFT PO)     No current facility-administered medications for this visit.          Allergies   Allergen Reactions     Codeine Itching     Significant full body itching, not breathing difficulties or hives.     Darvocet [Propoxyphene N-Apap] Itching     Significant full body itching, not breathing difficulties or hives.  Takes tylenol with no issues.     Dilaudid [Hydromorphone] Itching     Significant full body itching, not breathing difficulties or hives.     Morphine Itching     Significant full body itching, not breathing difficulties or hives.     Oxycontin [Oxycodone] Itching     Significant full body itching, not breathing difficulties or hives.     Percocet [Oxycodone-Acetaminophen] Itching     Significant full body itching, not breathing difficulties or hives. Takes Tylenol with no issues.     Sulfa Drugs Other (See Comments)     Other reaction(s): RASH/ITCHING     Vicodin [Hydrocodone-Acetaminophen] Itching      Significant full body itching, not breathing difficulties or hives.  Takes tylenol with no issues         Review of Systems   Constitutional, HEENT, cardiovascular, pulmonary, gi and gu systems are negative, except as otherwise noted.      Objective    /75   Pulse 59   Wt 67.9 kg (149 lb 11.2 oz)   LMP 05/31/2022 (Exact Date)   BMI 22.76 kg/m    Body mass index is 22.76 kg/m .  Physical Exam   GENERAL: healthy, alert and no distress  ABDOMEN: soft, nontender, no hepatosplenomegaly, no masses and bowel sounds normal  MS: no gross musculoskeletal defects noted, no edema  SKIN: no suspicious lesions or rashes. Incisions well healed.     Surgical Pathology Report                         Case: [id number]                                   Authorizing Provider:  Maria Dolores Kincaid,   Collected:           06/01/2022 12:55 PM                                  MD                                                                            [facility]     [facility]                 Received:            06/01/2022 01:32 PM           Pathologist:           Brennan Cyr MD                                                              Specimen:    Endometrium, Endometrial polyp                                                             Final Diagnosis   Uterus, hysteroscopic morcellation:  - Benign endometrial polyp(s), negative for atypia or malignancy.             Component      Latest Ref Rng & Units 6/1/2022   Other HR HPV       Negative   HPV 16 DNA       Negative   HPV 18 DNA       Negative   Final Diagnosis       This result contains rich text formatting which cannot be displayed here.

## 2022-09-10 ENCOUNTER — HEALTH MAINTENANCE LETTER (OUTPATIENT)
Age: 37
End: 2022-09-10

## 2022-12-28 NOTE — PROGRESS NOTES
Infusion Nursing Note:  Judy Argueta presents today for injectafer.    Patient seen by provider today: No   present during visit today: Not Applicable.    Note: N/A.    Intravenous Access:  Peripheral IV placed.    Treatment Conditions:  Not Applicable.      Post Infusion Assessment:  Patient tolerated infusion without incident.  Patient observed for 30 minutes post injectafer per protocol.  Blood return noted pre and post infusion.  Site patent and intact, free from redness, edema or discomfort.  No evidence of extravasations.  Access discontinued per protocol.    Discharge Plan:   Discharge instructions reviewed with: Patient.  Patient and/or family verbalized understanding of discharge instructions and all questions answered.  AVS to patient via [employer].  Patient will return 1/3/18 for next appointment.   Patient discharged in stable condition accompanied by: self.  Departure Mode: Ambulatory.    ARPIT Ledesma RN (discharge)                     78

## 2023-02-13 ENCOUNTER — MYC MEDICAL ADVICE (OUTPATIENT)
Dept: ONCOLOGY | Facility: CLINIC | Age: 38
End: 2023-02-13
Payer: COMMERCIAL

## 2023-02-13 DIAGNOSIS — F43.10 PTSD (POST-TRAUMATIC STRESS DISORDER): Primary | ICD-10-CM

## 2023-02-16 RX ORDER — SERTRALINE HYDROCHLORIDE 25 MG/1
25 TABLET, FILM COATED ORAL DAILY
Qty: 30 TABLET | Refills: 5 | Status: SHIPPED | OUTPATIENT
Start: 2023-02-16

## 2023-02-16 RX ORDER — SERTRALINE HYDROCHLORIDE 100 MG/1
100 TABLET, FILM COATED ORAL DAILY
Qty: 30 TABLET | Refills: 5 | Status: SHIPPED | OUTPATIENT
Start: 2023-02-16

## 2023-04-30 ENCOUNTER — HEALTH MAINTENANCE LETTER (OUTPATIENT)
Age: 38
End: 2023-04-30

## 2023-08-10 NOTE — LETTER
12/5/2018         RE: Judy Argueta  [address]  [facility] [id number]        Dear Colleague,    Thank you for referring your patient, Judy Argueta, to the [facility]. Please see a copy of my visit note below.    Medical Assistant Note:  Judy Argueta presents today for lab visit.    Patient seen by provider today: Yes: Dr. Joshi.   present during visit today: Not Applicable.    Concerns: No Concerns.    Procedure:  Lab draw site: RAC, Needle type: BF, Gauge: 21 g gauze and coban applied.    Post Assessment:  Labs drawn without difficulty: Yes.    Discharge Plan:  Departure Mode: Ambulatory.    Face to Face Time: 4.    Ava Palmer MA              Again, thank you for allowing me to participate in the care of your patient.        Sincerely,        Oncology Nurse     none none none

## 2023-10-18 ENCOUNTER — OFFICE VISIT (OUTPATIENT)
Dept: OBGYN | Facility: CLINIC | Age: 38
End: 2023-10-18
Payer: COMMERCIAL

## 2023-10-18 VITALS
BODY MASS INDEX: 26.21 KG/M2 | DIASTOLIC BLOOD PRESSURE: 85 MMHG | WEIGHT: 172.4 LBS | HEART RATE: 75 BPM | SYSTOLIC BLOOD PRESSURE: 125 MMHG | OXYGEN SATURATION: 99 %

## 2023-10-18 DIAGNOSIS — N93.9 ABNORMAL UTERINE BLEEDING: Primary | ICD-10-CM

## 2023-10-18 PROCEDURE — 99213 OFFICE O/P EST LOW 20 MIN: CPT | Performed by: OBSTETRICS & GYNECOLOGY

## 2023-10-18 NOTE — PROGRESS NOTES
Assessment & Plan     Abnormal uterine bleeding  Recommend day 3 labs  Recommend pelvic US.   - US Transvaginal Pelvic Non-OB; Future  - TSH with free T4 reflex; Future  - Anti-Mullerian hormone; Future  - Estradiol; Future  - Follicle stimulating hormone; Future  - Luteinizing Hormone; Future    Ordering of each unique test             No follow-ups on file.    Maria Dolores Kincaid MD  [facility] ORALIA Kim is a 37 year old, presenting for the following health issues:  Consult (Pregnancy consult)      HPI   Presents for fertility evaluation.   Periods have been getting worse.   Lasting 7-8 days. Had three periods in two months.   Heavy and clotty - Like when there were polyps previously    About to undergo attempts at conception with IUI (same sex partner).   Would like baseline labs.     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    HPV #16    Menorrhagia        Past Surgical History:   Procedure Laterality Date    DISSECT LYMPH NODE AXILLA      LAPAROSCOPIC TUBAL DYE STUDY Bilateral 6/1/2022    Procedure: Laparoscopic tubal dye study, Pap smear;  Surgeon: Maria Dolores Kincaid MD;  [facility]    OPERATIVE HYSTEROSCOPY WITH MORCELLATOR N/A 4/25/2018    Procedure: OPERATIVE HYSTEROSCOPY WITH MORCELLATOR;  Hysteroscopic Myomectomy ;  Surgeon: Maria Dolores Kincaid MD;  [facility]    OPERATIVE HYSTEROSCOPY WITH MORCELLATOR N/A 6/1/2022    Procedure: MORCELLATION, HYSTEROSCOPIC, Pap Smear;  Surgeon: Maria Dolores Kincaid MD;  [facility]       Family History   Problem Relation Age of Onset    Hypertension Father     Lipids Father        Social History     Socioeconomic History    Marital status: Single     Spouse name: Not on file    Number of children: Not on file    Years of education: Not on file    Highest education level: Not on file   Occupational History    Not on file   Tobacco Use    Smoking status: Never     Smokeless tobacco: Never   Substance and Sexual Activity    Alcohol use: Yes     Comment: x1 glass of wine every couple of weeks    Drug use: No    Sexual activity: Yes     Partners: Female   Other Topics Concern    Parent/sibling w/ CABG, MI or angioplasty before 65F 55M? Not Asked   Social History Narrative    Not on file     Social Determinants of Health     Financial Resource Strain: Not on file   Food Insecurity: Not on file   Transportation Needs: Not on file   Physical Activity: Not on file   Stress: Not on file   Social Connections: Not on file   Interpersonal Safety: Not on file   Housing Stability: Not on file       Current Outpatient Medications   Medication    Cyanocobalamin (B-12 SUPER STRENGTH) 5000 MCG/ML LIQD    multivitamin w/minerals (THERA-VIT-M) tablet    sertraline (ZOLOFT) 100 MG tablet    sertraline (ZOLOFT) 25 MG tablet    ibuprofen (ADVIL/MOTRIN) 800 MG tablet    Omega-3 Fatty Acids (FISH OIL OMEGA-3 PO)    Sertraline HCl (ZOLOFT PO)     No current facility-administered medications for this visit.          Allergies   Allergen Reactions    Codeine Itching     Significant full body itching, not breathing difficulties or hives.    Darvocet [Propoxyphene N-Apap] Itching     Significant full body itching, not breathing difficulties or hives.  Takes tylenol with no issues.    Dilaudid [Hydromorphone] Itching     Significant full body itching, not breathing difficulties or hives.    Morphine Itching     Significant full body itching, not breathing difficulties or hives.    Oxycontin [Oxycodone] Itching     Significant full body itching, not breathing difficulties or hives.    Percocet [Oxycodone-Acetaminophen] Itching     Significant full body itching, not breathing difficulties or hives. Takes Tylenol with no issues.    Sulfa Antibiotics Other (See Comments)     Other reaction(s): RASH/ITCHING    Vicodin [Hydrocodone-Acetaminophen] Itching     Significant full body itching, not breathing  difficulties or hives.  Takes tylenol with no issues           Review of Systems   Constitutional, HEENT, cardiovascular, pulmonary, gi and gu systems are negative, except as otherwise noted.      Objective    /85 (BP Location: Left arm, Patient Position: Sitting, Cuff Size: Adult Regular)   Pulse 75   Wt 78.2 kg (172 lb 6.4 oz)   SpO2 99%   BMI 26.21 kg/m    Body mass index is 26.21 kg/m .  Physical Exam   GENERAL: healthy, alert and no distress  MS: no gross musculoskeletal defects noted, no edema  PSYCH: mentation appears normal, affect normal/bright

## 2023-10-18 NOTE — Clinical Note
Hi, I'm wondering if this patient's MRSA precautions can be removed? Her infection was in 2008 and she had negative swabs last year. She was not on antibiotics recently when swabs were done.  Thank you

## 2023-10-30 ENCOUNTER — LAB (OUTPATIENT)
Dept: LAB | Facility: CLINIC | Age: 38
End: 2023-10-30
Payer: COMMERCIAL

## 2023-10-30 DIAGNOSIS — N93.9 ABNORMAL UTERINE BLEEDING: ICD-10-CM

## 2023-10-30 LAB
ESTRADIOL SERPL-MCNC: 41 PG/ML
FSH SERPL IRP2-ACNC: 6.5 MIU/ML
LH SERPL-ACNC: 7.6 MIU/ML
MIS SERPL-MCNC: 1.99 NG/ML (ref 0.15–7.5)
TSH SERPL DL<=0.005 MIU/L-ACNC: 1.31 UIU/ML (ref 0.3–4.2)

## 2023-10-30 PROCEDURE — 83001 ASSAY OF GONADOTROPIN (FSH): CPT

## 2023-10-30 PROCEDURE — 36415 COLL VENOUS BLD VENIPUNCTURE: CPT

## 2023-10-30 PROCEDURE — 83002 ASSAY OF GONADOTROPIN (LH): CPT

## 2023-10-30 PROCEDURE — 82670 ASSAY OF TOTAL ESTRADIOL: CPT

## 2023-10-30 PROCEDURE — 84443 ASSAY THYROID STIM HORMONE: CPT

## 2023-10-30 PROCEDURE — 83520 IMMUNOASSAY QUANT NOS NONAB: CPT

## 2023-11-03 ENCOUNTER — ANCILLARY PROCEDURE (OUTPATIENT)
Dept: ULTRASOUND IMAGING | Facility: CLINIC | Age: 38
End: 2023-11-03
Attending: OBSTETRICS & GYNECOLOGY
Payer: COMMERCIAL

## 2023-11-03 DIAGNOSIS — N93.9 ABNORMAL UTERINE BLEEDING: ICD-10-CM

## 2023-11-03 PROCEDURE — 76830 TRANSVAGINAL US NON-OB: CPT | Performed by: OBSTETRICS & GYNECOLOGY

## 2023-11-09 NOTE — TELEPHONE ENCOUNTER
Refill for sertraline denied.   Patient has not followed up since 5/2022 and has no upcoming appts

## 2023-12-18 ENCOUNTER — VIRTUAL VISIT (OUTPATIENT)
Dept: OBGYN | Facility: CLINIC | Age: 38
End: 2023-12-18
Payer: COMMERCIAL

## 2023-12-18 DIAGNOSIS — M25.552 HIP PAIN, LEFT: Primary | ICD-10-CM

## 2023-12-18 DIAGNOSIS — N84.0 ENDOMETRIAL POLYP: ICD-10-CM

## 2023-12-18 PROCEDURE — 99213 OFFICE O/P EST LOW 20 MIN: CPT | Mod: 93 | Performed by: OBSTETRICS & GYNECOLOGY

## 2023-12-18 NOTE — PROGRESS NOTES
Judy is a 38 year old who is being evaluated via a billable telephone visit.      What phone number would you like to be contacted at? Judy is a 38 year old who is being evaluated via a billable telephone visit.      What phone number would you like to be contacted at? [phone]    How would you like to obtain your AVS? MyChart    Distant Location (provider location):  On-site  Patient location: home    Assessment & Plan     Hip pain, left  Worse during menses  Questions of focal endometriosis on laparoscopy 6/2022 but very minimal and central over bladder.   Previous MRI suggested tendinitis  Recommend sport med referral then possibly PT  - Orthopedic  Referral; Future    Endometrial polyp  Recommend hysteroscopic resection then proceeding with IUI.   - Case Request: MORCELLATION, HYSTEROSCOPIC    Review of the result(s) of each unique test - MRI, US, labs  Ordering of each unique test  18 minutes spent by me on the date of the encounter doing chart review, history and exam, documentation and further activities per the note           No follow-ups on file.    Maria Dolores Kincaid MD  [facility]    Subjective   Judy is a 38 year old, presenting for the following health issues:  No chief complaint on file.      Presents for discussion of uterine polyp by telephone visit.    Planning to try to conceive Feb or March  H/o endometrial polyp x 2  New ultrasound showed a polyp again.    Also having left hip pain.  Has been having with periods for a long time, but getting worse and worse. Now not controlled by ibuprofen and tylenol.   Hasn't done physical therapy.     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    HPV #16    Menorrhagia        Past Surgical History:   Procedure Laterality Date    DISSECT LYMPH NODE AXILLA      LAPAROSCOPIC TUBAL DYE STUDY Bilateral 6/1/2022    Procedure: Laparoscopic tubal dye study, Pap smear;  Surgeon: Sanjiv  Maria Dolores Manley MD;  [facility]    OPERATIVE HYSTEROSCOPY WITH MORCELLATOR N/A 4/25/2018    Procedure: OPERATIVE HYSTEROSCOPY WITH MORCELLATOR;  Hysteroscopic Myomectomy ;  Surgeon: Maria Dolores Kincaid MD;  [facility]    OPERATIVE HYSTEROSCOPY WITH MORCELLATOR N/A 6/1/2022    Procedure: MORCELLATION, HYSTEROSCOPIC, Pap Smear;  Surgeon: Mari aDolores Kincaid MD;  [facility]       Family History   Problem Relation Age of Onset    Hypertension Father     Lipids Father        Social History     Socioeconomic History    Marital status: Single     Spouse name: Not on file    Number of children: Not on file    Years of education: Not on file    Highest education level: Not on file   Occupational History    Not on file   Tobacco Use    Smoking status: Never    Smokeless tobacco: Never   Substance and Sexual Activity    Alcohol use: Yes     Comment: x1 glass of wine every couple of weeks    Drug use: No    Sexual activity: Yes     Partners: Female   Other Topics Concern    Parent/sibling w/ CABG, MI or angioplasty before 65F 55M? Not Asked   Social History Narrative    Not on file     Social Determinants of Health     Financial Resource Strain: Not on file   Food Insecurity: Not on file   Transportation Needs: Not on file   Physical Activity: Not on file   Stress: Not on file   Social Connections: Not on file   Interpersonal Safety: Not on file   Housing Stability: Not on file       Current Outpatient Medications   Medication    Cyanocobalamin (B-12 SUPER STRENGTH) 5000 MCG/ML LIQD    multivitamin w/minerals (THERA-VIT-M) tablet    sertraline (ZOLOFT) 100 MG tablet    sertraline (ZOLOFT) 25 MG tablet    Sertraline HCl (ZOLOFT PO)     No current facility-administered medications for this visit.          Allergies   Allergen Reactions    Codeine Itching     Significant full body itching, not breathing difficulties or hives.    Darvocet [Propoxyphene N-Apap] Itching     Significant full body itching,  not breathing difficulties or hives.  Takes tylenol with no issues.    Dilaudid [Hydromorphone] Itching     Significant full body itching, not breathing difficulties or hives.    Morphine Itching     Significant full body itching, not breathing difficulties or hives.    Oxycontin [Oxycodone] Itching     Significant full body itching, not breathing difficulties or hives.    Percocet [Oxycodone-Acetaminophen] Itching     Significant full body itching, not breathing difficulties or hives. Takes Tylenol with no issues.    Sulfa Antibiotics Other (See Comments)     Other reaction(s): RASH/ITCHING    Vicodin [Hydrocodone-Acetaminophen] Itching     Significant full body itching, not breathing difficulties or hives.  Takes tylenol with no issues             Constitutional, HEENT, cardiovascular, pulmonary, gi and gu systems are negative, except as otherwise noted.      Objective           Vitals:  No vitals were obtained today due to virtual visit.      healthy, alert, and no distress  PSYCH: Alert and oriented times 3; coherent speech, normal   rate and volume, able to articulate logical thoughts, able   to abstract reason, no tangential thoughts, no hallucinations   or delusions  Her affect is normal  RESP: No cough, no audible wheezing, able to talk in full sentences  Remainder of exam unable to be completed due to telephone visits        Surgical Pathology Report                         Case: [id number]                                   Authorizing Provider:  Maria Dolores Kincaid,   Collected:           06/01/2022 12:55 PM                                  MD                                                                           Ordering Location:      MAIN OR                 Received:            06/01/2022 01:32 PM           Pathologist:           Brennan Cyr MD                                                              Specimen:    Endometrium, Endometrial polyp                                      Component      Latest Ref Rng 10/30/2023  9:44 AM   TSH      0.30 - 4.20 uIU/mL 1.31    Anti-Mullerian Hormone      0.150 - 7.500 ng/mL 1.990    Estradiol      pg/mL 41    FSH      mIU/mL 6.5    Luteinizing Hormone      mIU/mL 7.6      Gynecological Ultrasound Report  Pelvic U/S - Transvaginal   [facility]  Referring Provider: Dr. Maria Dolores Kincaid  Sonographer:  Keisha Fuentes RDMS  Indication: Bleeding/Menses- Dysfunctional uterine bleeding (DUB) and Abnormal uterine bleeding (AUB)  LMP: No LMP recorded.  History: polyps     Gynecological Ultrasonography:   Uterus: anteverted. Contour is smooth/regular.  Size: 8.1 x 3.5 x 4.5 cm  Endometrium: Thickness Total 7.6 mm  Findings: echogenic area with blood flow mid endometrial lining = 1.2x 0.7x 0.6cm     Right Ovary: 2.1 x 2.0 x 1.3 cm. Wnl  Left Ovary: 2.3 x 2.1 x 1.6 cm. Wnl  Cul de Sac Free Fluid: No free fluid  Technique: Transvaginal Imaging performed     Impression:      The uterus and ovaries were visualized and no abnormalities were seen.  There is a possible endometrial polyp as noted above     Federica Dent MD      MR LEFT HIP WITHOUT CONTRAST  12/13/2017 10:56 AM     HISTORY:  Left hip pain.     TECHNIQUE:  Coronal T1 and inversion recovery, sagittal T1, and  transverse proton density and fat suppressed T2 weighted images.     FINDINGS:   Osseous and Cartilaginous Structures:  No fracture or destructive bone  lesion.  No femoral head osteonecrosis.  No significant hip  osteoarthritis or apparent chondromalacia.        Acetabular Labrum: No juxtaacetabular cyst.  No obvious labral tear is  appreciated, allowing for the large FOV technique.  If indicated  clinically, MR arthrography would be considered the study of choice in  this regard.     Common Hamstring Tendon: Intact.     Gluteal Tendon Greater Trochanteric  Attachments: There is very mild  edema surrounding the insertion site of the left gluteus minimus  tendon on the anterior greater trochanter on axial series 701 image  29. This could be related to mild tendinosis or tendinitis. Otherwise  unremarkable.     Trochanteric and Iliopsoas Bursae: No fluid collection in the  trochanteric and iliopsoas bursae.     Joint space: No joint effusion.      There is a small amount of free fluid in the pelvis and a few small  right ovarian cysts incidentally noted.                                                                      IMPRESSION: Very mild edema surrounding the insertion site of the left  gluteus minimus tendon on the anterior greater trochanter could be  related to mild tendinosis or tendinitis. Otherwise unremarkable. No  evidence of fracture or AVN. No lytic or destructive lesions.     ASIA ACOSTA MD    Phone call duration: 15 minutes

## 2023-12-19 ENCOUNTER — TELEPHONE (OUTPATIENT)
Dept: OBGYN | Facility: CLINIC | Age: 38
End: 2023-12-19
Payer: COMMERCIAL

## 2023-12-26 NOTE — TELEPHONE ENCOUNTER
Type of surgery: gyn  Location of surgery: [facility]  Date and time of surgery: 01/31/2024 10:40AM  Surgeon: Dr. Maria Dolores Kincaid  Pre-Op Appt Date: surgeon same day  Post-Op Appt Date: n/a   Packet sent out: Yes  Pre-cert/Authorization completed:  Not Applicable  Date: 12/26/2023

## 2024-01-02 RX ORDER — ACETAMINOPHEN 325 MG/1
975 TABLET ORAL ONCE
Status: CANCELLED | OUTPATIENT
Start: 2024-01-31

## 2024-01-24 DIAGNOSIS — Z01.818 PRE-OP EXAM: Primary | ICD-10-CM

## 2024-01-24 DIAGNOSIS — N84.0 ENDOMETRIAL POLYP: ICD-10-CM

## 2024-01-28 LAB
ABO/RH(D): NORMAL
ANTIBODY SCREEN: NEGATIVE
SPECIMEN EXPIRATION DATE: NORMAL

## 2024-01-29 ENCOUNTER — LAB (OUTPATIENT)
Dept: LAB | Facility: CLINIC | Age: 39
End: 2024-01-29
Payer: COMMERCIAL

## 2024-01-29 DIAGNOSIS — N84.0 ENDOMETRIAL POLYP: ICD-10-CM

## 2024-01-29 DIAGNOSIS — Z01.818 PRE-OP EXAM: ICD-10-CM

## 2024-01-29 LAB
ERYTHROCYTE [DISTWIDTH] IN BLOOD BY AUTOMATED COUNT: 13.2 % (ref 10–15)
HCT VFR BLD AUTO: 35.7 % (ref 35–47)
HGB BLD-MCNC: 11.6 G/DL (ref 11.7–15.7)
MCH RBC QN AUTO: 29.9 PG (ref 26.5–33)
MCHC RBC AUTO-ENTMCNC: 32.5 G/DL (ref 31.5–36.5)
MCV RBC AUTO: 92 FL (ref 78–100)
PLATELET # BLD AUTO: 272 10E3/UL (ref 150–450)
RBC # BLD AUTO: 3.88 10E6/UL (ref 3.8–5.2)
WBC # BLD AUTO: 6 10E3/UL (ref 4–11)

## 2024-01-29 PROCEDURE — 36415 COLL VENOUS BLD VENIPUNCTURE: CPT

## 2024-01-29 PROCEDURE — 86900 BLOOD TYPING SEROLOGIC ABO: CPT

## 2024-01-29 PROCEDURE — 85027 COMPLETE CBC AUTOMATED: CPT

## 2024-01-29 PROCEDURE — 86850 RBC ANTIBODY SCREEN: CPT

## 2024-01-29 PROCEDURE — 87635 SARS-COV-2 COVID-19 AMP PRB: CPT

## 2024-01-29 PROCEDURE — 86901 BLOOD TYPING SEROLOGIC RH(D): CPT

## 2024-01-30 LAB — SARS-COV-2 RNA RESP QL NAA+PROBE: NEGATIVE

## 2024-01-31 ENCOUNTER — ANESTHESIA (OUTPATIENT)
Dept: SURGERY | Facility: CLINIC | Age: 39
End: 2024-01-31
Payer: COMMERCIAL

## 2024-01-31 ENCOUNTER — ANESTHESIA EVENT (OUTPATIENT)
Dept: SURGERY | Facility: CLINIC | Age: 39
End: 2024-01-31
Payer: COMMERCIAL

## 2024-01-31 ENCOUNTER — HOSPITAL ENCOUNTER (OUTPATIENT)
Facility: CLINIC | Age: 39
Discharge: HOME | End: 2024-01-31
Attending: OBSTETRICS & GYNECOLOGY | Admitting: OBSTETRICS & GYNECOLOGY
Payer: COMMERCIAL

## 2024-01-31 VITALS
SYSTOLIC BLOOD PRESSURE: 127 MMHG | WEIGHT: 167.99 LBS | HEART RATE: 98 BPM | TEMPERATURE: 98.1 F | RESPIRATION RATE: 18 BRPM | DIASTOLIC BLOOD PRESSURE: 82 MMHG | OXYGEN SATURATION: 100 % | HEIGHT: 68 IN | BODY MASS INDEX: 25.46 KG/M2

## 2024-01-31 DIAGNOSIS — Z98.890 STATUS POST HYSTEROSCOPIC POLYPECTOMY: Primary | ICD-10-CM

## 2024-01-31 PROBLEM — K90.9 MALABSORPTION OF IRON: Status: ACTIVE | Noted: 2017-12-19

## 2024-01-31 PROBLEM — N92.4 EXCESSIVE BLEEDING IN PREMENOPAUSAL PERIOD: Status: ACTIVE | Noted: 2018-02-23

## 2024-01-31 PROBLEM — D50.0 IRON DEFICIENCY ANEMIA DUE TO CHRONIC BLOOD LOSS: Status: ACTIVE | Noted: 2017-12-19

## 2024-01-31 PROBLEM — N84.0 ENDOMETRIAL POLYP: Status: ACTIVE | Noted: 2022-05-04

## 2024-01-31 LAB — HCG UR QL: NEGATIVE

## 2024-01-31 PROCEDURE — 360N000076 HC SURGERY LEVEL 3, PER MIN: Performed by: OBSTETRICS & GYNECOLOGY

## 2024-01-31 PROCEDURE — 88305 TISSUE EXAM BY PATHOLOGIST: CPT | Mod: 26 | Performed by: PATHOLOGY

## 2024-01-31 PROCEDURE — 250N000011 HC RX IP 250 OP 636: Performed by: NURSE ANESTHETIST, CERTIFIED REGISTERED

## 2024-01-31 PROCEDURE — 370N000017 HC ANESTHESIA TECHNICAL FEE, PER MIN: Performed by: OBSTETRICS & GYNECOLOGY

## 2024-01-31 PROCEDURE — 250N000009 HC RX 250: Performed by: NURSE ANESTHETIST, CERTIFIED REGISTERED

## 2024-01-31 PROCEDURE — 81025 URINE PREGNANCY TEST: CPT | Performed by: OBSTETRICS & GYNECOLOGY

## 2024-01-31 PROCEDURE — 58558 HYSTEROSCOPY BIOPSY: CPT | Performed by: OBSTETRICS & GYNECOLOGY

## 2024-01-31 PROCEDURE — 999N000141 HC STATISTIC PRE-PROCEDURE NURSING ASSESSMENT: Performed by: OBSTETRICS & GYNECOLOGY

## 2024-01-31 PROCEDURE — 250N000013 HC RX MED GY IP 250 OP 250 PS 637: Performed by: OBSTETRICS & GYNECOLOGY

## 2024-01-31 PROCEDURE — 272N000001 HC OR GENERAL SUPPLY STERILE: Performed by: OBSTETRICS & GYNECOLOGY

## 2024-01-31 PROCEDURE — 88305 TISSUE EXAM BY PATHOLOGIST: CPT | Mod: TC | Performed by: OBSTETRICS & GYNECOLOGY

## 2024-01-31 PROCEDURE — 258N000003 HC RX IP 258 OP 636: Performed by: NURSE ANESTHETIST, CERTIFIED REGISTERED

## 2024-01-31 PROCEDURE — 710N000012 HC RECOVERY PHASE 2, PER MINUTE: Performed by: OBSTETRICS & GYNECOLOGY

## 2024-01-31 PROCEDURE — 250N000009 HC RX 250: Performed by: OBSTETRICS & GYNECOLOGY

## 2024-01-31 RX ORDER — SODIUM CHLORIDE, SODIUM LACTATE, POTASSIUM CHLORIDE, CALCIUM CHLORIDE 600; 310; 30; 20 MG/100ML; MG/100ML; MG/100ML; MG/100ML
INJECTION, SOLUTION INTRAVENOUS CONTINUOUS
Status: DISCONTINUED | OUTPATIENT
Start: 2024-01-31 | End: 2024-01-31 | Stop reason: HOSPADM

## 2024-01-31 RX ORDER — KETOROLAC TROMETHAMINE 30 MG/ML
INJECTION, SOLUTION INTRAMUSCULAR; INTRAVENOUS PRN
Status: DISCONTINUED | OUTPATIENT
Start: 2024-01-31 | End: 2024-01-31

## 2024-01-31 RX ORDER — ACETAMINOPHEN 325 MG/1
650 TABLET ORAL EVERY 6 HOURS PRN
COMMUNITY
Start: 2024-01-31

## 2024-01-31 RX ORDER — FENTANYL CITRATE 50 UG/ML
50 INJECTION, SOLUTION INTRAMUSCULAR; INTRAVENOUS EVERY 5 MIN PRN
Status: DISCONTINUED | OUTPATIENT
Start: 2024-01-31 | End: 2024-01-31 | Stop reason: HOSPADM

## 2024-01-31 RX ORDER — LIDOCAINE HYDROCHLORIDE 20 MG/ML
INJECTION, SOLUTION INFILTRATION; PERINEURAL PRN
Status: DISCONTINUED | OUTPATIENT
Start: 2024-01-31 | End: 2024-01-31

## 2024-01-31 RX ORDER — ACETAMINOPHEN 325 MG/1
975 TABLET ORAL ONCE
Status: COMPLETED | OUTPATIENT
Start: 2024-01-31 | End: 2024-01-31

## 2024-01-31 RX ORDER — ONDANSETRON 2 MG/ML
INJECTION INTRAMUSCULAR; INTRAVENOUS PRN
Status: DISCONTINUED | OUTPATIENT
Start: 2024-01-31 | End: 2024-01-31

## 2024-01-31 RX ORDER — PROPOFOL 10 MG/ML
INJECTION, EMULSION INTRAVENOUS CONTINUOUS PRN
Status: DISCONTINUED | OUTPATIENT
Start: 2024-01-31 | End: 2024-01-31

## 2024-01-31 RX ORDER — ONDANSETRON 2 MG/ML
4 INJECTION INTRAMUSCULAR; INTRAVENOUS EVERY 30 MIN PRN
Status: DISCONTINUED | OUTPATIENT
Start: 2024-01-31 | End: 2024-01-31 | Stop reason: HOSPADM

## 2024-01-31 RX ORDER — FENTANYL CITRATE 50 UG/ML
25 INJECTION, SOLUTION INTRAMUSCULAR; INTRAVENOUS EVERY 5 MIN PRN
Status: DISCONTINUED | OUTPATIENT
Start: 2024-01-31 | End: 2024-01-31 | Stop reason: HOSPADM

## 2024-01-31 RX ORDER — ONDANSETRON 4 MG/1
4 TABLET, ORALLY DISINTEGRATING ORAL EVERY 30 MIN PRN
Status: DISCONTINUED | OUTPATIENT
Start: 2024-01-31 | End: 2024-01-31 | Stop reason: HOSPADM

## 2024-01-31 RX ORDER — PROPOFOL 10 MG/ML
INJECTION, EMULSION INTRAVENOUS PRN
Status: DISCONTINUED | OUTPATIENT
Start: 2024-01-31 | End: 2024-01-31

## 2024-01-31 RX ORDER — FENTANYL CITRATE 50 UG/ML
INJECTION, SOLUTION INTRAMUSCULAR; INTRAVENOUS PRN
Status: DISCONTINUED | OUTPATIENT
Start: 2024-01-31 | End: 2024-01-31

## 2024-01-31 RX ORDER — SODIUM CHLORIDE, SODIUM LACTATE, POTASSIUM CHLORIDE, CALCIUM CHLORIDE 600; 310; 30; 20 MG/100ML; MG/100ML; MG/100ML; MG/100ML
INJECTION, SOLUTION INTRAVENOUS CONTINUOUS PRN
Status: DISCONTINUED | OUTPATIENT
Start: 2024-01-31 | End: 2024-01-31

## 2024-01-31 RX ORDER — DEXAMETHASONE SODIUM PHOSPHATE 4 MG/ML
INJECTION, SOLUTION INTRA-ARTICULAR; INTRALESIONAL; INTRAMUSCULAR; INTRAVENOUS; SOFT TISSUE PRN
Status: DISCONTINUED | OUTPATIENT
Start: 2024-01-31 | End: 2024-01-31

## 2024-01-31 RX ORDER — IBUPROFEN 200 MG
600 TABLET ORAL EVERY 6 HOURS PRN
COMMUNITY
Start: 2024-01-31

## 2024-01-31 RX ADMIN — KETOROLAC TROMETHAMINE 30 MG: 30 INJECTION, SOLUTION INTRAMUSCULAR at 11:15

## 2024-01-31 RX ADMIN — MIDAZOLAM 2 MG: 1 INJECTION INTRAMUSCULAR; INTRAVENOUS at 10:40

## 2024-01-31 RX ADMIN — PROPOFOL 50 MG: 10 INJECTION, EMULSION INTRAVENOUS at 10:44

## 2024-01-31 RX ADMIN — PROPOFOL 150 MCG/KG/MIN: 10 INJECTION, EMULSION INTRAVENOUS at 10:44

## 2024-01-31 RX ADMIN — ONDANSETRON 4 MG: 2 INJECTION INTRAMUSCULAR; INTRAVENOUS at 10:55

## 2024-01-31 RX ADMIN — FENTANYL CITRATE 25 MCG: 50 INJECTION INTRAMUSCULAR; INTRAVENOUS at 10:52

## 2024-01-31 RX ADMIN — ACETAMINOPHEN 975 MG: 325 TABLET, FILM COATED ORAL at 11:42

## 2024-01-31 RX ADMIN — FENTANYL CITRATE 25 MCG: 50 INJECTION INTRAMUSCULAR; INTRAVENOUS at 10:49

## 2024-01-31 RX ADMIN — SODIUM CHLORIDE, POTASSIUM CHLORIDE, SODIUM LACTATE AND CALCIUM CHLORIDE: 600; 310; 30; 20 INJECTION, SOLUTION INTRAVENOUS at 10:40

## 2024-01-31 RX ADMIN — DEXAMETHASONE SODIUM PHOSPHATE 6 MG: 4 INJECTION, SOLUTION INTRA-ARTICULAR; INTRALESIONAL; INTRAMUSCULAR; INTRAVENOUS; SOFT TISSUE at 10:55

## 2024-01-31 RX ADMIN — LIDOCAINE HYDROCHLORIDE 40 MG: 20 INJECTION, SOLUTION INFILTRATION; PERINEURAL at 10:43

## 2024-01-31 ASSESSMENT — ACTIVITIES OF DAILY LIVING (ADL)
ADLS_ACUITY_SCORE: 35
ADLS_ACUITY_SCORE: 35
ADLS_ACUITY_SCORE: 33

## 2024-01-31 NOTE — OP NOTE
[facility] Operative Note    Pre-operative diagnosis: Endometrial polyp [N84.0]   Post-operative diagnosis Same   Procedure: Procedure(s):  MORCELLATION, HYSTEROSCOPIC   Surgeon: Maria Dolores Kincaid MD   Assistants(s): none   Estimated blood loss: 20 ml    Specimens: Endometrial polyp   Findings: Left lateral/posterior endometrial polyp. Smooth cavity at completion of case.        Procedure:  PARQ was held and consent signed. The patient was taken to the operating room, where brief was held.  MAC was initiated and patient was positioned in dorsal lithotomy in what was felt to be a neurologically safe position. An exam under anesthesia was performed. She was prepped and draped in sterile fashion.  A team pause was held.  An open armed speculum was placed. A single-toothed tenaculum was placed on the cervix. A paracervical block was performed with 1% lidocaine without epinephrine.  The cervix was easily serially dilated to 25 Danish, which was necessary to admit the hysteroscope. The operative hysteroscope was primed and inserted with hydrodilation of the cervix. The above findings were noted. The Myosure was then inserted through the operating channel of the hysteroscope. The polyps were removed under direct visualization. The uterine cavity was smooth and hemostatic. The hysteroscope and speculum were removed.    The patient tolerated the procedure well. Closing counts were correct.  No antibiotics were indicated.    Maria Dolores Kincaid MD

## 2024-01-31 NOTE — ANESTHESIA PREPROCEDURE EVALUATION
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    HPV #16    Menorrhagia       Past Surgical History:   Procedure Laterality Date    DISSECT LYMPH NODE AXILLA      LAPAROSCOPIC TUBAL DYE STUDY Bilateral 2022    Procedure: Laparoscopic tubal dye study, Pap smear;  Surgeon: Maria Dolores Kincaid MD;  Location: [facility]    OPERATIVE HYSTEROSCOPY WITH MORCELLATOR N/A 2018    Procedure: OPERATIVE HYSTEROSCOPY WITH MORCELLATOR;  Hysteroscopic Myomectomy ;  Surgeon: Maria Dolores Kincaid MD;  Location: [facility]    OPERATIVE HYSTEROSCOPY WITH MORCELLATOR N/A 2022    Procedure: MORCELLATION, HYSTEROSCOPIC, Pap Smear;  Surgeon: Maria Dolores Kincaid MD;  Location: [facility]      Allergies   Allergen Reactions    Codeine Itching     Significant full body itching, not breathing difficulties or hives.    Darvocet [Propoxyphene N-Apap] Itching     Significant full body itching, not breathing difficulties or hives.  Takes tylenol with no issues.    Dilaudid [Hydromorphone] Itching     Significant full body itching, not breathing difficulties or hives.    Morphine Itching     Significant full body itching, not breathing difficulties or hives.    Oxycontin [Oxycodone] Itching     Significant full body itching, not breathing difficulties or hives.    Percocet [Oxycodone-Acetaminophen] Itching     Significant full body itching, not breathing difficulties or hives. Takes Tylenol with no issues.    Sulfa Antibiotics Other (See Comments)     Other reaction(s): RASH/ITCHING    Vicodin [Hydrocodone-Acetaminophen] Itching     Significant full body itching, not breathing difficulties or hives.  Takes tylenol with no issues      Social History     Tobacco Use    Smoking status: Never    Smokeless tobacco: Never   Substance Use Topics     "Alcohol use: Yes     Comment: x1 glass of wine every couple of weeks      Wt Readings from Last 1 Encounters:   10/18/23 78.2 kg (172 lb 6.4 oz)        Anesthesia Evaluation   Pt has had prior anesthetic. Type: MAC.        ROS/MED HX  ENT/Pulmonary:  - neg pulmonary ROS   (+)                      asthma                  Neurologic:  - neg neurologic ROS     Cardiovascular:  - neg cardiovascular ROS     METS/Exercise Tolerance:     Hematologic:  - neg hematologic  ROS     Musculoskeletal:  - neg musculoskeletal ROS     GI/Hepatic:  - neg GI/hepatic ROS     Renal/Genitourinary:  - neg Renal ROS     Endo:  - neg endo ROS     Psychiatric/Substance Use:  - neg psychiatric ROS     Infectious Disease:  - neg infectious disease ROS     Malignancy:  - neg malignancy ROS     Other:  - neg other ROS          Physical Exam    Airway        Mallampati: II   TM distance: > 3 FB   Neck ROM: full   Mouth opening: > 3 cm    Respiratory Devices and Support         Dental       (+) Completely normal teeth      Cardiovascular   cardiovascular exam normal          Pulmonary   pulmonary exam normal                OUTSIDE LABS:  CBC:   Lab Results   Component Value Date    WBC 6.0 01/29/2024    WBC 5.2 05/31/2022    HGB 11.6 (L) 01/29/2024    HGB 13.6 05/31/2022    HCT 35.7 01/29/2024    HCT 40.8 05/31/2022     01/29/2024     05/31/2022     BMP:   Lab Results   Component Value Date     12/29/2019     08/07/2014    POTASSIUM 3.4 12/29/2019    POTASSIUM 3.9 08/07/2014    CHLORIDE 109 12/29/2019    CHLORIDE 104 08/07/2014    CO2 22 12/29/2019    CO2 29 08/07/2014    BUN 11 12/29/2019    BUN 9 08/07/2014    CR 0.62 12/29/2019    CR 0.74 08/07/2014    GLC 93 06/01/2022     (H) 12/29/2019     COAGS: No results found for: \"PTT\", \"INR\", \"FIBR\"  POC:   Lab Results   Component Value Date    HCG Negative 06/01/2022     HEPATIC:   Lab Results   Component Value Date    ALBUMIN 3.7 12/29/2019    PROTTOTAL 6.9 12/29/2019 "    ALT 18 12/29/2019    AST 16 12/29/2019    ALKPHOS 52 12/29/2019    BILITOTAL 0.6 12/29/2019     OTHER:   Lab Results   Component Value Date    ABHISHEK 8.2 (L) 12/29/2019    LIPASE 74 12/29/2019    TSH 1.31 10/30/2023    T4 1.03 07/01/2016    SED 7 08/07/2014       Anesthesia Plan    ASA Status:  2    NPO Status:  NPO Appropriate    Anesthesia Type: MAC.     - Reason for MAC: immobility needed   Induction: Intravenous.   Maintenance: TIVA.        Consents    Anesthesia Plan(s) and associated risks, benefits, and realistic alternatives discussed. Questions answered and patient/representative(s) expressed understanding.     - Discussed: Risks, Benefits and Alternatives for BOTH SEDATION and the PROCEDURE were discussed     - Discussed with:  Patient      - Extended Intubation/Ventilatory Support Discussed: No.      - Patient is DNR/DNI Status: No     Use of blood products discussed: No .     Postoperative Care    Pain management: IV analgesics.   PONV prophylaxis: Ondansetron (or other 5HT-3), Promethazine or metoclopramide, Dexamethasone or Solumedrol     Comments:               Humphrey Schmidt MD    I have reviewed the pertinent notes and labs in the chart from the past 30 days and (re)examined the patient.  Any updates or changes from those notes are reflected in this note.

## 2024-01-31 NOTE — ANESTHESIA POSTPROCEDURE EVALUATION
Patient: Judy Argueta    Procedure: Procedure(s):  MORCELLATION, HYSTEROSCOPIC       Anesthesia Type:  MAC    Note:  Disposition: Outpatient   Postop Pain Control: Uneventful            Sign Out: Well controlled pain   PONV: No   Neuro/Psych: Uneventful            Sign Out: Acceptable/Baseline neuro status   Airway/Respiratory: Uneventful            Sign Out: Acceptable/Baseline resp. status   CV/Hemodynamics: Uneventful            Sign Out: Acceptable CV status; No obvious hypovolemia; No obvious fluid overload   Other NRE: NONE   DID A NON-ROUTINE EVENT OCCUR? No           Last vitals:  Vitals Value Taken Time   /77 01/31/24 1200   Temp 36.5  C (97.7  F) 01/31/24 1127   Pulse 64 01/31/24 1200   Resp 16 01/31/24 1127   SpO2 100 % 01/31/24 1207   Vitals shown include unfiled device data.    Electronically Signed By: Humphrey Schmidt MD  January 31, 2024  12:08 PM

## 2024-01-31 NOTE — ANESTHESIA CARE TRANSFER NOTE
Patient: Judy Argueta    Procedure: Procedure(s):  MORCELLATION, HYSTEROSCOPIC       Diagnosis: Endometrial polyp [N84.0]  Diagnosis Additional Information: No value filed.    Anesthesia Type:   MAC     Note:    Oropharynx: spontaneously breathing  Level of Consciousness: drowsy  Oxygen Supplementation: room air    Independent Airway: airway patency satisfactory and stable  Dentition: dentition unchanged  Vital Signs Stable: post-procedure vital signs reviewed and stable  Report to RN Given: handoff report given  Patient transferred to: Phase II    Handoff Report: Identifed the Patient, Identified the Reponsible Provider, Reviewed the pertinent medical history, Discussed the surgical course, Reviewed Intra-OP anesthesia mangement and issues during anesthesia, Set expectations for post-procedure period and Allowed opportunity for questions and acknowledgement of understanding    Vitals:  Vitals Value Taken Time   BP     Temp     Pulse     Resp     SpO2 100 % 01/31/24 1130   Vitals shown include unfiled device data.    Electronically Signed By: LLOYD Hurley CRNA  January 31, 2024  11:31 AM

## 2024-01-31 NOTE — DISCHARGE INSTRUCTIONS

## 2024-02-04 LAB
PATH REPORT.COMMENTS IMP SPEC: NORMAL
PATH REPORT.COMMENTS IMP SPEC: NORMAL
PATH REPORT.FINAL DX SPEC: NORMAL
PATH REPORT.GROSS SPEC: NORMAL
PATH REPORT.MICROSCOPIC SPEC OTHER STN: NORMAL
PATH REPORT.RELEVANT HX SPEC: NORMAL
PHOTO IMAGE: NORMAL

## 2024-07-07 ENCOUNTER — HEALTH MAINTENANCE LETTER (OUTPATIENT)
Age: 39
End: 2024-07-07

## 2025-03-09 ENCOUNTER — HEALTH MAINTENANCE LETTER (OUTPATIENT)
Age: 40
End: 2025-03-09

## 2025-03-18 DIAGNOSIS — D50.0 IRON DEFICIENCY ANEMIA DUE TO CHRONIC BLOOD LOSS: Primary | ICD-10-CM

## 2025-04-17 ENCOUNTER — LAB (OUTPATIENT)
Dept: INFUSION THERAPY | Facility: CLINIC | Age: 40
End: 2025-04-17
Attending: INTERNAL MEDICINE
Payer: COMMERCIAL

## 2025-04-17 DIAGNOSIS — D50.0 IRON DEFICIENCY ANEMIA DUE TO CHRONIC BLOOD LOSS: ICD-10-CM

## 2025-04-17 LAB
BASOPHILS # BLD AUTO: 0.1 10E3/UL (ref 0–0.2)
BASOPHILS NFR BLD AUTO: 1 %
EOSINOPHIL # BLD AUTO: 0.2 10E3/UL (ref 0–0.7)
EOSINOPHIL NFR BLD AUTO: 3 %
ERYTHROCYTE [DISTWIDTH] IN BLOOD BY AUTOMATED COUNT: 14.6 % (ref 10–15)
FERRITIN SERPL-MCNC: 8 NG/ML (ref 6–175)
HCT VFR BLD AUTO: 36.4 % (ref 35–47)
HGB BLD-MCNC: 11.7 G/DL (ref 11.7–15.7)
IMM GRANULOCYTES # BLD: 0 10E3/UL
IMM GRANULOCYTES NFR BLD: 1 %
IRON BINDING CAPACITY (ROCHE): 322 UG/DL (ref 240–430)
IRON SATN MFR SERPL: 16 % (ref 15–46)
IRON SERPL-MCNC: 53 UG/DL (ref 37–145)
LYMPHOCYTES # BLD AUTO: 1.7 10E3/UL (ref 0.8–5.3)
LYMPHOCYTES NFR BLD AUTO: 30 %
MCH RBC QN AUTO: 27.3 PG (ref 26.5–33)
MCHC RBC AUTO-ENTMCNC: 32.1 G/DL (ref 31.5–36.5)
MCV RBC AUTO: 85 FL (ref 78–100)
MONOCYTES # BLD AUTO: 0.4 10E3/UL (ref 0–1.3)
MONOCYTES NFR BLD AUTO: 6 %
NEUTROPHILS # BLD AUTO: 3.5 10E3/UL (ref 1.6–8.3)
NEUTROPHILS NFR BLD AUTO: 60 %
NRBC # BLD AUTO: 0 10E3/UL
NRBC BLD AUTO-RTO: 0 /100
PLATELET # BLD AUTO: 316 10E3/UL (ref 150–450)
RBC # BLD AUTO: 4.29 10E6/UL (ref 3.8–5.2)
WBC # BLD AUTO: 5.8 10E3/UL (ref 4–11)

## 2025-04-17 PROCEDURE — 85025 COMPLETE CBC W/AUTO DIFF WBC: CPT | Performed by: INTERNAL MEDICINE

## 2025-04-17 PROCEDURE — 83550 IRON BINDING TEST: CPT | Performed by: INTERNAL MEDICINE

## 2025-04-17 PROCEDURE — 36415 COLL VENOUS BLD VENIPUNCTURE: CPT

## 2025-04-17 PROCEDURE — 82728 ASSAY OF FERRITIN: CPT | Performed by: INTERNAL MEDICINE

## 2025-04-17 PROCEDURE — 83540 ASSAY OF IRON: CPT | Performed by: INTERNAL MEDICINE

## 2025-04-17 NOTE — PROGRESS NOTES
Nursing Note:  Judy Argueta presents today for labs.    Patient seen by provider today: No   present during visit today: Not Applicable.    Note: N/A.    Intravenous Access:  Lab draw site right ac, Needle type butterfly, Gauge 23.  Labs drawn without difficulty.    Discharge Plan:   Patient was sent home for future appointment.    Genna Durán RN

## (undated) DEVICE — DEVICE TISSUE REMOVAL HYSTEROSCOPIC MYOSURE LITE 30-401LITE

## (undated) DEVICE — ENDO TROCAR SLEEVE KII ADV FIXATION 05X100MM CFS02

## (undated) DEVICE — SU VICRYL 4-0 PS-2 18" UND J496H

## (undated) DEVICE — SOL NACL 0.9% IRRIG 3000ML BAG 2B7477

## (undated) DEVICE — PAD CHUX UNDERPAD 30X36" P3036C

## (undated) DEVICE — LINEN GOWN X4 5410

## (undated) DEVICE — KIT PROCEDURE FLUENT IN/OUT FLOWPAK TISS TRAP FLT-112S

## (undated) DEVICE — SEAL SET MYOSURE ROD LENS SCOPE SINGLE USE 40-902

## (undated) DEVICE — Device

## (undated) DEVICE — SUCTION CANISTER BEMIS HI FLOW 006772-901

## (undated) DEVICE — PREP DYNA-HEX 4% CHG SCRUB 4OZ BOTTLE MDS098710

## (undated) DEVICE — SOL NACL 0.9% IRRIG 1000ML BOTTLE 2F7124

## (undated) DEVICE — GLOVE BIOGEL PI MICRO INDICATOR UNDERGLOVE SZ 7.0 48970

## (undated) DEVICE — GLOVE PROTEXIS BLUE W/NEU-THERA 7.0  2D73EB70

## (undated) DEVICE — SYR 20ML LL W/O NDL 302830

## (undated) DEVICE — GLOVE BIOGEL PI ULTRATOUCH G SZ 6.5 42165

## (undated) DEVICE — TUBING SMOKE EVAC PNEUVIEW 9660-XE

## (undated) DEVICE — ESU HOLDER LAP INST DISP PURPLE LONG 330MM H-PRO-330

## (undated) DEVICE — LINEN TOWEL PACK X5 5464

## (undated) DEVICE — SPECIMEN BAG BEMIS HI FLOW SUCTION WHITE SOCK 533810

## (undated) DEVICE — ESU HOLDER LAP INST DISP YELLOW SHORT 250MM H-PRO-250

## (undated) DEVICE — PAD PERI INDIV WRAP 11" 2022A

## (undated) DEVICE — STRAP KNEE/BODY 31143004

## (undated) DEVICE — JELLY LUBRICATING SURGILUBE 2OZ TUBE 281020502

## (undated) DEVICE — GLOVE PROTEXIS W/NEU-THERA 6.5  2D73TE65

## (undated) DEVICE — TUBING SYS AQUILEX BLUE INFLOW AQL-110 YLW OUTFLOW AQL-111

## (undated) DEVICE — CATH TRAY FOLEY SURESTEP 16FR WDRAIN BAG STLK LATEX A300316A

## (undated) DEVICE — ENDO TROCAR FIRST ENTRY KII FIOS Z-THRD 11X100MM CTF33

## (undated) DEVICE — ENDO TROCAR FIRST ENTRY KII FIOS ADV FIX 05X100MM CFF03

## (undated) DEVICE — ENDO TROCAR FIRST ENTRY KII FIOS ADV FIX 11X100MM CFF33

## (undated) DEVICE — PREP SCRUB SOL EXIDINE 4% CHG 4OZ 29002-404

## (undated) DEVICE — PANTIES MESH LG/XLG 2PK 706M2

## (undated) DEVICE — SYR 50ML LL W/O NDL 309653

## (undated) DEVICE — SUCTION MANIFOLD NEPTUNE 2 SYS 4 PORT 0702-020-000

## (undated) DEVICE — SOL WATER IRRIG 1000ML BOTTLE 2F7114

## (undated) DEVICE — ESU GROUND PAD UNIVERSAL W/O CORD

## (undated) DEVICE — TUBING C02 INSUFFLATION LAP FILTER HEATER 6198

## (undated) DEVICE — PAD CHUX UNDERPAD 30X30"

## (undated) RX ORDER — PROPOFOL 10 MG/ML
INJECTION, EMULSION INTRAVENOUS
Status: DISPENSED
Start: 2018-04-25

## (undated) RX ORDER — ACETAMINOPHEN 325 MG/1
TABLET ORAL
Status: DISPENSED
Start: 2024-01-31

## (undated) RX ORDER — LIDOCAINE HYDROCHLORIDE 10 MG/ML
INJECTION, SOLUTION EPIDURAL; INFILTRATION; INTRACAUDAL; PERINEURAL
Status: DISPENSED
Start: 2018-04-25

## (undated) RX ORDER — FENTANYL CITRATE 50 UG/ML
INJECTION, SOLUTION INTRAMUSCULAR; INTRAVENOUS
Status: DISPENSED
Start: 2024-01-31

## (undated) RX ORDER — LIDOCAINE HYDROCHLORIDE 20 MG/ML
INJECTION, SOLUTION EPIDURAL; INFILTRATION; INTRACAUDAL; PERINEURAL
Status: DISPENSED
Start: 2022-06-01

## (undated) RX ORDER — DEXAMETHASONE SODIUM PHOSPHATE 4 MG/ML
INJECTION, SOLUTION INTRA-ARTICULAR; INTRALESIONAL; INTRAMUSCULAR; INTRAVENOUS; SOFT TISSUE
Status: DISPENSED
Start: 2022-06-01

## (undated) RX ORDER — EPHEDRINE SULFATE 50 MG/ML
INJECTION, SOLUTION INTRAMUSCULAR; INTRAVENOUS; SUBCUTANEOUS
Status: DISPENSED
Start: 2022-06-01

## (undated) RX ORDER — KETOROLAC TROMETHAMINE 30 MG/ML
INJECTION, SOLUTION INTRAMUSCULAR; INTRAVENOUS
Status: DISPENSED
Start: 2022-06-01

## (undated) RX ORDER — CEFAZOLIN SODIUM/WATER 2 G/20 ML
SYRINGE (ML) INTRAVENOUS
Status: DISPENSED
Start: 2022-06-01

## (undated) RX ORDER — FENTANYL CITRATE 50 UG/ML
INJECTION, SOLUTION INTRAMUSCULAR; INTRAVENOUS
Status: DISPENSED
Start: 2018-04-25

## (undated) RX ORDER — LIDOCAINE HYDROCHLORIDE 10 MG/ML
INJECTION, SOLUTION EPIDURAL; INFILTRATION; INTRACAUDAL; PERINEURAL
Status: DISPENSED
Start: 2024-01-31

## (undated) RX ORDER — PROPOFOL 10 MG/ML
INJECTION, EMULSION INTRAVENOUS
Status: DISPENSED
Start: 2022-06-01

## (undated) RX ORDER — ONDANSETRON 2 MG/ML
INJECTION INTRAMUSCULAR; INTRAVENOUS
Status: DISPENSED
Start: 2018-04-25

## (undated) RX ORDER — KETOROLAC TROMETHAMINE 30 MG/ML
INJECTION, SOLUTION INTRAMUSCULAR; INTRAVENOUS
Status: DISPENSED
Start: 2018-04-25

## (undated) RX ORDER — ONDANSETRON 2 MG/ML
INJECTION INTRAMUSCULAR; INTRAVENOUS
Status: DISPENSED
Start: 2022-06-01

## (undated) RX ORDER — DIPHENHYDRAMINE HYDROCHLORIDE 50 MG/ML
INJECTION INTRAMUSCULAR; INTRAVENOUS
Status: DISPENSED
Start: 2022-06-01

## (undated) RX ORDER — FENTANYL CITRATE 50 UG/ML
INJECTION, SOLUTION INTRAMUSCULAR; INTRAVENOUS
Status: DISPENSED
Start: 2022-06-01

## (undated) RX ORDER — DIPHENHYDRAMINE HYDROCHLORIDE 50 MG/ML
INJECTION INTRAMUSCULAR; INTRAVENOUS
Status: DISPENSED
Start: 2018-04-25

## (undated) RX ORDER — ACETAMINOPHEN 325 MG/1
TABLET ORAL
Status: DISPENSED
Start: 2022-06-01

## (undated) RX ORDER — FENTANYL CITRATE-0.9 % NACL/PF 10 MCG/ML
PLASTIC BAG, INJECTION (ML) INTRAVENOUS
Status: DISPENSED
Start: 2022-06-01